# Patient Record
Sex: MALE | Race: WHITE | NOT HISPANIC OR LATINO | Employment: OTHER | URBAN - METROPOLITAN AREA
[De-identification: names, ages, dates, MRNs, and addresses within clinical notes are randomized per-mention and may not be internally consistent; named-entity substitution may affect disease eponyms.]

---

## 2017-01-10 ENCOUNTER — COMMUNICATION - HEALTHEAST (OUTPATIENT)
Dept: FAMILY MEDICINE | Facility: CLINIC | Age: 72
End: 2017-01-10

## 2017-01-10 ENCOUNTER — AMBULATORY - HEALTHEAST (OUTPATIENT)
Dept: FAMILY MEDICINE | Facility: CLINIC | Age: 72
End: 2017-01-10

## 2017-01-10 DIAGNOSIS — N40.0 BPH (BENIGN PROSTATIC HYPERPLASIA): ICD-10-CM

## 2017-01-19 ENCOUNTER — COMMUNICATION - HEALTHEAST (OUTPATIENT)
Dept: FAMILY MEDICINE | Facility: CLINIC | Age: 72
End: 2017-01-19

## 2017-01-19 DIAGNOSIS — I10 ESSENTIAL HYPERTENSION, MALIGNANT: ICD-10-CM

## 2017-02-17 ENCOUNTER — HOSPITAL ENCOUNTER (OUTPATIENT)
Dept: CT IMAGING | Facility: HOSPITAL | Age: 72
Discharge: HOME | End: 2017-02-17
Attending: UROLOGY

## 2017-02-17 ENCOUNTER — RECORDS - HEALTHEAST (OUTPATIENT)
Dept: ADMINISTRATIVE | Facility: OTHER | Age: 72
End: 2017-02-17

## 2017-02-17 DIAGNOSIS — R31.9 HEMATURIA: ICD-10-CM

## 2017-02-20 ENCOUNTER — COMMUNICATION - HEALTHEAST (OUTPATIENT)
Dept: FAMILY MEDICINE | Facility: CLINIC | Age: 72
End: 2017-02-20

## 2017-02-24 ENCOUNTER — OFFICE VISIT - HEALTHEAST (OUTPATIENT)
Dept: FAMILY MEDICINE | Facility: CLINIC | Age: 72
End: 2017-02-24

## 2017-02-24 DIAGNOSIS — Z01.818 PREOP EXAMINATION: ICD-10-CM

## 2017-02-24 LAB
ATRIAL RATE - MUSE: 52 BPM
DIASTOLIC BLOOD PRESSURE - MUSE: NORMAL MMHG
INTERPRETATION ECG - MUSE: NORMAL
P AXIS - MUSE: 43 DEGREES
PR INTERVAL - MUSE: 182 MS
QRS DURATION - MUSE: 80 MS
QT - MUSE: 460 MS
QTC - MUSE: 427 MS
R AXIS - MUSE: -2 DEGREES
SYSTOLIC BLOOD PRESSURE - MUSE: NORMAL MMHG
T AXIS - MUSE: 6 DEGREES
VENTRICULAR RATE- MUSE: 52 BPM

## 2017-02-24 RX ORDER — FOLIC ACID 1 MG/1
1 TABLET ORAL DAILY
Refills: 4 | Status: SHIPPED | COMMUNITY
Start: 2017-01-30

## 2017-02-24 ASSESSMENT — MIFFLIN-ST. JEOR: SCORE: 1872.18

## 2017-02-27 ENCOUNTER — COMMUNICATION - HEALTHEAST (OUTPATIENT)
Dept: FAMILY MEDICINE | Facility: CLINIC | Age: 72
End: 2017-02-27

## 2017-02-28 ENCOUNTER — ANESTHESIA - HEALTHEAST (OUTPATIENT)
Dept: SURGERY | Facility: HOSPITAL | Age: 72
End: 2017-02-28

## 2017-02-28 ASSESSMENT — MIFFLIN-ST. JEOR: SCORE: 1868.55

## 2017-03-01 ENCOUNTER — SURGERY - HEALTHEAST (OUTPATIENT)
Dept: SURGERY | Facility: HOSPITAL | Age: 72
End: 2017-03-01

## 2017-03-01 ASSESSMENT — MIFFLIN-ST. JEOR: SCORE: 1874.22

## 2017-03-09 ENCOUNTER — RECORDS - HEALTHEAST (OUTPATIENT)
Dept: ADMINISTRATIVE | Facility: OTHER | Age: 72
End: 2017-03-09

## 2017-04-06 ENCOUNTER — COMMUNICATION - HEALTHEAST (OUTPATIENT)
Dept: CARDIOLOGY | Facility: CLINIC | Age: 72
End: 2017-04-06

## 2017-04-06 DIAGNOSIS — E78.5 HLD (HYPERLIPIDEMIA): ICD-10-CM

## 2017-06-18 ENCOUNTER — COMMUNICATION - HEALTHEAST (OUTPATIENT)
Dept: FAMILY MEDICINE | Facility: CLINIC | Age: 72
End: 2017-06-18

## 2017-06-18 ENCOUNTER — COMMUNICATION - HEALTHEAST (OUTPATIENT)
Dept: CARDIOLOGY | Facility: CLINIC | Age: 72
End: 2017-06-18

## 2017-06-18 DIAGNOSIS — I10 UNSPECIFIED ESSENTIAL HYPERTENSION: ICD-10-CM

## 2017-06-18 DIAGNOSIS — M06.9 RHEUMATOID ARTHRITIS (H): ICD-10-CM

## 2017-06-18 DIAGNOSIS — I48.91 NEW ONSET ATRIAL FIBRILLATION (H): ICD-10-CM

## 2017-06-18 DIAGNOSIS — I10 ESSENTIAL HYPERTENSION: ICD-10-CM

## 2017-07-15 ENCOUNTER — COMMUNICATION - HEALTHEAST (OUTPATIENT)
Dept: CARDIOLOGY | Facility: CLINIC | Age: 72
End: 2017-07-15

## 2017-07-15 ENCOUNTER — COMMUNICATION - HEALTHEAST (OUTPATIENT)
Dept: FAMILY MEDICINE | Facility: CLINIC | Age: 72
End: 2017-07-15

## 2017-07-15 DIAGNOSIS — I48.91 NEW ONSET ATRIAL FIBRILLATION (H): ICD-10-CM

## 2017-07-15 DIAGNOSIS — I10 UNSPECIFIED ESSENTIAL HYPERTENSION: ICD-10-CM

## 2017-07-15 DIAGNOSIS — I10 ESSENTIAL HYPERTENSION: ICD-10-CM

## 2017-07-15 DIAGNOSIS — M06.9 RHEUMATOID ARTHRITIS (H): ICD-10-CM

## 2017-07-17 ENCOUNTER — COMMUNICATION - HEALTHEAST (OUTPATIENT)
Dept: CARDIOLOGY | Facility: CLINIC | Age: 72
End: 2017-07-17

## 2017-07-17 ENCOUNTER — AMBULATORY - HEALTHEAST (OUTPATIENT)
Dept: FAMILY MEDICINE | Facility: CLINIC | Age: 72
End: 2017-07-17

## 2017-07-17 DIAGNOSIS — E78.5 HLD (HYPERLIPIDEMIA): ICD-10-CM

## 2017-07-17 DIAGNOSIS — I10 UNSPECIFIED ESSENTIAL HYPERTENSION: ICD-10-CM

## 2017-07-17 DIAGNOSIS — E78.5 HYPERLIPIDEMIA: ICD-10-CM

## 2017-07-17 DIAGNOSIS — I48.91 NEW ONSET ATRIAL FIBRILLATION (H): ICD-10-CM

## 2017-07-17 DIAGNOSIS — I10 ESSENTIAL HYPERTENSION WITH GOAL BLOOD PRESSURE LESS THAN 140/90: ICD-10-CM

## 2017-07-28 ENCOUNTER — AMBULATORY - HEALTHEAST (OUTPATIENT)
Dept: FAMILY MEDICINE | Facility: CLINIC | Age: 72
End: 2017-07-28

## 2017-07-28 ENCOUNTER — COMMUNICATION - HEALTHEAST (OUTPATIENT)
Dept: FAMILY MEDICINE | Facility: CLINIC | Age: 72
End: 2017-07-28

## 2017-08-17 ENCOUNTER — OFFICE VISIT - HEALTHEAST (OUTPATIENT)
Dept: FAMILY MEDICINE | Facility: CLINIC | Age: 72
End: 2017-08-17

## 2017-08-17 DIAGNOSIS — L40.9 PSORIASIS: ICD-10-CM

## 2017-08-17 DIAGNOSIS — E78.5 HYPERLIPIDEMIA: ICD-10-CM

## 2017-08-17 DIAGNOSIS — E66.9 OBESITY, UNSPECIFIED: ICD-10-CM

## 2017-08-17 DIAGNOSIS — M06.9 RHEUMATOID ARTHRITIS (H): ICD-10-CM

## 2017-08-17 DIAGNOSIS — K21.9 ESOPHAGEAL REFLUX: ICD-10-CM

## 2017-08-17 DIAGNOSIS — I10 ESSENTIAL HYPERTENSION: ICD-10-CM

## 2017-08-17 DIAGNOSIS — D09.0 CIS (CARCINOMA IN SITU OF BLADDER): ICD-10-CM

## 2017-08-17 DIAGNOSIS — Z00.00 HEALTH CARE MAINTENANCE: ICD-10-CM

## 2017-08-17 LAB
CHOLEST SERPL-MCNC: 148 MG/DL
FASTING STATUS PATIENT QL REPORTED: YES
HDLC SERPL-MCNC: 23 MG/DL
LDLC SERPL CALC-MCNC: 93 MG/DL
TRIGL SERPL-MCNC: 159 MG/DL

## 2017-08-17 ASSESSMENT — MIFFLIN-ST. JEOR: SCORE: 1807.2

## 2017-08-18 ENCOUNTER — COMMUNICATION - HEALTHEAST (OUTPATIENT)
Dept: FAMILY MEDICINE | Facility: CLINIC | Age: 72
End: 2017-08-18

## 2017-08-21 ENCOUNTER — COMMUNICATION - HEALTHEAST (OUTPATIENT)
Dept: FAMILY MEDICINE | Facility: CLINIC | Age: 72
End: 2017-08-21

## 2017-08-21 DIAGNOSIS — I10 ESSENTIAL HYPERTENSION WITH GOAL BLOOD PRESSURE LESS THAN 140/90: ICD-10-CM

## 2017-08-21 DIAGNOSIS — I10 UNSPECIFIED ESSENTIAL HYPERTENSION: ICD-10-CM

## 2017-08-21 DIAGNOSIS — I48.91 NEW ONSET ATRIAL FIBRILLATION (H): ICD-10-CM

## 2017-09-01 ENCOUNTER — RECORDS - HEALTHEAST (OUTPATIENT)
Dept: ADMINISTRATIVE | Facility: OTHER | Age: 72
End: 2017-09-01

## 2017-09-18 ENCOUNTER — COMMUNICATION - HEALTHEAST (OUTPATIENT)
Dept: FAMILY MEDICINE | Facility: CLINIC | Age: 72
End: 2017-09-18

## 2017-09-18 DIAGNOSIS — I10 HTN (HYPERTENSION): ICD-10-CM

## 2017-10-19 ENCOUNTER — COMMUNICATION - HEALTHEAST (OUTPATIENT)
Dept: FAMILY MEDICINE | Facility: CLINIC | Age: 72
End: 2017-10-19

## 2017-10-19 DIAGNOSIS — E78.5 HYPERLIPIDEMIA: ICD-10-CM

## 2017-12-08 ENCOUNTER — COMMUNICATION - HEALTHEAST (OUTPATIENT)
Dept: FAMILY MEDICINE | Facility: CLINIC | Age: 72
End: 2017-12-08

## 2017-12-08 DIAGNOSIS — N40.0 BPH (BENIGN PROSTATIC HYPERPLASIA): ICD-10-CM

## 2017-12-22 ENCOUNTER — AMBULATORY - HEALTHEAST (OUTPATIENT)
Dept: NURSING | Facility: CLINIC | Age: 72
End: 2017-12-22

## 2017-12-22 DIAGNOSIS — Z23 NEED FOR VACCINATION: ICD-10-CM

## 2017-12-26 ENCOUNTER — COMMUNICATION - HEALTHEAST (OUTPATIENT)
Dept: FAMILY MEDICINE | Facility: CLINIC | Age: 72
End: 2017-12-26

## 2017-12-26 DIAGNOSIS — I10 ESSENTIAL HYPERTENSION: ICD-10-CM

## 2018-02-09 ENCOUNTER — RECORDS - HEALTHEAST (OUTPATIENT)
Dept: ADMINISTRATIVE | Facility: OTHER | Age: 73
End: 2018-02-09

## 2018-03-06 ENCOUNTER — OFFICE VISIT - HEALTHEAST (OUTPATIENT)
Dept: FAMILY MEDICINE | Facility: CLINIC | Age: 73
End: 2018-03-06

## 2018-03-06 ENCOUNTER — COMMUNICATION - HEALTHEAST (OUTPATIENT)
Dept: FAMILY MEDICINE | Facility: CLINIC | Age: 73
End: 2018-03-06

## 2018-03-06 DIAGNOSIS — R05.9 COUGH: ICD-10-CM

## 2018-03-06 DIAGNOSIS — Z12.11 COLON CANCER SCREENING: ICD-10-CM

## 2018-03-07 ENCOUNTER — COMMUNICATION - HEALTHEAST (OUTPATIENT)
Dept: FAMILY MEDICINE | Facility: CLINIC | Age: 73
End: 2018-03-07

## 2018-05-15 ENCOUNTER — COMMUNICATION - HEALTHEAST (OUTPATIENT)
Dept: FAMILY MEDICINE | Facility: CLINIC | Age: 73
End: 2018-05-15

## 2018-05-15 ENCOUNTER — AMBULATORY - HEALTHEAST (OUTPATIENT)
Dept: FAMILY MEDICINE | Facility: CLINIC | Age: 73
End: 2018-05-15

## 2018-05-15 ENCOUNTER — OFFICE VISIT - HEALTHEAST (OUTPATIENT)
Dept: FAMILY MEDICINE | Facility: CLINIC | Age: 73
End: 2018-05-15

## 2018-05-15 DIAGNOSIS — L04.9 LYMPHADENITIS, ACUTE: ICD-10-CM

## 2018-05-15 DIAGNOSIS — R05.9 COUGH: ICD-10-CM

## 2018-05-15 DIAGNOSIS — M06.9 RHEUMATOID ARTHRITIS (H): ICD-10-CM

## 2018-07-02 ENCOUNTER — RECORDS - HEALTHEAST (OUTPATIENT)
Dept: ADMINISTRATIVE | Facility: OTHER | Age: 73
End: 2018-07-02

## 2018-08-21 ENCOUNTER — COMMUNICATION - HEALTHEAST (OUTPATIENT)
Dept: FAMILY MEDICINE | Facility: CLINIC | Age: 73
End: 2018-08-21

## 2018-08-21 DIAGNOSIS — E78.5 HYPERLIPIDEMIA: ICD-10-CM

## 2018-09-18 ENCOUNTER — COMMUNICATION - HEALTHEAST (OUTPATIENT)
Dept: FAMILY MEDICINE | Facility: CLINIC | Age: 73
End: 2018-09-18

## 2018-09-18 DIAGNOSIS — I10 ESSENTIAL HYPERTENSION WITH GOAL BLOOD PRESSURE LESS THAN 140/90: ICD-10-CM

## 2018-09-18 DIAGNOSIS — I10 ESSENTIAL HYPERTENSION: ICD-10-CM

## 2018-09-18 DIAGNOSIS — I48.91 NEW ONSET ATRIAL FIBRILLATION (H): ICD-10-CM

## 2018-10-10 ENCOUNTER — COMMUNICATION - HEALTHEAST (OUTPATIENT)
Dept: FAMILY MEDICINE | Facility: CLINIC | Age: 73
End: 2018-10-10

## 2018-10-10 DIAGNOSIS — I10 HTN (HYPERTENSION): ICD-10-CM

## 2018-10-10 DIAGNOSIS — N40.0 BPH (BENIGN PROSTATIC HYPERPLASIA): ICD-10-CM

## 2018-10-22 ENCOUNTER — COMMUNICATION - HEALTHEAST (OUTPATIENT)
Dept: FAMILY MEDICINE | Facility: CLINIC | Age: 73
End: 2018-10-22

## 2018-10-22 DIAGNOSIS — I10 ESSENTIAL HYPERTENSION: ICD-10-CM

## 2018-11-26 ENCOUNTER — RECORDS - HEALTHEAST (OUTPATIENT)
Dept: ADMINISTRATIVE | Facility: OTHER | Age: 73
End: 2018-11-26

## 2018-11-28 ENCOUNTER — OFFICE VISIT - HEALTHEAST (OUTPATIENT)
Dept: FAMILY MEDICINE | Facility: CLINIC | Age: 73
End: 2018-11-28

## 2018-11-28 DIAGNOSIS — R73.01 ELEVATED FASTING BLOOD SUGAR: ICD-10-CM

## 2018-11-28 DIAGNOSIS — E78.5 HYPERLIPIDEMIA, UNSPECIFIED HYPERLIPIDEMIA TYPE: ICD-10-CM

## 2018-11-28 DIAGNOSIS — E66.812 CLASS 2 OBESITY WITHOUT SERIOUS COMORBIDITY WITH BODY MASS INDEX (BMI) OF 36.0 TO 36.9 IN ADULT, UNSPECIFIED OBESITY TYPE: ICD-10-CM

## 2018-11-28 DIAGNOSIS — Z00.00 HEALTH CARE MAINTENANCE: ICD-10-CM

## 2018-11-28 DIAGNOSIS — I10 ESSENTIAL HYPERTENSION: ICD-10-CM

## 2018-11-28 LAB
ALBUMIN SERPL-MCNC: 3.8 G/DL (ref 3.5–5)
ALP SERPL-CCNC: 86 U/L (ref 45–120)
ALT SERPL W P-5'-P-CCNC: 48 U/L (ref 0–45)
ANION GAP SERPL CALCULATED.3IONS-SCNC: 8 MMOL/L (ref 5–18)
AST SERPL W P-5'-P-CCNC: 32 U/L (ref 0–40)
BILIRUB SERPL-MCNC: 0.6 MG/DL (ref 0–1)
BUN SERPL-MCNC: 13 MG/DL (ref 8–28)
CALCIUM SERPL-MCNC: 9.2 MG/DL (ref 8.5–10.5)
CHLORIDE BLD-SCNC: 101 MMOL/L (ref 98–107)
CHOLEST SERPL-MCNC: 146 MG/DL
CO2 SERPL-SCNC: 26 MMOL/L (ref 22–31)
CREAT SERPL-MCNC: 0.89 MG/DL (ref 0.7–1.3)
FASTING STATUS PATIENT QL REPORTED: YES
GFR SERPL CREATININE-BSD FRML MDRD: >60 ML/MIN/1.73M2
GLUCOSE BLD-MCNC: 109 MG/DL (ref 70–125)
HBA1C MFR BLD: 6.4 % (ref 3.5–6)
HDLC SERPL-MCNC: 30 MG/DL
LDLC SERPL CALC-MCNC: 82 MG/DL
POTASSIUM BLD-SCNC: 4.3 MMOL/L (ref 3.5–5)
PROT SERPL-MCNC: 7.1 G/DL (ref 6–8)
SODIUM SERPL-SCNC: 135 MMOL/L (ref 136–145)
TRIGL SERPL-MCNC: 170 MG/DL

## 2018-11-28 ASSESSMENT — MIFFLIN-ST. JEOR: SCORE: 1852.56

## 2018-11-29 ENCOUNTER — COMMUNICATION - HEALTHEAST (OUTPATIENT)
Dept: FAMILY MEDICINE | Facility: CLINIC | Age: 73
End: 2018-11-29

## 2018-12-05 ENCOUNTER — RECORDS - HEALTHEAST (OUTPATIENT)
Dept: ADMINISTRATIVE | Facility: OTHER | Age: 73
End: 2018-12-05

## 2018-12-05 ENCOUNTER — AMBULATORY - HEALTHEAST (OUTPATIENT)
Dept: NURSING | Facility: CLINIC | Age: 73
End: 2018-12-05

## 2018-12-11 ENCOUNTER — COMMUNICATION - HEALTHEAST (OUTPATIENT)
Dept: FAMILY MEDICINE | Facility: CLINIC | Age: 73
End: 2018-12-11

## 2018-12-11 DIAGNOSIS — I10 ESSENTIAL HYPERTENSION: ICD-10-CM

## 2018-12-11 DIAGNOSIS — I10 ESSENTIAL HYPERTENSION WITH GOAL BLOOD PRESSURE LESS THAN 140/90: ICD-10-CM

## 2018-12-11 DIAGNOSIS — I48.91 NEW ONSET ATRIAL FIBRILLATION (H): ICD-10-CM

## 2018-12-19 ENCOUNTER — OFFICE VISIT - HEALTHEAST (OUTPATIENT)
Dept: FAMILY MEDICINE | Facility: CLINIC | Age: 73
End: 2018-12-19

## 2018-12-19 DIAGNOSIS — I10 ESSENTIAL HYPERTENSION: ICD-10-CM

## 2018-12-24 ENCOUNTER — COMMUNICATION - HEALTHEAST (OUTPATIENT)
Dept: FAMILY MEDICINE | Facility: CLINIC | Age: 73
End: 2018-12-24

## 2018-12-24 ENCOUNTER — AMBULATORY - HEALTHEAST (OUTPATIENT)
Dept: FAMILY MEDICINE | Facility: CLINIC | Age: 73
End: 2018-12-24

## 2018-12-24 DIAGNOSIS — R05.9 COUGH: ICD-10-CM

## 2019-01-11 ENCOUNTER — COMMUNICATION - HEALTHEAST (OUTPATIENT)
Dept: FAMILY MEDICINE | Facility: CLINIC | Age: 74
End: 2019-01-11

## 2019-01-11 DIAGNOSIS — M06.9 RHEUMATOID ARTHRITIS (H): ICD-10-CM

## 2019-01-23 ENCOUNTER — COMMUNICATION - HEALTHEAST (OUTPATIENT)
Dept: FAMILY MEDICINE | Facility: CLINIC | Age: 74
End: 2019-01-23

## 2019-01-23 DIAGNOSIS — I10 HTN (HYPERTENSION): ICD-10-CM

## 2019-02-01 ENCOUNTER — COMMUNICATION - HEALTHEAST (OUTPATIENT)
Dept: FAMILY MEDICINE | Facility: CLINIC | Age: 74
End: 2019-02-01

## 2019-02-01 DIAGNOSIS — N40.0 BPH (BENIGN PROSTATIC HYPERPLASIA): ICD-10-CM

## 2019-02-04 ENCOUNTER — RECORDS - HEALTHEAST (OUTPATIENT)
Dept: ADMINISTRATIVE | Facility: OTHER | Age: 74
End: 2019-02-04

## 2019-02-07 ASSESSMENT — MIFFLIN-ST. JEOR
SCORE: 1871.6
SCORE: 1871.6

## 2019-02-08 ENCOUNTER — SURGERY - HEALTHEAST (OUTPATIENT)
Dept: CARDIOLOGY | Facility: CLINIC | Age: 74
End: 2019-02-08

## 2019-02-08 ASSESSMENT — MIFFLIN-ST. JEOR: SCORE: 1852.1

## 2019-02-09 ASSESSMENT — MIFFLIN-ST. JEOR: SCORE: 1833.05

## 2019-02-11 ENCOUNTER — COMMUNICATION - HEALTHEAST (OUTPATIENT)
Dept: CARE COORDINATION | Facility: CLINIC | Age: 74
End: 2019-02-11

## 2019-02-12 ENCOUNTER — OFFICE VISIT - HEALTHEAST (OUTPATIENT)
Dept: FAMILY MEDICINE | Facility: CLINIC | Age: 74
End: 2019-02-12

## 2019-02-12 DIAGNOSIS — I71.40 ABDOMINAL AORTIC ANEURYSM (AAA) WITHOUT RUPTURE (H): ICD-10-CM

## 2019-02-12 DIAGNOSIS — E11.9 TYPE 2 DIABETES MELLITUS TREATED WITHOUT INSULIN (H): ICD-10-CM

## 2019-02-12 DIAGNOSIS — I25.10 CORONARY ARTERY DISEASE DUE TO LIPID RICH PLAQUE: ICD-10-CM

## 2019-02-12 DIAGNOSIS — I10 ESSENTIAL HYPERTENSION: ICD-10-CM

## 2019-02-12 DIAGNOSIS — I25.83 CORONARY ARTERY DISEASE DUE TO LIPID RICH PLAQUE: ICD-10-CM

## 2019-02-12 LAB
ANION GAP SERPL CALCULATED.3IONS-SCNC: 11 MMOL/L (ref 5–18)
BUN SERPL-MCNC: 22 MG/DL (ref 8–28)
CALCIUM SERPL-MCNC: 9.8 MG/DL (ref 8.5–10.5)
CHLORIDE BLD-SCNC: 104 MMOL/L (ref 98–107)
CO2 SERPL-SCNC: 22 MMOL/L (ref 22–31)
CREAT SERPL-MCNC: 1.02 MG/DL (ref 0.7–1.3)
GFR SERPL CREATININE-BSD FRML MDRD: >60 ML/MIN/1.73M2
GLUCOSE BLD-MCNC: 107 MG/DL (ref 70–125)
POTASSIUM BLD-SCNC: 4 MMOL/L (ref 3.5–5)
SODIUM SERPL-SCNC: 137 MMOL/L (ref 136–145)

## 2019-02-13 ENCOUNTER — COMMUNICATION - HEALTHEAST (OUTPATIENT)
Dept: PHARMACY | Facility: CLINIC | Age: 74
End: 2019-02-13

## 2019-02-13 ENCOUNTER — COMMUNICATION - HEALTHEAST (OUTPATIENT)
Dept: FAMILY MEDICINE | Facility: CLINIC | Age: 74
End: 2019-02-13

## 2019-02-13 DIAGNOSIS — I25.83 CORONARY ARTERY DISEASE DUE TO LIPID RICH PLAQUE: ICD-10-CM

## 2019-02-13 DIAGNOSIS — E11.9 TYPE 2 DIABETES MELLITUS TREATED WITHOUT INSULIN (H): ICD-10-CM

## 2019-02-13 DIAGNOSIS — I25.10 CORONARY ARTERY DISEASE DUE TO LIPID RICH PLAQUE: ICD-10-CM

## 2019-02-13 DIAGNOSIS — M06.9 RHEUMATOID ARTHRITIS, INVOLVING UNSPECIFIED SITE, UNSPECIFIED RHEUMATOID FACTOR PRESENCE: ICD-10-CM

## 2019-02-13 DIAGNOSIS — I48.0 PAROXYSMAL ATRIAL FIBRILLATION (H): ICD-10-CM

## 2019-02-14 ENCOUNTER — COMMUNICATION - HEALTHEAST (OUTPATIENT)
Dept: VASCULAR SURGERY | Facility: CLINIC | Age: 74
End: 2019-02-14

## 2019-02-14 ENCOUNTER — AMBULATORY - HEALTHEAST (OUTPATIENT)
Dept: CARDIAC REHAB | Facility: CLINIC | Age: 74
End: 2019-02-14

## 2019-02-14 ENCOUNTER — COMMUNICATION - HEALTHEAST (OUTPATIENT)
Dept: CARDIOLOGY | Facility: CLINIC | Age: 74
End: 2019-02-14

## 2019-02-14 DIAGNOSIS — Z95.5 S/P DRUG ELUTING CORONARY STENT PLACEMENT: ICD-10-CM

## 2019-02-14 DIAGNOSIS — R07.9 ACUTE CHEST PAIN: ICD-10-CM

## 2019-02-14 DIAGNOSIS — R07.1 CHEST PAIN MADE WORSE BY BREATHING: ICD-10-CM

## 2019-02-14 RX ORDER — NITROGLYCERIN 0.4 MG/1
0.4 TABLET SUBLINGUAL EVERY 5 MIN PRN
Qty: 20 TABLET | Refills: 1 | Status: ON HOLD | OUTPATIENT
Start: 2019-02-14 | End: 2022-01-04

## 2019-02-15 ENCOUNTER — AMBULATORY - HEALTHEAST (OUTPATIENT)
Dept: FAMILY MEDICINE | Facility: CLINIC | Age: 74
End: 2019-02-15

## 2019-02-15 ENCOUNTER — COMMUNICATION - HEALTHEAST (OUTPATIENT)
Dept: VASCULAR SURGERY | Facility: CLINIC | Age: 74
End: 2019-02-15

## 2019-02-15 DIAGNOSIS — I71.40 ABDOMINAL AORTIC ANEURYSM (AAA) WITHOUT RUPTURE (H): ICD-10-CM

## 2019-02-22 ENCOUNTER — COMMUNICATION - HEALTHEAST (OUTPATIENT)
Dept: CARDIOLOGY | Facility: CLINIC | Age: 74
End: 2019-02-22

## 2019-02-22 ENCOUNTER — AMBULATORY - HEALTHEAST (OUTPATIENT)
Dept: PHARMACY | Facility: CLINIC | Age: 74
End: 2019-02-22

## 2019-02-22 ENCOUNTER — OFFICE VISIT - HEALTHEAST (OUTPATIENT)
Dept: CARDIOLOGY | Facility: CLINIC | Age: 74
End: 2019-02-22

## 2019-02-22 DIAGNOSIS — E11.9 TYPE 2 DIABETES MELLITUS TREATED WITHOUT INSULIN (H): ICD-10-CM

## 2019-02-22 DIAGNOSIS — I25.10 CORONARY ARTERY DISEASE DUE TO LIPID RICH PLAQUE: ICD-10-CM

## 2019-02-22 DIAGNOSIS — I10 ESSENTIAL HYPERTENSION: ICD-10-CM

## 2019-02-22 DIAGNOSIS — I25.83 CORONARY ARTERY DISEASE DUE TO LIPID RICH PLAQUE: ICD-10-CM

## 2019-02-22 DIAGNOSIS — R07.9 ACUTE CHEST PAIN: ICD-10-CM

## 2019-02-22 DIAGNOSIS — R94.39 ABNORMAL CARDIOVASCULAR STRESS TEST: ICD-10-CM

## 2019-02-22 DIAGNOSIS — E78.00 PURE HYPERCHOLESTEROLEMIA: ICD-10-CM

## 2019-02-22 DIAGNOSIS — I71.40 ABDOMINAL AORTIC ANEURYSM (AAA) WITHOUT RUPTURE (H): ICD-10-CM

## 2019-02-22 DIAGNOSIS — G47.33 OSA (OBSTRUCTIVE SLEEP APNEA): ICD-10-CM

## 2019-02-22 DIAGNOSIS — R79.89 TROPONIN LEVEL ELEVATED: ICD-10-CM

## 2019-02-22 ASSESSMENT — MIFFLIN-ST. JEOR
SCORE: 1847.56
SCORE: 1847.56

## 2019-02-25 ENCOUNTER — COMMUNICATION - HEALTHEAST (OUTPATIENT)
Dept: CARDIOLOGY | Facility: CLINIC | Age: 74
End: 2019-02-25

## 2019-02-28 ENCOUNTER — COMMUNICATION - HEALTHEAST (OUTPATIENT)
Dept: FAMILY MEDICINE | Facility: CLINIC | Age: 74
End: 2019-02-28

## 2019-02-28 DIAGNOSIS — E11.9 TYPE 2 DIABETES MELLITUS TREATED WITHOUT INSULIN (H): ICD-10-CM

## 2019-02-28 RX ORDER — GLUCOSAMINE HCL/CHONDROITIN SU 500-400 MG
1 CAPSULE ORAL 2 TIMES DAILY
Qty: 180 STRIP | Refills: 1 | Status: SHIPPED | OUTPATIENT
Start: 2019-02-28

## 2019-03-06 ENCOUNTER — OFFICE VISIT - HEALTHEAST (OUTPATIENT)
Dept: EDUCATION SERVICES | Facility: CLINIC | Age: 74
End: 2019-03-06

## 2019-03-06 ENCOUNTER — COMMUNICATION - HEALTHEAST (OUTPATIENT)
Dept: FAMILY MEDICINE | Facility: CLINIC | Age: 74
End: 2019-03-06

## 2019-03-06 DIAGNOSIS — E11.9 TYPE 2 DIABETES MELLITUS TREATED WITHOUT INSULIN (H): ICD-10-CM

## 2019-03-06 DIAGNOSIS — E11.9 DIABETES MELLITUS, TYPE 2 (H): ICD-10-CM

## 2019-03-08 ENCOUNTER — AMBULATORY - HEALTHEAST (OUTPATIENT)
Dept: VASCULAR SURGERY | Facility: CLINIC | Age: 74
End: 2019-03-08

## 2019-03-08 ENCOUNTER — AMBULATORY - HEALTHEAST (OUTPATIENT)
Dept: LAB | Facility: CLINIC | Age: 74
End: 2019-03-08

## 2019-03-08 ENCOUNTER — COMMUNICATION - HEALTHEAST (OUTPATIENT)
Dept: CARDIOLOGY | Facility: CLINIC | Age: 74
End: 2019-03-08

## 2019-03-08 DIAGNOSIS — I25.83 CORONARY ARTERY DISEASE DUE TO LIPID RICH PLAQUE: ICD-10-CM

## 2019-03-08 DIAGNOSIS — I25.10 CORONARY ARTERY DISEASE DUE TO LIPID RICH PLAQUE: ICD-10-CM

## 2019-03-08 DIAGNOSIS — I10 ESSENTIAL HYPERTENSION: ICD-10-CM

## 2019-03-08 LAB
ANION GAP SERPL CALCULATED.3IONS-SCNC: 11 MMOL/L (ref 5–18)
BUN SERPL-MCNC: 18 MG/DL (ref 8–28)
CALCIUM SERPL-MCNC: 10 MG/DL (ref 8.5–10.5)
CHLORIDE BLD-SCNC: 102 MMOL/L (ref 98–107)
CO2 SERPL-SCNC: 25 MMOL/L (ref 22–31)
CREAT SERPL-MCNC: 0.91 MG/DL (ref 0.7–1.3)
GFR SERPL CREATININE-BSD FRML MDRD: >60 ML/MIN/1.73M2
GLUCOSE BLD-MCNC: 86 MG/DL (ref 70–125)
POTASSIUM BLD-SCNC: 3.6 MMOL/L (ref 3.5–5)
SODIUM SERPL-SCNC: 138 MMOL/L (ref 136–145)

## 2019-03-12 ENCOUNTER — COMMUNICATION - HEALTHEAST (OUTPATIENT)
Dept: FAMILY MEDICINE | Facility: CLINIC | Age: 74
End: 2019-03-12

## 2019-03-13 ENCOUNTER — OFFICE VISIT - HEALTHEAST (OUTPATIENT)
Dept: FAMILY MEDICINE | Facility: CLINIC | Age: 74
End: 2019-03-13

## 2019-03-13 ENCOUNTER — COMMUNICATION - HEALTHEAST (OUTPATIENT)
Dept: FAMILY MEDICINE | Facility: CLINIC | Age: 74
End: 2019-03-13

## 2019-03-13 DIAGNOSIS — E11.9 TYPE 2 DIABETES MELLITUS TREATED WITHOUT INSULIN (H): ICD-10-CM

## 2019-03-13 DIAGNOSIS — I10 ESSENTIAL HYPERTENSION: ICD-10-CM

## 2019-03-13 LAB
CREAT UR-MCNC: 97.2 MG/DL
MICROALBUMIN UR-MCNC: 1.27 MG/DL (ref 0–1.99)
MICROALBUMIN/CREAT UR: 13.1 MG/G

## 2019-03-13 ASSESSMENT — MIFFLIN-ST. JEOR: SCORE: 1815.81

## 2019-03-22 ENCOUNTER — RECORDS - HEALTHEAST (OUTPATIENT)
Dept: ADMINISTRATIVE | Facility: OTHER | Age: 74
End: 2019-03-22

## 2019-04-01 ENCOUNTER — OFFICE VISIT - HEALTHEAST (OUTPATIENT)
Dept: CARDIOLOGY | Facility: CLINIC | Age: 74
End: 2019-04-01

## 2019-04-01 DIAGNOSIS — E11.9 TYPE 2 DIABETES MELLITUS TREATED WITHOUT INSULIN (H): ICD-10-CM

## 2019-04-01 DIAGNOSIS — E78.00 PURE HYPERCHOLESTEROLEMIA: ICD-10-CM

## 2019-04-01 DIAGNOSIS — I25.10 CORONARY ARTERY DISEASE DUE TO LIPID RICH PLAQUE: ICD-10-CM

## 2019-04-01 DIAGNOSIS — I10 ESSENTIAL HYPERTENSION: ICD-10-CM

## 2019-04-01 DIAGNOSIS — I25.83 CORONARY ARTERY DISEASE DUE TO LIPID RICH PLAQUE: ICD-10-CM

## 2019-04-01 DIAGNOSIS — I71.40 ABDOMINAL AORTIC ANEURYSM (AAA) WITHOUT RUPTURE (H): ICD-10-CM

## 2019-04-01 ASSESSMENT — MIFFLIN-ST. JEOR: SCORE: 1830.78

## 2019-04-02 ENCOUNTER — COMMUNICATION - HEALTHEAST (OUTPATIENT)
Dept: CARDIOLOGY | Facility: CLINIC | Age: 74
End: 2019-04-02

## 2019-04-16 ENCOUNTER — COMMUNICATION - HEALTHEAST (OUTPATIENT)
Dept: FAMILY MEDICINE | Facility: CLINIC | Age: 74
End: 2019-04-16

## 2019-04-29 ENCOUNTER — AMBULATORY - HEALTHEAST (OUTPATIENT)
Dept: SLEEP MEDICINE | Facility: CLINIC | Age: 74
End: 2019-04-29

## 2019-04-29 ENCOUNTER — OFFICE VISIT - HEALTHEAST (OUTPATIENT)
Dept: SLEEP MEDICINE | Facility: CLINIC | Age: 74
End: 2019-04-29

## 2019-04-29 DIAGNOSIS — I48.91 ATRIAL FIBRILLATION, UNSPECIFIED TYPE (H): ICD-10-CM

## 2019-04-29 DIAGNOSIS — G47.33 OSA (OBSTRUCTIVE SLEEP APNEA): ICD-10-CM

## 2019-04-29 DIAGNOSIS — E66.811 CLASS 1 OBESITY WITH SERIOUS COMORBIDITY AND BODY MASS INDEX (BMI) OF 34.0 TO 34.9 IN ADULT, UNSPECIFIED OBESITY TYPE: ICD-10-CM

## 2019-04-29 DIAGNOSIS — I25.10 CORONARY ARTERY DISEASE DUE TO LIPID RICH PLAQUE: ICD-10-CM

## 2019-04-29 DIAGNOSIS — I25.83 CORONARY ARTERY DISEASE DUE TO LIPID RICH PLAQUE: ICD-10-CM

## 2019-04-29 ASSESSMENT — MIFFLIN-ST. JEOR: SCORE: 1838.49

## 2019-05-13 ENCOUNTER — COMMUNICATION - HEALTHEAST (OUTPATIENT)
Dept: CARDIOLOGY | Facility: CLINIC | Age: 74
End: 2019-05-13

## 2019-05-13 DIAGNOSIS — E78.00 PURE HYPERCHOLESTEROLEMIA: ICD-10-CM

## 2019-06-16 ENCOUNTER — COMMUNICATION - HEALTHEAST (OUTPATIENT)
Dept: FAMILY MEDICINE | Facility: CLINIC | Age: 74
End: 2019-06-16

## 2019-06-16 DIAGNOSIS — I10 ESSENTIAL HYPERTENSION: ICD-10-CM

## 2019-06-17 RX ORDER — METOPROLOL SUCCINATE 100 MG/1
TABLET, EXTENDED RELEASE ORAL
Qty: 90 TABLET | Refills: 2 | Status: SHIPPED | OUTPATIENT
Start: 2019-06-17 | End: 2024-02-05

## 2019-07-08 ENCOUNTER — COMMUNICATION - HEALTHEAST (OUTPATIENT)
Dept: FAMILY MEDICINE | Facility: CLINIC | Age: 74
End: 2019-07-08

## 2019-07-08 DIAGNOSIS — E11.9 TYPE 2 DIABETES MELLITUS TREATED WITHOUT INSULIN (H): ICD-10-CM

## 2019-07-22 ENCOUNTER — COMMUNICATION - HEALTHEAST (OUTPATIENT)
Dept: SLEEP MEDICINE | Facility: CLINIC | Age: 74
End: 2019-07-22

## 2019-09-09 ENCOUNTER — COMMUNICATION - HEALTHEAST (OUTPATIENT)
Dept: FAMILY MEDICINE | Facility: CLINIC | Age: 74
End: 2019-09-09

## 2019-09-09 DIAGNOSIS — E11.9 TYPE 2 DIABETES MELLITUS TREATED WITHOUT INSULIN (H): ICD-10-CM

## 2019-11-16 ENCOUNTER — COMMUNICATION - HEALTHEAST (OUTPATIENT)
Dept: CARDIOLOGY | Facility: CLINIC | Age: 74
End: 2019-11-16

## 2019-11-16 DIAGNOSIS — E78.00 PURE HYPERCHOLESTEROLEMIA: ICD-10-CM

## 2019-12-18 ENCOUNTER — RECORDS - HEALTHEAST (OUTPATIENT)
Dept: ADMINISTRATIVE | Facility: OTHER | Age: 74
End: 2019-12-18

## 2019-12-21 ENCOUNTER — RECORDS - HEALTHEAST (OUTPATIENT)
Dept: ADMINISTRATIVE | Facility: OTHER | Age: 74
End: 2019-12-21

## 2019-12-22 ENCOUNTER — COMMUNICATION - HEALTHEAST (OUTPATIENT)
Dept: FAMILY MEDICINE | Facility: CLINIC | Age: 74
End: 2019-12-22

## 2019-12-22 DIAGNOSIS — I10 ESSENTIAL HYPERTENSION: ICD-10-CM

## 2019-12-22 DIAGNOSIS — I48.91 NEW ONSET ATRIAL FIBRILLATION (H): ICD-10-CM

## 2019-12-22 DIAGNOSIS — I10 ESSENTIAL HYPERTENSION WITH GOAL BLOOD PRESSURE LESS THAN 140/90: ICD-10-CM

## 2019-12-27 ENCOUNTER — COMMUNICATION - HEALTHEAST (OUTPATIENT)
Dept: FAMILY MEDICINE | Facility: CLINIC | Age: 74
End: 2019-12-27

## 2019-12-27 DIAGNOSIS — N40.0 BPH (BENIGN PROSTATIC HYPERPLASIA): ICD-10-CM

## 2019-12-28 RX ORDER — TAMSULOSIN HYDROCHLORIDE 0.4 MG/1
0.4 CAPSULE ORAL 2 TIMES DAILY
Qty: 180 CAPSULE | Refills: 0 | Status: SHIPPED | OUTPATIENT
Start: 2019-12-28

## 2020-02-03 ENCOUNTER — RECORDS - HEALTHEAST (OUTPATIENT)
Dept: ADMINISTRATIVE | Facility: OTHER | Age: 75
End: 2020-02-03

## 2020-02-19 ENCOUNTER — COMMUNICATION - HEALTHEAST (OUTPATIENT)
Dept: CARDIOLOGY | Facility: CLINIC | Age: 75
End: 2020-02-19

## 2020-02-19 DIAGNOSIS — E78.00 PURE HYPERCHOLESTEROLEMIA: ICD-10-CM

## 2020-02-19 DIAGNOSIS — I71.40 ABDOMINAL AORTIC ANEURYSM (AAA) WITHOUT RUPTURE (H): ICD-10-CM

## 2020-02-19 DIAGNOSIS — I10 ESSENTIAL HYPERTENSION: ICD-10-CM

## 2020-02-21 ENCOUNTER — AMBULATORY - HEALTHEAST (OUTPATIENT)
Dept: CARDIOLOGY | Facility: CLINIC | Age: 75
End: 2020-02-21

## 2020-02-21 DIAGNOSIS — E78.00 PURE HYPERCHOLESTEROLEMIA: ICD-10-CM

## 2020-02-25 ENCOUNTER — COMMUNICATION - HEALTHEAST (OUTPATIENT)
Dept: CARDIOLOGY | Facility: CLINIC | Age: 75
End: 2020-02-25

## 2020-02-25 DIAGNOSIS — I10 ESSENTIAL HYPERTENSION: ICD-10-CM

## 2020-02-28 ENCOUNTER — AMBULATORY - HEALTHEAST (OUTPATIENT)
Dept: CARDIOLOGY | Facility: CLINIC | Age: 75
End: 2020-02-28

## 2020-02-28 ENCOUNTER — COMMUNICATION - HEALTHEAST (OUTPATIENT)
Dept: CARDIOLOGY | Facility: CLINIC | Age: 75
End: 2020-02-28

## 2020-02-28 DIAGNOSIS — E78.00 PURE HYPERCHOLESTEROLEMIA: ICD-10-CM

## 2020-02-28 LAB
CHOLEST SERPL-MCNC: 132 MG/DL
FASTING STATUS PATIENT QL REPORTED: YES
HDLC SERPL-MCNC: 33 MG/DL
LDLC SERPL CALC-MCNC: 65 MG/DL
TRIGL SERPL-MCNC: 172 MG/DL

## 2020-03-04 ENCOUNTER — OFFICE VISIT - HEALTHEAST (OUTPATIENT)
Dept: CARDIOLOGY | Facility: CLINIC | Age: 75
End: 2020-03-04

## 2020-03-04 DIAGNOSIS — E66.9 OBESITY, UNSPECIFIED: ICD-10-CM

## 2020-03-04 DIAGNOSIS — I48.91 ATRIAL FIBRILLATION, UNSPECIFIED TYPE (H): ICD-10-CM

## 2020-03-04 DIAGNOSIS — G47.33 OSA (OBSTRUCTIVE SLEEP APNEA): ICD-10-CM

## 2020-03-04 DIAGNOSIS — E78.00 PURE HYPERCHOLESTEROLEMIA: ICD-10-CM

## 2020-03-04 DIAGNOSIS — I71.40 ABDOMINAL AORTIC ANEURYSM (AAA) WITHOUT RUPTURE (H): ICD-10-CM

## 2020-03-04 DIAGNOSIS — E11.9 TYPE 2 DIABETES MELLITUS TREATED WITHOUT INSULIN (H): ICD-10-CM

## 2020-03-04 DIAGNOSIS — I10 ESSENTIAL HYPERTENSION: ICD-10-CM

## 2020-03-04 DIAGNOSIS — I25.83 CORONARY ARTERY DISEASE DUE TO LIPID RICH PLAQUE: ICD-10-CM

## 2020-03-04 DIAGNOSIS — I25.10 CORONARY ARTERY DISEASE DUE TO LIPID RICH PLAQUE: ICD-10-CM

## 2020-03-04 ASSESSMENT — MIFFLIN-ST. JEOR: SCORE: 1793.13

## 2020-03-05 ENCOUNTER — AMBULATORY - HEALTHEAST (OUTPATIENT)
Dept: CARDIOLOGY | Facility: CLINIC | Age: 75
End: 2020-03-05

## 2020-03-05 DIAGNOSIS — I25.83 CORONARY ARTERY DISEASE DUE TO LIPID RICH PLAQUE: ICD-10-CM

## 2020-03-05 DIAGNOSIS — I25.10 CORONARY ARTERY DISEASE DUE TO LIPID RICH PLAQUE: ICD-10-CM

## 2020-03-19 ENCOUNTER — COMMUNICATION - HEALTHEAST (OUTPATIENT)
Dept: CARDIOLOGY | Facility: CLINIC | Age: 75
End: 2020-03-19

## 2020-03-19 DIAGNOSIS — I25.10 CORONARY ARTERY DISEASE DUE TO LIPID RICH PLAQUE: ICD-10-CM

## 2020-03-19 DIAGNOSIS — I25.83 CORONARY ARTERY DISEASE DUE TO LIPID RICH PLAQUE: ICD-10-CM

## 2020-03-23 ENCOUNTER — COMMUNICATION - HEALTHEAST (OUTPATIENT)
Dept: FAMILY MEDICINE | Facility: CLINIC | Age: 75
End: 2020-03-23

## 2020-03-23 DIAGNOSIS — N40.0 BPH (BENIGN PROSTATIC HYPERPLASIA): ICD-10-CM

## 2020-04-13 ENCOUNTER — COMMUNICATION - HEALTHEAST (OUTPATIENT)
Dept: FAMILY MEDICINE | Facility: CLINIC | Age: 75
End: 2020-04-13

## 2020-04-13 DIAGNOSIS — M06.9 RHEUMATOID ARTHRITIS (H): ICD-10-CM

## 2020-05-26 ENCOUNTER — COMMUNICATION - HEALTHEAST (OUTPATIENT)
Dept: CARDIOLOGY | Facility: CLINIC | Age: 75
End: 2020-05-26

## 2020-05-26 DIAGNOSIS — I71.40 ABDOMINAL AORTIC ANEURYSM (AAA) WITHOUT RUPTURE (H): ICD-10-CM

## 2020-05-26 DIAGNOSIS — I10 ESSENTIAL HYPERTENSION: ICD-10-CM

## 2020-05-28 ENCOUNTER — COMMUNICATION - HEALTHEAST (OUTPATIENT)
Dept: CARDIOLOGY | Facility: CLINIC | Age: 75
End: 2020-05-28

## 2020-05-28 DIAGNOSIS — E78.00 PURE HYPERCHOLESTEROLEMIA: ICD-10-CM

## 2020-06-05 ENCOUNTER — COMMUNICATION - HEALTHEAST (OUTPATIENT)
Dept: CARDIOLOGY | Facility: CLINIC | Age: 75
End: 2020-06-05

## 2020-06-05 DIAGNOSIS — E78.00 PURE HYPERCHOLESTEROLEMIA: ICD-10-CM

## 2020-06-15 ENCOUNTER — COMMUNICATION - HEALTHEAST (OUTPATIENT)
Dept: CARDIOLOGY | Facility: CLINIC | Age: 75
End: 2020-06-15

## 2020-06-15 DIAGNOSIS — I25.10 CAD (CORONARY ARTERY DISEASE): ICD-10-CM

## 2020-07-28 ENCOUNTER — COMMUNICATION - HEALTHEAST (OUTPATIENT)
Dept: FAMILY MEDICINE | Facility: CLINIC | Age: 75
End: 2020-07-28

## 2020-07-28 DIAGNOSIS — I10 ESSENTIAL HYPERTENSION WITH GOAL BLOOD PRESSURE LESS THAN 140/90: ICD-10-CM

## 2020-07-28 DIAGNOSIS — I48.91 NEW ONSET ATRIAL FIBRILLATION (H): ICD-10-CM

## 2020-07-28 DIAGNOSIS — I10 ESSENTIAL HYPERTENSION: ICD-10-CM

## 2020-07-30 RX ORDER — DILTIAZEM HYDROCHLORIDE 120 MG/1
CAPSULE, COATED, EXTENDED RELEASE ORAL
Qty: 90 CAPSULE | Refills: 1 | Status: SHIPPED | OUTPATIENT
Start: 2020-07-30 | End: 2021-09-13

## 2020-09-13 ENCOUNTER — COMMUNICATION - HEALTHEAST (OUTPATIENT)
Dept: FAMILY MEDICINE | Facility: CLINIC | Age: 75
End: 2020-09-13

## 2020-09-13 DIAGNOSIS — E11.9 TYPE 2 DIABETES MELLITUS TREATED WITHOUT INSULIN (H): ICD-10-CM

## 2020-09-14 ENCOUNTER — AMBULATORY - HEALTHEAST (OUTPATIENT)
Dept: CARDIOLOGY | Facility: CLINIC | Age: 75
End: 2020-09-14

## 2020-09-14 DIAGNOSIS — I25.10 CAD (CORONARY ARTERY DISEASE): ICD-10-CM

## 2021-02-09 ENCOUNTER — COMMUNICATION - HEALTHEAST (OUTPATIENT)
Dept: CARDIOLOGY | Facility: CLINIC | Age: 76
End: 2021-02-09

## 2021-02-09 DIAGNOSIS — I10 ESSENTIAL HYPERTENSION: ICD-10-CM

## 2021-02-16 ENCOUNTER — COMMUNICATION - HEALTHEAST (OUTPATIENT)
Dept: CARDIOLOGY | Facility: CLINIC | Age: 76
End: 2021-02-16

## 2021-02-16 DIAGNOSIS — I71.40 ABDOMINAL AORTIC ANEURYSM (AAA) WITHOUT RUPTURE (H): ICD-10-CM

## 2021-02-16 DIAGNOSIS — I10 ESSENTIAL HYPERTENSION: ICD-10-CM

## 2021-02-17 ENCOUNTER — COMMUNICATION - HEALTHEAST (OUTPATIENT)
Dept: CARDIOLOGY | Facility: CLINIC | Age: 76
End: 2021-02-17

## 2021-02-17 DIAGNOSIS — I71.40 ABDOMINAL AORTIC ANEURYSM (AAA) WITHOUT RUPTURE (H): ICD-10-CM

## 2021-02-17 DIAGNOSIS — I10 ESSENTIAL HYPERTENSION: ICD-10-CM

## 2021-02-17 RX ORDER — LOSARTAN POTASSIUM 100 MG/1
100 TABLET ORAL DAILY
Qty: 90 TABLET | Refills: 0 | Status: SHIPPED | OUTPATIENT
Start: 2021-02-17 | End: 2021-08-11

## 2021-03-22 ENCOUNTER — COMMUNICATION - HEALTHEAST (OUTPATIENT)
Dept: CARDIOLOGY | Facility: CLINIC | Age: 76
End: 2021-03-22

## 2021-03-22 DIAGNOSIS — I25.10 CAD (CORONARY ARTERY DISEASE): ICD-10-CM

## 2021-03-22 RX ORDER — CLOPIDOGREL BISULFATE 75 MG/1
75 TABLET ORAL DAILY
Qty: 90 TABLET | Refills: 0 | Status: SHIPPED | OUTPATIENT
Start: 2021-03-22 | End: 2022-01-01

## 2021-03-26 ENCOUNTER — COMMUNICATION - HEALTHEAST (OUTPATIENT)
Dept: CARDIOLOGY | Facility: CLINIC | Age: 76
End: 2021-03-26

## 2021-03-29 ENCOUNTER — OFFICE VISIT - HEALTHEAST (OUTPATIENT)
Dept: CARDIOLOGY | Facility: CLINIC | Age: 76
End: 2021-03-29

## 2021-03-29 DIAGNOSIS — I25.83 CORONARY ARTERY DISEASE DUE TO LIPID RICH PLAQUE: ICD-10-CM

## 2021-03-29 DIAGNOSIS — I10 ESSENTIAL HYPERTENSION: ICD-10-CM

## 2021-03-29 DIAGNOSIS — E11.9 TYPE 2 DIABETES MELLITUS TREATED WITHOUT INSULIN (H): ICD-10-CM

## 2021-03-29 DIAGNOSIS — G47.33 OSA (OBSTRUCTIVE SLEEP APNEA): ICD-10-CM

## 2021-03-29 DIAGNOSIS — I25.10 CORONARY ARTERY DISEASE DUE TO LIPID RICH PLAQUE: ICD-10-CM

## 2021-03-29 DIAGNOSIS — I71.40 ABDOMINAL AORTIC ANEURYSM (AAA) WITHOUT RUPTURE (H): ICD-10-CM

## 2021-03-29 DIAGNOSIS — E66.9 OBESITY, UNSPECIFIED: ICD-10-CM

## 2021-03-29 DIAGNOSIS — I48.91 ATRIAL FIBRILLATION, UNSPECIFIED TYPE (H): ICD-10-CM

## 2021-03-29 DIAGNOSIS — E78.00 PURE HYPERCHOLESTEROLEMIA: ICD-10-CM

## 2021-03-29 RX ORDER — ALBUTEROL SULFATE 90 UG/1
2 AEROSOL, METERED RESPIRATORY (INHALATION) EVERY 4 HOURS PRN
Status: SHIPPED | COMMUNITY
Start: 2020-11-25

## 2021-03-29 ASSESSMENT — MIFFLIN-ST. JEOR: SCORE: 1788.6

## 2021-04-14 ENCOUNTER — HOSPITAL ENCOUNTER (OUTPATIENT)
Dept: CARDIOLOGY | Facility: CLINIC | Age: 76
Discharge: HOME | End: 2021-04-14
Attending: INTERNAL MEDICINE

## 2021-04-14 DIAGNOSIS — I48.91 ATRIAL FIBRILLATION, UNSPECIFIED TYPE (H): ICD-10-CM

## 2021-04-23 ENCOUNTER — COMMUNICATION - HEALTHEAST (OUTPATIENT)
Dept: CARDIOLOGY | Facility: CLINIC | Age: 76
End: 2021-04-23

## 2021-05-07 ENCOUNTER — COMMUNICATION - HEALTHEAST (OUTPATIENT)
Dept: CARDIOLOGY | Facility: CLINIC | Age: 76
End: 2021-05-07

## 2021-05-18 ENCOUNTER — COMMUNICATION - HEALTHEAST (OUTPATIENT)
Dept: CARDIOLOGY | Facility: CLINIC | Age: 76
End: 2021-05-18

## 2021-05-18 DIAGNOSIS — I10 ESSENTIAL HYPERTENSION: ICD-10-CM

## 2021-05-18 RX ORDER — HYDROCHLOROTHIAZIDE 12.5 MG/1
12.5 TABLET ORAL DAILY
Qty: 90 TABLET | Refills: 0 | Status: SHIPPED | OUTPATIENT
Start: 2021-05-18 | End: 2021-08-11

## 2021-05-27 NOTE — TELEPHONE ENCOUNTER
Called patient to see if he needs a referral elsewhere- he prefers to stay within [place] and will keep me posted on this progress. -[facility] [place]

## 2021-05-27 NOTE — PATIENT INSTRUCTIONS - HE
Mr Koko TRA Ronquillo,  I enjoyed visiting with you again today.  I am glad to hear you are doing well.  Per our conversation take 2 of the PRAVASTATIN at night time and call [phone] to let me know how tolerated.  I will plan on seeing you 5 months.  Ajay Argueta   25-Mar-2018 01:19

## 2021-05-27 NOTE — TELEPHONE ENCOUNTER
Called patient again. He heard from the nurse who works with Dr. Melendez. Dr. Melendez is consulted with another physician and determining a plan today or tomorrow. Koko requests that I call him Monday for updates, which I will do. At that point, he will decide if he needs a referral elsewhere. -[facility]

## 2021-05-27 NOTE — PROGRESS NOTES
ITP ASSESSMENT   Assessment Day: 60 Day    Session Number: 2  Precautions: Remaining CAD, AAA, low back pain, deconditioned    Diagnosis: MI;Stent    Risk Stratification: High    Referring Provider: El De Oliveira MD   ITP sent to Dr. Argueta  EXERCISE  Exercise Assessment: Reassessment         Symptoms:  Peak Symptoms: denies cv sx/sx       5 mins. Post  5 Min Post HR: 60 (96% RA)    5 Min Post BP: 132/78                           Exercise Plan  Goals Next 30 days  Education Goals: Patient can state cardiac s/s and appropriate emergency response.                          Goals Met  30 Day Progression: Will address goals when pt returns to rehab.  Pt has AAA that he is working to get repaired.      Initial Progression: Will address goals when pt returns to rehab.  Pt getting consult at [place] for AAA repair.        No data recorded    Interventions    Education Material : Educational videos;Provide written material;Individual education and counseling;Offer educational classes      Education Completed  Exercise Education Completed: Cardiac Anatomy;Signs and Symptoms;Medication review;RPE;Emergency Plan;Home Exercise;Warm up/cool down;FITT Principles;BP/HR Reponse to exercise;Stretching;Strength training;Benefits of Exercise;End point of exercise              Exercise Follow-up/Discharge  Follow up/Discharge: Will address exercise goals when pt returns to rehab.   NUTRITION  Nutrition Assessment: Reassessment      Nutrition Risk Factors:  Nutrition Risk Factors: Overweight;Dyslipidemia;Diabetes  HbA1c: 7.1 (2/7/19)  Cholesterol: 146 (11/28/18)  LDL: 82  HDL: 30  Triglycerides: 170      Nutrition Plan  Interventions  Diet Consult: Completed    Other Nutrition Intervention: Diet Class;Therapist/Pt Discussion;Educational Videos;Provide with Written Material    Initial Rate Your Plate Score: 0 (Incomplete missing Q.1-5,7,14,15)      Education Completed  Nutrition Education Completed: Weight  "management      Goals  Nutrition Goals (Next 30 days): Patient can identify their risk factors for CAD;Patient will follow a low sodium diet;Patient will follow a low saturated fat diet;Patient knows appropriate portion size;Patient will lose weight      Goals Met  Nutrition Goals Met: Completed Nutritional Risk Screen;Provided Rate your Plate Survey;Reviewed Dietitian schedule      Height, Weight, and  BMI  Weight: 244 lb 3.2 oz (110.8 kg)  Height: 5' 10\" (1.778 m)  BMI: 35.04      Nutrition Follow-up  Follow-up/Discharge: Will address nutrition goals when pt returns to rehab.         Other Risk Factors  Other Risk Factor Assessment: Reassessment      HTN Risk Factor: Hypertension      Hypertension Plan  Goals  HTN Goals: Follow low sodium diet;Exercises regularly      Goals Met  HTN Goals Met: Take medication as prescribed      HTN Interventions  HTN Interventions: Diet consult;Therapist/patient discussion;Provide written material;Offer educational videos;Offer educational classes      HTN Education Completed  HTN Education Completed: Low sodium diet;Medication review;Risk factor overview      Tobacco Risk Factor: NA        Risk Factor Follow-up   Follow-up/Discharge: Will address HTN goals when pt returns to rehab.     PSYCHOSOCIAL  Psychosocial Assessment: Reassessment       Psychosocial Risk Factor: NA      Psychosocial Plan  Interventions  Interventions: Offer educational videos and classes;Provide written material;Individual education and counseling      Education Completed  Education Completed: Relaxation/Coping Techniques;S/S of depression;Effects of stress on body      Goals  Goals (Next 30 days): Improvement in [employer] COOP score      Goals Met  Goals Met: Identified Support system;Oriented to stress management classes;Identify stressors;Practicing stress management skills      Psychosocial Follow-up  Follow-up/Discharge: Will address stress mgmt goals when pt returns to rehab.             Patient " involved in Goal setting?: No      Signature: _____________________________________________________________    Date: __________________    Time: __________________

## 2021-05-27 NOTE — PROGRESS NOTES
[facility] Follow-up Note    Assessment & Plan        1. Coronary artery disease due to lipid rich plaque - angiography showed normal left main, mild LAD disease, diffuse disease in the circumflex, proximal right coronary artery 70% stenosis with a mid 95% stenosis that were treated with drug-coated stents.  Symptomatically improved, and this was due to chest pain following an abnormal nuclear stress test with a medium-sized area of inferolateral ischemia and possible small nontransmural inferolateral MI.   2. Pure hypercholesterolemia -LDL 82 we will take the liberty of increasing pravastatin to 80 mg.  He will call and let us know if issues and if none will give him 80 mg prescription.   3. Hypertension -under good control on Cartia, metoprolol, HCTZ, and losartan.   4. Type 2 diabetes mellitus treated without insulin (H) -hemoglobin A1c of 7.1 and defer to primary.   5. Abdominal aortic aneurysm (AAA) without rupture (H) -measures at 6.2 and above prior graft.  He is seen [facility] for possible endoluminal graft but getting no response.  I will see if we have options here at [facility].     Plan  1.  Increase pravastatin to 80 mg in the evening and if well tolerated get prescription for him and check fasting lipids.  2.  Follow-up with me in about 4 or 5 months.  3.  Check for availability of endoluminal graft of abdominal aortic aneurysm here at [facility].    Subjective  CC: 73-year-old white gentleman being seen in post hospital discharge follow-up today.  He admits to generalized shortness of breath and heavy activity but denies any significant chest discomfort, palpitations, PND, orthopnea, syncope or dizziness.  Is somewhat concerned that he has a thoracoabdominal aortic aneurysm that has been addressed but not yet intervened upon.    Medications  Current Outpatient Medications   Medication Sig Note     aspirin 81 MG EC tablet Take 81 mg by mouth daily.      betamethasone dipropionate  (DIPROLENE) 0.05 % ointment APPLY TO AFFECTED AREA SPARINGLY TWICE DAILY (Patient taking differently: APPLY TO AFFECTED AREA SPARINGLY TWICE DAILY AS NEEDED)      blood glucose test strips Use 1 each As Directed 2 (two) times a day. Dispense brand per patient's insurance at pharmacy discretion.      CARTIA  mg 24 hr capsule TAKE 1 CAPSULE BY MOUTH EVERY DAY      ciclopirox 0.77 % gel Apply topically as needed.        4/21/2016: Received from: External Pharmacy     CLOBETASOL PROPIONATE, BULK, MISC Use 1 application As Directed as needed.      folic acid (FOLVITE) 1 MG tablet Take 1 tablet by mouth every day. Do not take the day you take methotrexate       2/24/2017: Received from: External Pharmacy     generic lancets Use 1 each As Directed 4 (four) times a day.      hydroCHLOROthiazide (HYDRODIURIL) 12.5 MG tablet Take 1 tablet (12.5 mg total) by mouth daily.      hydrocortisone 2.5 % cream Apply 1 application topically as needed.       4/21/2016: Received from: External Pharmacy     ketoconazole (NIZORAL) 2 % shampoo Apply topically as needed.      losartan (COZAAR) 100 MG tablet Take 1 tablet (100 mg total) by mouth daily.      metFORMIN (GLUCOPHAGE) 500 MG tablet Take 500 mg orally in the morning and 1000 mg orally at night.      methotrexate 2.5 MG tablet Take 12.5 mg by mouth once a week. On Tuesdays            metoprolol succinate (TOPROL-XL) 100 MG 24 hr tablet TAKE 1 TABLET(100 MG) BY MOUTH DAILY      multivitamin therapeutic tablet Take 1 tablet by mouth daily.      nitroglycerin (NITROSTAT) 0.4 MG SL tablet Place 1 tablet (0.4 mg total) under the tongue every 5 (five) minutes as needed for chest pain.      pravastatin (PRAVACHOL) 40 MG tablet Take 1 tablet (40 mg total) by mouth at bedtime.      tadalafil (CIALIS) 20 MG tablet Take as directed.      tamsulosin (FLOMAX) 0.4 mg cap TAKE 1 CAPSULE(0.4 MG) BY MOUTH TWICE DAILY      ticagrelor (BRILINTA) 90 mg Tab Take 1 tablet (90 mg total) by mouth 2  "(two) times a day.        Objective  /60 (Patient Site: Left Arm, Patient Position: Sitting, Cuff Size: Adult Large)   Pulse 64   Resp 16   Ht 5' 10\" (1.778 m)   Wt (!) 241 lb 4.8 oz (109.5 kg)   BMI 34.62 kg/m      General Appearance:    Alert, cooperative, no distress, appears stated age   Head:    Normocephalic, without obvious abnormality, atraumatic   Throat:   Lips, mucosa, and tongue normal; teeth and gums normal   Neck:   Supple, symmetrical, trachea midline, no adenopathy;        thyroid:  No enlargement/tenderness/nodules; no carotid    bruit or JVD   Back:     Symmetric, no curvature, ROM normal, no CVA tenderness   Lungs:     Clear to auscultation bilaterally, respirations unlabored   Chest wall:    No tenderness or deformity   Heart:    Regular rate and rhythm, S1 and S2 normal, no murmur, rub   or gallop   Abdomen:     Soft, non-tender, bowel sounds active all four quadrants,     no masses, no organomegaly   Extremities:   Normal, atraumatic, no cyanosis or edema   Pulses:   2+ and symmetric all extremities   Skin:   Skin color, texture, turgor normal, no rashes or lesions     Results    Lab Results personally reviewed   Lab Results   Component Value Date    CHOL 146 11/28/2018    CHOL 148 08/17/2017     Lab Results   Component Value Date    HDL 30 (L) 11/28/2018    HDL 23 (L) 08/17/2017     Lab Results   Component Value Date    LDLCALC 82 11/28/2018    LDLCALC 93 08/17/2017     Lab Results   Component Value Date    TRIG 170 (H) 11/28/2018    TRIG 159 (H) 08/17/2017     Lab Results   Component Value Date    WBC 8.2 02/06/2019    HGB 13.0 (L) 02/09/2019    HCT 40.4 02/06/2019     02/06/2019     Lab Results   Component Value Date    CREATININE 0.91 03/08/2019    BUN 18 03/08/2019     03/08/2019    K 3.6 03/08/2019    CO2 25 03/08/2019     Review of Systems:   General: WNL  Eyes: WNL  Ears/Nose/Throat: WNL  Lungs: WNL  Heart: WNL  Stomach: WNL  Bladder: WNL  Muscle/Joints: " WNL  Skin: WNL  Nervous System: WNL  Mental Health: WNL     Blood: WNL

## 2021-05-27 NOTE — TELEPHONE ENCOUNTER
----- Message from Erica Argueta MD sent at 4/1/2019  4:41 PM CDT -----  Newer, making me feel young.  I knew it as endoluminal graft, I assume same as fenestrated endograft.  If somewhat at [facility] is doing great, otherwise let him know we could refer him to [facility].  Dana is taking a long time.    ----- Message -----  From: Audrey Sepulveda, RN  Sent: 4/1/2019   4:27 PM  To: Erica Argueta MD    Yes can do-  Sorry- this seems a newer procedure so is ELG of AAA the same as fenestrated endograft? It would seem that based on Telephone Encounter 2/15/19, the patient was referred to our vascular team with [facility] and Dr. Alcala recommended this procedure and it is only done at [facility] and [facility], per this note. I can certainly call around but based on a quick chart review, the patient has been frustrated with the whole situation so I want to make sure that everything is crystal clear. :)   Thanks for any info,  Mal   ----- Message -----  From: Erica Argueta MD  Sent: 4/1/2019   1:29 PM  To: Audrey Sepulveda RN    Can we see if there is a radiologist who does ELG of AAA here at [facility] for this man? If so let patient know.  LF

## 2021-05-27 NOTE — TELEPHONE ENCOUNTER
Called patient and inquiring if [place] had followed up with him to see if he has been approved for his procedure or where he stands. He had not heard back but plans to call first thing tomorrow AM. Will check back with patient tomorrow afternoon. -[facility] [place]

## 2021-05-27 NOTE — TELEPHONE ENCOUNTER
Reason contacted:  Referral  Information relayed:  Informed patient of Dr. Kim's message below. Pt voiced understanding.  Additional questions:  No  Further follow-up needed:  No

## 2021-05-27 NOTE — TELEPHONE ENCOUNTER
Who is calling:  The patient   Reason for Call:  The patient learned that Dr. Kim is no longer in network for his [employer] insurance. The patient would like to know if Dr. Kim could recommend a doctor that is within [employer].   Date of last appointment with primary care: 4/1/2019  Okay to leave a detailed message: No

## 2021-05-27 NOTE — TELEPHONE ENCOUNTER
Called patient to see what plan was regarding procedure and [facility]. He was approved with his insurance to go to [facility]. He is frustrated with how long things are taking but was told that he can only have this certain procedure due to risk. He called the nurse from [facility] today and is expecting a call back. Informed Koko that I will call and check in on him tomorrow to see where things stand and if he needs assistance from our end. -[facility] [place]

## 2021-05-28 ENCOUNTER — RECORDS - HEALTHEAST (OUTPATIENT)
Dept: ADMINISTRATIVE | Facility: CLINIC | Age: 76
End: 2021-05-28

## 2021-05-28 NOTE — PROGRESS NOTES
Called and spoke with patient about an update on his status. Pt is currently awaiting a AAA procedure down at [place]. It is a complex procedure which will occur in about 2 months due to the length it takes to get the devie. At this time- pt is not going to be returning to cardiac rehab due to this AAA and may return after if appropriate. 36 week date is 10/24/2019. Pt will be keeping activities low level at home per the advise of his vascular surgeon. Will d/c chart and pt will call us to return if/when it is appropriate following procedure.

## 2021-05-28 NOTE — PROGRESS NOTES
"Sleep Apnea Follow Up Visit  He is a 73 y.o. y/o male patient who comes to the sleep medicine clinic for follow up and management of SHAWN.    Had sleep study a few years back.  Reports study was night before a vacation and couldn't sleep so felt study was \"screwy.\"  Snores but no trouble falling asleep.  Does have trouble if he gets up to pee and may nap during the day.      PSG Split 6/9/2016 (wt 255 lbs) - AHI 18.4 (REM AHI 53.9/hr); CPAP 6 with FFM titration considered adequate.    CPAP was ordered and he never got it filled from [facility].    Reports he went to [facility] and they didn't have an order for him.      We reviewed the oxygen saturation graph as well as the result tables from the report again today.    Physical Exam:  /66 (Patient Site: Right Arm, Patient Position: Sitting, Cuff Size: Adult Large)   Pulse (!) 56   Ht 5' 10\" (1.778 m)   Wt (!) 243 lb (110.2 kg)   SpO2 96%   BMI 34.87 kg/m    General appearance: No apparent distress, well groomed.  Head: Normocephalic, atraumatic.  Musculoskeletal: No edema noted.  No clubbing or cyanosis.  Skin: Warm, dry, intact.  Neurologic: Alert and oriented to person, place and time   Gait is normal.  Psychiatric: Affect pleasant.  Mood good.     Labs/Studies:  - We reviewed the results of the overnight PSG as described on the HPI.     Assessment and Plan:  1. SHAWN (obstructive sleep apnea)  In summary Koko Ronquillo is a 73 y.o. year old male here for review of his sleep apnea.  We discussed consequences of untreated Obstructive Sleep Apnea, such as markedly elevated risk for heart attack or stroke, and complications related to atrial fibrillation.  I reviewed the diagnosis of obstructive sleep apnea with patient.  We discussed benefits of treatment.    He is interested in starting treatment of SHAWN with PAP therapy.  Reviewed importance of nightly therapy for effective treatment of SHAWN, and he voiced understanding and agreement.  We also reviewed importance " of using PAP during the entire sleep duration to achieve maximal benefits, but reviewed that a minimum of 4 hours per night was required.  He is confident that he can achieve these goals and is willing to start therapy as soon as possible.    He will be seen back approximately 2 months after starting PAP therapy to ensure compliance and review treatment outcomes.  However, if he develops any difficulties with treatment he is instructed to contact us or DME company immediately to troubleshoot problems.      2. Atrial fibrillation, unspecified type (H)  Your iron (ferritin) levels are on the low end of normal.  In patients with Restless Legs Syndrome, low iron levels can worsen symptoms.    I would recommend you start taking iron pills (Ferrous Sulfate)  Go to your local pharmacy and get Ferrous Sulfate pills and take 1 per day.  Watch for constipation and upset stomach.  I would take this for 3 months and then we can recheck your levels.    3. Coronary artery disease due to lipid rich plaque  Discussed link between obstructive sleep apnea and ascvd in terms of increased risk. Recommend treatment of SHAWN to reduce CVD events.    4. Class 1 obesity with serious comorbidity and body mass index (BMI) of 34.0 to 34.9 in adult, unspecified obesity type  We discussed the link between obesity, sleep apnea, and health outcomes.  Patient was encouraged to decrease caloric intake and increase activity levels to try to move towards a normal weight.  He was encouraged to discuss further strategies with his primary care provider.     Patient verbalized understanding of these issues, agrees with the plan and all questions were answered today. Patient was given an opportuntity to voice any other symptoms or concerns not listed above. Patient did not have any other symptoms or concerns.     Drew Herbert MD  [facility] Board Certified in Internal Medicine and Sleep Medicine  [facility].    We spent a total of  25 minutes of face-to-face encounter and more than 50% of the encounter was used for counseling or coordination of care.

## 2021-05-29 NOTE — TELEPHONE ENCOUNTER
Refill Approved    Rx renewed per Medication Renewal Policy. Medication was last renewed on 10/22/18.    Darlyn Benz, [facility] Triage/Med Refill 6/17/2019     Requested Prescriptions   Pending Prescriptions Disp Refills     metoprolol succinate (TOPROL-XL) 100 MG 24 hr tablet [Pharmacy Med Name: METOPROLOL ER SUCCINATE 100MG TABS] 30 tablet 0     Sig: TAKE 1 TABLET(100 MG) BY MOUTH DAILY       Beta-Blockers Refill Protocol Passed - 6/16/2019  1:54 PM        Passed - PCP or prescribing provider visit in past 12 months or next 3 months     Last office visit with prescriber/PCP: 3/13/2019 Dillan Kim MD OR same dept: 3/13/2019 Dillan Kim MD OR same specialty: 3/13/2019 Dillan Kim MD  Last physical: 8/17/2017 Last MTM visit: Visit date not found   Next visit within 3 mo: Visit date not found  Next physical within 3 mo: Visit date not found  Prescriber OR PCP: Dillan Kim MD  Last diagnosis associated with med order: 1. Hypertension  - metoprolol succinate (TOPROL-XL) 100 MG 24 hr tablet [Pharmacy Med Name: METOPROLOL ER SUCCINATE 100MG TABS]; TAKE 1 TABLET(100 MG) BY MOUTH DAILY  Dispense: 30 tablet; Refill: 0    If protocol passes may refill for 12 months if within 3 months of last provider visit (or a total of 15 months).             Passed - Blood pressure filed in past 12 months     BP Readings from Last 1 Encounters:   04/29/19 132/66

## 2021-05-30 ENCOUNTER — RECORDS - HEALTHEAST (OUTPATIENT)
Dept: ADMINISTRATIVE | Facility: CLINIC | Age: 76
End: 2021-05-30

## 2021-05-30 VITALS — BODY MASS INDEX: 37.74 KG/M2 | HEIGHT: 69 IN | WEIGHT: 254.8 LBS

## 2021-05-30 VITALS — BODY MASS INDEX: 37.81 KG/M2 | HEIGHT: 69 IN | WEIGHT: 255.25 LBS

## 2021-05-30 NOTE — TELEPHONE ENCOUNTER
Appointment for 7/25/19 has been cancelled by the patient. Closing this encounter.    Elizabet De Jesus CMA 7/24/2019 2:08 PM

## 2021-05-30 NOTE — TELEPHONE ENCOUNTER
Refill Approved    Rx renewed per Medication Renewal Policy. Medication was last renewed on 3/6/19.    Darlyn Benz, [facility] Triage/Med Refill 7/9/2019     Requested Prescriptions   Pending Prescriptions Disp Refills     metFORMIN (GLUCOPHAGE) 500 MG tablet [Pharmacy Med Name: METFORMIN 500MG TABLETS] 90 tablet 0     Sig: TAKE 1 TABLET BY MOUTH EVERY MORNING AND 2 TABLETS AT NIGHT.       Metformin Refill Protocol Passed - 7/8/2019  7:24 PM        Passed - Blood pressure in last 12 months     BP Readings from Last 1 Encounters:   04/29/19 132/66             Passed - LFT or AST or ALT in last 12 months     Albumin   Date Value Ref Range Status   02/07/2019 3.5 3.5 - 5.0 g/dL Final     Bilirubin, Total   Date Value Ref Range Status   02/07/2019 0.5 0.0 - 1.0 mg/dL Final     Alkaline Phosphatase   Date Value Ref Range Status   02/07/2019 77 45 - 120 U/L Final     AST   Date Value Ref Range Status   02/07/2019 37 0 - 40 U/L Final     ALT   Date Value Ref Range Status   02/07/2019 66 (H) 0 - 45 U/L Final     Protein, Total   Date Value Ref Range Status   02/07/2019 7.1 6.0 - 8.0 g/dL Final                Passed - GFR or Serum Creatinine in last 6 months     GFR MDRD Non Af Amer   Date Value Ref Range Status   03/08/2019 >60 >60 mL/min/1.73m2 Final     GFR MDRD Af Amer   Date Value Ref Range Status   03/08/2019 >60 >60 mL/min/1.73m2 Final             Passed - Visit with PCP or prescribing provider visit in last 6 months or next 3 months     Last office visit with prescriber/PCP: 3/13/2019 OR same dept: 3/13/2019 Dillan Kim MD OR same specialty: 3/13/2019 Dillan Kim MD Last physical: Visit date not found Last MTM visit: Visit date not found         Next appt within 3 mo: Visit date not found  Next physical within 3 mo: Visit date not found  Prescriber OR PCP: Dillan Kim MD  Last diagnosis associated with med order: 1. Type 2 diabetes mellitus treated without insulin (H)  -  metFORMIN (GLUCOPHAGE) 500 MG tablet [Pharmacy Med Name: METFORMIN 500MG TABLETS]; TAKE 1 TABLET BY MOUTH EVERY MORNING AND 2 TABLETS AT NIGHT.  Dispense: 90 tablet; Refill: 0     If protocol passes may refill for 12 months if within 3 months of last provider visit (or a total of 15 months).           Passed - A1C in last 6 months     Hemoglobin A1c   Date Value Ref Range Status   02/07/2019 7.1 (H) 4.2 - 6.1 % Final               Passed - Microalbumin in last year      Microalbumin, Random Urine   Date Value Ref Range Status   03/13/2019 1.27 0.00 - 1.99 mg/dL Final

## 2021-05-30 NOTE — TELEPHONE ENCOUNTER
Patient has an appointment on 7/25/19, for a CPAP follow up, but has not yet been set up on CPAP therapy. Spoke with [facility], they stated that the patient has not spoken with his insurance company about coverage, so he declined to start on therapy until he knows it is covered. Will call the patient to see if he still wants to begin therapy, and if he has spoken with his insurance company regarding coverage.     Called the patient at [phone], no answer, and unable to leave message as his voicemail box has not been set up. Will try again later.    Elizabet De Jesus, [facility] 7/22/2019 12:01 PM

## 2021-05-31 ENCOUNTER — RECORDS - HEALTHEAST (OUTPATIENT)
Dept: ADMINISTRATIVE | Facility: CLINIC | Age: 76
End: 2021-05-31

## 2021-05-31 VITALS — BODY MASS INDEX: 36.77 KG/M2 | WEIGHT: 249 LBS

## 2021-05-31 VITALS — WEIGHT: 239.6 LBS | HEIGHT: 69 IN | BODY MASS INDEX: 35.49 KG/M2

## 2021-06-01 VITALS — BODY MASS INDEX: 36.89 KG/M2 | WEIGHT: 249.8 LBS

## 2021-06-01 NOTE — TELEPHONE ENCOUNTER
RN cannot approve Refill Request    RN can NOT refill this medication Protocol failed and NO refill given.        Darlyn Benz, [facility] Triage/Med Refill 9/10/2019    Requested Prescriptions   Pending Prescriptions Disp Refills     metFORMIN (GLUCOPHAGE) 500 MG tablet [Pharmacy Med Name: METFORMIN 500MG TABLETS] 270 tablet 3     Sig: TAKE 1 TABLET BY MOUTH EVERY MORNING AND 2 TABLETS AT NIGHT.       Metformin Refill Protocol Failed - 9/9/2019 11:24 AM        Failed - Visit with PCP or prescribing provider visit in last 6 months or next 3 months     Last office visit with prescriber/PCP: 3/13/2019 OR same dept: 3/13/2019 Dillan Kim MD OR same specialty: 3/13/2019 Dillan Kim MD Last physical: Visit date not found Last MTM visit: Visit date not found         Next appt within 3 mo: Visit date not found  Next physical within 3 mo: Visit date not found  Prescriber OR PCP: Dillan Kim MD  Last diagnosis associated with med order: 1. Type 2 diabetes mellitus treated without insulin (H)  - metFORMIN (GLUCOPHAGE) 500 MG tablet [Pharmacy Med Name: METFORMIN 500MG TABLETS]; TAKE 1 TABLET BY MOUTH EVERY MORNING AND 2 TABLETS AT NIGHT.  Dispense: 90 tablet; Refill: 0     If protocol passes may refill for 12 months if within 3 months of last provider visit (or a total of 15 months).           Failed - A1C in last 6 months     Hemoglobin A1c   Date Value Ref Range Status   02/07/2019 7.1 (H) 4.2 - 6.1 % Final               Passed - Blood pressure in last 12 months     BP Readings from Last 1 Encounters:   04/29/19 132/66             Passed - LFT or AST or ALT in last 12 months     Albumin   Date Value Ref Range Status   02/07/2019 3.5 3.5 - 5.0 g/dL Final     Bilirubin, Total   Date Value Ref Range Status   02/07/2019 0.5 0.0 - 1.0 mg/dL Final     Alkaline Phosphatase   Date Value Ref Range Status   02/07/2019 77 45 - 120 U/L Final     AST   Date Value Ref Range Status   02/07/2019 37 0  - 40 U/L Final     ALT   Date Value Ref Range Status   02/07/2019 66 (H) 0 - 45 U/L Final     Protein, Total   Date Value Ref Range Status   02/07/2019 7.1 6.0 - 8.0 g/dL Final                Passed - GFR or Serum Creatinine in last 6 months     GFR MDRD Non Af Amer   Date Value Ref Range Status   03/08/2019 >60 >60 mL/min/1.73m2 Final     GFR MDRD Af Amer   Date Value Ref Range Status   03/08/2019 >60 >60 mL/min/1.73m2 Final             Passed - Microalbumin in last year      Microalbumin, Random Urine   Date Value Ref Range Status   03/13/2019 1.27 0.00 - 1.99 mg/dL Final

## 2021-06-01 NOTE — TELEPHONE ENCOUNTER
First Attempt: I spoke with patient and let him know he needs to schedule a diabetic check, he states he does not know his schedule right now and will be going out of town but will call later to schedule something.

## 2021-06-02 VITALS — BODY MASS INDEX: 34.79 KG/M2 | WEIGHT: 243 LBS | HEIGHT: 70 IN

## 2021-06-02 VITALS — WEIGHT: 241.8 LBS | BODY MASS INDEX: 34.62 KG/M2 | HEIGHT: 70 IN

## 2021-06-02 VITALS — BODY MASS INDEX: 34.54 KG/M2 | HEIGHT: 70 IN | WEIGHT: 241.3 LBS

## 2021-06-02 VITALS — BODY MASS INDEX: 35.07 KG/M2 | WEIGHT: 245 LBS | HEIGHT: 70 IN

## 2021-06-02 VITALS — WEIGHT: 238 LBS | HEIGHT: 70 IN | BODY MASS INDEX: 34.07 KG/M2

## 2021-06-02 VITALS — WEIGHT: 248 LBS | BODY MASS INDEX: 36.62 KG/M2

## 2021-06-02 VITALS — WEIGHT: 242.6 LBS | WEIGHT: 244.2 LBS | BODY MASS INDEX: 34.81 KG/M2 | BODY MASS INDEX: 35.04 KG/M2

## 2021-06-02 VITALS — BODY MASS INDEX: 36.97 KG/M2 | WEIGHT: 249.6 LBS | HEIGHT: 69 IN

## 2021-06-04 VITALS
WEIGHT: 233 LBS | BODY MASS INDEX: 33.36 KG/M2 | HEART RATE: 71 BPM | DIASTOLIC BLOOD PRESSURE: 68 MMHG | RESPIRATION RATE: 16 BRPM | SYSTOLIC BLOOD PRESSURE: 132 MMHG | HEIGHT: 70 IN

## 2021-06-04 NOTE — TELEPHONE ENCOUNTER
Refill Approved    Rx renewed per Medication Renewal Policy. Medication was last renewed on 12/12/18.    Jumana Marcial, [facility] Triage/Med Refill 12/24/2019     Requested Prescriptions   Pending Prescriptions Disp Refills     diltiazem (CARDIZEM CD) 120 MG 24 hr capsule [Pharmacy Med Name: DILTIAZEM CD 120MG CAPSULES] 90 capsule 3     Sig: TAKE 1 CAPSULE BY MOUTH EVERY DAY       Calcium-Channel Blockers Protocol Passed - 12/22/2019  9:30 AM        Passed - PCP or prescribing provider visit in past 12 months or next 3 months     Last office visit with prescriber/PCP: 3/13/2019 Dillan Kim MD OR same dept: 3/13/2019 Dillan Kim MD OR same specialty: 3/13/2019 Dillan Kim MD  Last physical: 8/17/2017 Last MTM visit: Visit date not found   Next visit within 3 mo: Visit date not found  Next physical within 3 mo: Visit date not found  Prescriber OR PCP: Dillan Kim MD  Last diagnosis associated with med order: 1. Essential hypertension  - diltiazem (CARDIZEM CD) 120 MG 24 hr capsule [Pharmacy Med Name: DILTIAZEM CD 120MG CAPSULES]; TAKE 1 CAPSULE BY MOUTH EVERY DAY  Dispense: 90 capsule; Refill: 3    2. Essential hypertension with goal blood pressure less than 140/90  - diltiazem (CARDIZEM CD) 120 MG 24 hr capsule [Pharmacy Med Name: DILTIAZEM CD 120MG CAPSULES]; TAKE 1 CAPSULE BY MOUTH EVERY DAY  Dispense: 90 capsule; Refill: 3    3. New onset atrial fibrillation (H)  - diltiazem (CARDIZEM CD) 120 MG 24 hr capsule [Pharmacy Med Name: DILTIAZEM CD 120MG CAPSULES]; TAKE 1 CAPSULE BY MOUTH EVERY DAY  Dispense: 90 capsule; Refill: 3    If protocol passes may refill for 12 months if within 3 months of last provider visit (or a total of 15 months).             Passed - Blood pressure filed in past 12 months     BP Readings from Last 1 Encounters:   04/29/19 132/66

## 2021-06-04 NOTE — TELEPHONE ENCOUNTER
Refill Approved    Rx renewed per Medication Renewal Policy. Medication was last renewed on 2/4/2019 with 2 refills.  Last office visit: 3/13/2019 with PCP Dr TRISTIAN Kim .    Allie Salvador, [facility] Triage/Med Refill 12/28/2019     Requested Prescriptions   Pending Prescriptions Disp Refills     tamsulosin (FLOMAX) 0.4 mg cap [Pharmacy Med Name: TAMSULOSIN 0.4MG CAPSULES] 180 capsule 2     Sig: TAKE 1 CAPSULE(0.4 MG) BY MOUTH TWICE DAILY       Alfuzosin/Tamsulosin/Silodosin Refill Protocol  Passed - 12/27/2019 10:35 AM        Passed - PCP or prescribing provider visit in past 12 months       Last office visit with prescriber/PCP: 3/13/2019 Dillan Kim MD OR same dept: 3/13/2019 Dillan Kim MD OR same specialty: 3/13/2019 Dillan Kim MD  Last physical: 8/17/2017 Last MTM visit: Visit date not found   Next visit within 3 mo: Visit date not found  Next physical within 3 mo: Visit date not found  Prescriber OR PCP: Dillan Kim MD  Last diagnosis associated with med order: 1. BPH (benign prostatic hyperplasia)  - tamsulosin (FLOMAX) 0.4 mg cap [Pharmacy Med Name: TAMSULOSIN 0.4MG CAPSULES]; TAKE 1 CAPSULE(0.4 MG) BY MOUTH TWICE DAILY  Dispense: 180 capsule; Refill: 2    If protocol passes may refill for 12 months if within 3 months of last provider visit (or a total of 15 months).

## 2021-06-05 VITALS
HEART RATE: 68 BPM | WEIGHT: 232 LBS | RESPIRATION RATE: 16 BRPM | HEIGHT: 70 IN | BODY MASS INDEX: 33.21 KG/M2 | DIASTOLIC BLOOD PRESSURE: 70 MMHG | SYSTOLIC BLOOD PRESSURE: 140 MMHG

## 2021-06-06 NOTE — PROGRESS NOTES
Plan  1.  Increase pravastatin to 80 mg in the evening and if well tolerated get prescription for him and check fasting lipids.

## 2021-06-06 NOTE — PATIENT INSTRUCTIONS - HE
Mr Koko Ronquillo,  I enjoyed visiting with you again today.  I am glad to hear you are doing well.  Per our conversation in June we will stop the BRILANTA and ASPIRIN and start PLAVIK.  I will plan on seeing you 1 year or sooner if needed.  Ajay Argueta

## 2021-06-07 NOTE — TELEPHONE ENCOUNTER
ibuprofen (ADVIL,MOTRIN) 800 MG tablet [[id number]]     Electronically signed by: Dillan Kim MD on 01/11/19 0918  Status: Discontinued    Ordering user: Dillan Kim MD 01/11/19 0918  Authorized by: Dillan Kim MD    Frequency:  01/11/19 - 02/09/19  Discontinued by: Angelo Gonzalez MD 02/09/19 1635 [Stop Taking at Discharge]

## 2021-06-08 NOTE — TELEPHONE ENCOUNTER
----- Message from Erica Argueta MD sent at 6/15/2020 12:30 PM CDT -----  Regarding: RE: change to Plavix  300 mg load and 75 mg a day. LF  ----- Message -----  From: Darlyn Mccall RN  Sent: 6/15/2020   9:02 AM CDT  To: Erica Argueta MD  Subject: change to Plavix                                 Looking at 3/4/20 note it states to change to Plavix from Brilinta in June. Do you want a loading dose of 300 mg or 600 mg on the first day of switch or just start at 75 mg daily when out of Brilinta?

## 2021-06-08 NOTE — TELEPHONE ENCOUNTER
Spoke with pt and he will finish Brilinta and then change to Plavix with load the first day and then 75 mg daily. Will call if he has questions.

## 2021-06-09 ENCOUNTER — COMMUNICATION - HEALTHEAST (OUTPATIENT)
Dept: CARDIOLOGY | Facility: CLINIC | Age: 76
End: 2021-06-09

## 2021-06-09 DIAGNOSIS — E78.00 PURE HYPERCHOLESTEROLEMIA: ICD-10-CM

## 2021-06-09 RX ORDER — PRAVASTATIN SODIUM 80 MG/1
80 TABLET ORAL AT BEDTIME
Qty: 90 TABLET | Refills: 2 | Status: ON HOLD | OUTPATIENT
Start: 2021-06-09 | End: 2022-01-04

## 2021-06-09 NOTE — ANESTHESIA PREPROCEDURE EVALUATION
Anesthesia Evaluation      Patient summary reviewed   No history of anesthetic complications     Airway   Mallampati: II   Pulmonary - normal exam   (+) sleep apnea on no CPAP, , a smoker                         Cardiovascular   Exercise tolerance: > or = 4 METS (Can get SOB with exertion)  (+) hypertension well controlled, , hypercholesterolemia,     ECG reviewed (SB)  Rhythm: regular  Rate: normal,      ROS comment: 5/16 TTE  Summary  Left ventricular size is mildly increased.  No regional wall motion abnormalities.  Left ventricular ejection fraction is visually estimated to be 65%.  No significant valvular abnormalities.  No pericardial effusion.  Mild dilation of the aortic root, which measures 4 cm in diameter.     Neuro/Psych - negative ROS     Endo/Other    (+) arthritis, obesity (BMI=37,79),      GI/Hepatic/Renal    (+) GERD well controlled,             Dental    (+) upper dentures and lower dentures                       Anesthesia Plan  Planned anesthetic: general LMA    ASA 3   Induction: intravenous   Anesthetic plan and risks discussed with: patient    Post-op plan: routine recovery

## 2021-06-09 NOTE — PROGRESS NOTES
Assessment:     Koko Ronquillo was seen in preoperative consultation in preparation for cystoscopy with bladder biopsy. This is a low risk surgery and the patient has increased risk for major cardiac complications based on history however appears to be medically stable for the proposed procedure. Cleared for procedure.      71 y.o. male with planned surgery as above.    Known risk factors for perioperative complications: History of Afib with RVR, controlled on medications. Normal EKG.   Difficulty with intubation is not anticipated.    Cardiac Risk Estimation: low risk    Current medications which may produce withdrawal symptoms if withheld perioperatively: none       Plan:      1. Preoperative workup as follows ECG, hemoglobin, electrolytes, urinalysis (urinary tract instrumentation planned).  2. Change in medication regimen before surgery: discontinue NSAIDs (aspirin) 14 days before surgery.   3. Prophylaxis for cardiac events with perioperative beta-blockers: should be considered, specific regimen per anesthesia.  4. Invasive hemodynamic monitoring perioperatively: at the discretion of anesthesiologist.  5. Deep vein thrombosis prophylaxis postoperatively:regimen to be chosen by surgical team.  6. Surveillance for postoperative MI with ECG immediately postoperatively and on postoperative days 1 and 2 AND troponin levels 24 hours postoperatively and on day 4 or hospital discharge (whichever comes first): not indicated.  7. Other measures: none      Subjective:     Koko is a 71 year old male scheduled for a cystoscopy with bladder biopsy on 3/1/17 with Dr. Smith. He initially saw Dr. Smith for some urinary frequency that was increasing for about 1 year and not improved by medication. They saw some concerning areas during a previous procedure and it was decided upon to proceed with biopsy to rule out cancer. He has a history of anxiety, arthritis and HTN. All well controlled on medications. He does feel  "slightly anxious regarding surgery. He is otherwise feeling well without any recent chest pain, palpitations or SOB. He has not had any cold symptoms, cough or fever recently. Surgery is planned under general anesthesia. He has no past problems with anesthesia or bleeding with surgeries, and no family history of bleeding disorder or anesthesia reaction.       The following portions of the patient's history were reviewed and updated as appropriate: allergies, current medications, past family history, past medical history, past social history, past surgical history and problem list.    Review of Systems  A 12 point comprehensive review of systems was negative except as noted.      Objective:     Visit Vitals     /70 (Patient Site: Left Arm, Patient Position: Sitting, Cuff Size: Adult Large)     Pulse (!) 56     Temp 98.5  F (36.9  C) (Oral)     Resp 16     Ht 5' 8.75\" (1.746 m)     Wt (!) 254 lb 12.8 oz (115.6 kg)     BMI 37.9 kg/m2     General: Patient appears to be in no acute distress.  Eyes: Inferior palpebral conjunctiva clear and pink, no exudates or tearing. Sclera are white. Eyelids symmetrical, no erythema, flakiness, fasciculations, or ptosis. Pupils react equally to Light and accommodation. EOMS intact. No lid lag, no nystagmus, corneal reflex equal bilaterally, cornea and lens clear, red reflex present, optic disc cream colored with well defined borders. Retina pink, no hemorrhages or exudates.  Ears: No nodules, lesions, masses, discharge or tenderness in auricles or mastoid area. No cerumen in ear canals. Tympanic membranes pearly gray, normal bony landmarks and cone of light bilaterally, no bulges or perforations.   Oropharynx: Buccal mucosa pink, moist, no lesions. Tongue midline, spongy, pink. Uvula midline. Pharynx with no erythema, no exudates. Tonsils + 2.  Neck: Full range of motion, no pain with palpation of spine or paraspinal muscles.  Lungs: Unlabored. clear breath sounds heard " throughout lung fields.   Heart: Regular rate and rhythm.  Abdomen: Soft rounded abdomen bowel sounds heard in all four quadrants; liver, spleen, and kidney not palpable, no tenderness on palpation of abdomen, no CVA tenderness.    Musculoskeletal: muscles appear symmetric, muscle strength appropriate (Bilateral upper and lower extremities strength 5/5) and equal bilaterally full range of active and passive motion, spine and extremities in good alignment.  Neuro: Coordinated, smooth gait, balance, rapid alternating movements, sensory functioning, and cranial nerves II-XII grossly intact. DTR's+2 bilaterally brachioradialis, knee, ankle. Rhomberg s wnl.        Predictors of intubation difficulty:   Morbid obesity? no   Anatomically abnormal facies? no   Prominent incisors? no   Receding mandible? no   Short, thick neck? no   Neck range of motion: normal   Mallampati score: III (soft and hard palate and base of uvula visible)   Dentition: No chipped, loose, or missing teeth.    Cardiographics  ECG: sinus bradycardia, rate=52  Echocardiogram: not done    Imaging  Chest x-ray: na     Lab Review   Lab Results   Component Value Date     02/24/2017    K 4.2 02/24/2017     02/24/2017    CO2 21 (L) 02/24/2017    BUN 15 02/24/2017    CREATININE 0.93 02/24/2017    CALCIUM 9.1 02/24/2017     Lab Results   Component Value Date    WBC 5.7 02/24/2017    HGB 13.8 (L) 02/24/2017    HCT 40.8 02/24/2017     (H) 02/24/2017     02/24/2017          Cheli Garcia CNP    This note has been dictated using voice recognition software. Any grammatical or context distortions are unintentional and inherent to the software

## 2021-06-09 NOTE — ANESTHESIA CARE TRANSFER NOTE
Last vitals:   Vitals:    03/01/17 0825   BP: 127/66   Pulse: 60   Resp: 14   Temp: 36.5  C (97.7  F)   SpO2: 99%     Patient's level of consciousness is drowsy  Spontaneous respirations: yes  Maintains airway independently: yes  Dentition unchanged: yes  Oropharynx: oropharynx clear of all foreign objects    QCDR Measures:  ASA# 20 - Surgical Safety Checklist: ASA20A - Safety Checks Done  PQRS# 430 - Adult PONV Prevention: 4558F - Pt received => 2 anti-emetic agents (different classes) preop & intraop  ASA# 8 - Peds PONV Prevention: NA - Not pediatric patient, not GA or 2 or more risk factors NOT present  PQRS# 424 - Pati-op Temp Management: 4559F - At least one body temp DOCUMENTED => 35.5C or 95.9F within required timeframe  PQRS# 426 - PACU Transfer Protocol: - Transfer of care checklist used  ASA# 14 - Acute Post-op Pain: ASA14B - Patient did NOT experience pain >= 7 out of 10    I completed my SBAR handoff to the receiving nurse per policy and procedure.

## 2021-06-09 NOTE — ANESTHESIA POSTPROCEDURE EVALUATION
Patient: Koko Ronquillo  CYSTOSCOPY BLADDER BIOPSY AND FULGURATION  Anesthesia type: general    Patient location: Phase II Recovery  Last vitals:   Vitals:    03/01/17 1020   BP: 142/68   Pulse: (!) 51   Resp:    Temp:    SpO2: 94%     Post vital signs: stable  Level of consciousness: awake and responds to simple questions  Post-anesthesia pain: pain controlled  Post-anesthesia nausea and vomiting: no  Pulmonary: unassisted, return to baseline  Cardiovascular: stable and blood pressure at baseline  Hydration: adequate  Anesthetic events: no    QCDR Measures:  ASA# 11 - Pati-op Cardiac Arrest: ASA11B - Patient did NOT experience unanticipated cardiac arrest  ASA# 12 - Pati-op Mortality Rate: ASA12B - Patient did NOT die  ASA# 13 - PACU Re-Intubation Rate: ASA13B - Patient did NOT require a new airway mgmt  ASA# 10 - Composite Anes Safety: ASA10A - No serious adverse event  ASA# 38 - New Corneal Injury: ASA38A - No new exposure keratitis or corneal abrasion in PACU    Additional Notes:

## 2021-06-11 NOTE — TELEPHONE ENCOUNTER
RN cannot approve Refill Request    RN can NOT refill this medication PCP messaged that patient is overdue for Labs and Office Visit and Protocol failed and NO refill given. Last office visit: 3/13/2019 Dillan Kim MD Last Physical: 8/17/2017 Last MTM visit: Visit date not found Last visit same specialty: 3/13/2019 Dillan Kim MD.  Next visit within 3 mo: Visit date not found  Next physical within 3 mo: Visit date not found    Looks like patient is now seen through [facility] and [facility]. No upcoming appointments with us.    Rosalee Pop, [facility] Triage/Med Refill 9/13/2020    Requested Prescriptions   Pending Prescriptions Disp Refills     metFORMIN (GLUCOPHAGE) 500 MG tablet 270 tablet 3       Metformin Refill Protocol Failed - 9/13/2020  4:31 PM        Failed - LFT or AST or ALT in last 12 months     Albumin   Date Value Ref Range Status   02/07/2019 3.5 3.5 - 5.0 g/dL Final     Bilirubin, Total   Date Value Ref Range Status   02/07/2019 0.5 0.0 - 1.0 mg/dL Final     Alkaline Phosphatase   Date Value Ref Range Status   02/07/2019 77 45 - 120 U/L Final     AST   Date Value Ref Range Status   02/07/2019 37 0 - 40 U/L Final     ALT   Date Value Ref Range Status   02/07/2019 66 (H) 0 - 45 U/L Final     Protein, Total   Date Value Ref Range Status   02/07/2019 7.1 6.0 - 8.0 g/dL Final                Failed - GFR or Serum Creatinine in last 6 months     GFR MDRD Non Af Amer   Date Value Ref Range Status   03/08/2019 >60 >60 mL/min/1.73m2 Final     GFR MDRD Af Amer   Date Value Ref Range Status   03/08/2019 >60 >60 mL/min/1.73m2 Final             Failed - Visit with PCP or prescribing provider visit in last 6 months or next 3 months     Last office visit with prescriber/PCP: Visit date not found OR same dept: Visit date not found OR same specialty: 3/13/2019 Dillan Kim MD Last physical: Visit date not found Last MTM visit:  Visit date not found         Next appt within 3 mo: Visit date not found  Next physical within 3 mo: Visit date not found  Prescriber OR PCP: Dillan Kim MD  Last diagnosis associated with med order: 1. Type 2 diabetes mellitus treated without insulin (H)  - metFORMIN (GLUCOPHAGE) 500 MG tablet  Dispense: 270 tablet; Refill: 3     If protocol passes may refill for 12 months if within 3 months of last provider visit (or a total of 15 months).           Failed - A1C in last 6 months     Hemoglobin A1c   Date Value Ref Range Status   02/07/2019 7.1 (H) 4.2 - 6.1 % Final               Failed - Microalbumin in last year      Microalbumin, Random Urine   Date Value Ref Range Status   03/13/2019 1.27 0.00 - 1.99 mg/dL Final                  Passed - Blood pressure in last 12 months     BP Readings from Last 1 Encounters:   03/04/20 132/68

## 2021-06-12 NOTE — PROGRESS NOTES
Assessment:      Healthy male exam.    Encounter Diagnoses   Name Primary?     Esophageal reflux Yes     Hyperlipidemia      Essential hypertension      Obesity      Psoriasis      Rheumatoid arthritis      Health care maintenance      CIS (carcinoma in situ of bladder)          Plan:       All questions answered.    Patient Instructions   Fasting labs    Schedule colonoscopy    The following high BMI interventions were performed this visit: encouragement to exercise and dietary management education, guidance, and counseling    Prostate check Feb 2017 at which time PSA was checked and normal    Quit Cigars       Subjective:      Koko Ronquillo is a 71 y.o. male who presents for an annual exam. The patient reports that there is not domestic violence in his life.     Healthy Habits:   Regular Exercise: No  Sunscreen Use: No  Healthy Diet: No  Dental Visits Regularly: N/A  Seat Belt: No  Sexually active: Yes  Monthly Self Testicular Exams:  Yes  Hemoccults: N/A  Flex Sig: N/A  Colonoscopy: No; will schedule  Lipid Profile: Yes  Glucose Screen: Yes  Prevention of Osteoporosis: Yes  Last Dexa: N/A  Guns at Home:  Yes  Guns Safety Locks:  Yes      Immunization History   Administered Date(s) Administered     Influenza high dose, seasonal 01/05/2016, 10/11/2016     Influenza, inj, historic 10/30/2007, 10/15/2008     Influenza, seasonal,quad inj 6-35 mos 12/07/2009, 09/14/2011, 12/14/2012, 11/11/2014     Pneumo Conj 13-V (2010&after) 10/11/2016     Pneumo Polysac 23-V 09/14/2011     Td, historic 1945     Tdap 02/18/2011     Immunization status: up to date and documented.    No exam data present    Current Outpatient Prescriptions   Medication Sig Dispense Refill     betamethasone dipropionate (DIPROLENE) 0.05 % ointment APPLY TO AFFECTED AREA SPARINGLY TWICE DAILY 45 g 0     betamethasone dipropionate 0.05 % lotion        ciclopirox 0.77 % gel        diltiazem (CARTIA XT) 120 MG 24 hr capsule TAKE 1 CAPSULE(120 MG)  "BY MOUTH DAILY 30 capsule 0     folic acid (FOLVITE) 1 MG tablet Take 5 mg by mouth daily.   4     HYDROcodone-acetaminophen 5-325 mg per tablet Take 1 tablet by mouth bedtime. 30 tablet 0     hydrocortisone 2.5 % cream        ibuprofen (ADVIL,MOTRIN) 800 MG tablet TAKE 1 TABLET BY MOUTH AS NEEDED 30 tablet 0     ketoconazole (NIZORAL) 2 % cream        losartan-hydrochlorothiazide (HYZAAR) 100-12.5 mg per tablet Take 1 tablet by mouth every evening.       methotrexate 2.5 MG tablet Take 10 mg by mouth 2 (two) times a week. On Tuesdays and Wednesdays       metoprolol succinate (TOPROL XL) 100 MG 24 hr tablet Take 1 tablet (100 mg total) by mouth daily. 30 tablet 3     oxyCODONE-acetaminophen (ROXICET) 5-325 mg per tablet Take 1-2 tablets by mouth every 4 (four) hours as needed for pain. 30 tablet 0     pravastatin (PRAVACHOL) 20 MG tablet TAKE 1 TABLET(20 MG) BY MOUTH BEDTIME 30 tablet 2     tadalafil (CIALIS) 20 MG tablet Take as directed.       tamsulosin (FLOMAX) 0.4 mg Cp24 Take 1 capsule (0.4 mg total) by mouth 2 (two) times a day. 180 capsule 3     docoshexanoic acid-eicosapent 500 mg (FISH OIL) 500-100 mg cap capsule Take 2,000 mg by mouth daily.       ILEVRO 0.3 % DrpS   1     syringe with needle, disp, 1 mL 27 x 1/2\" Syrg Use 1 Syringe As Directed once a week. 100 Syringe 1     No current facility-administered medications for this visit.      Past Medical History:   Diagnosis Date     Anxiety      Atrial fibrillation      HLD (hyperlipidemia)      HTN (hypertension)      Psoriasis      RA (rheumatoid arthritis)      Sleep apnea     does not use CPAP     Past Surgical History:   Procedure Laterality Date     ABDOMINAL AORTIC ANEURYSM REPAIR  1995     aortic bypass       HERNIA REPAIR      with nes     nasal cauterization       TRANSURETHRAL RESECTION OF BLADDER TUMOR N/A 3/1/2017    Procedure: CYSTOSCOPY BLADDER BIOPSY AND FULGURATION;  Surgeon: Kostas Smith MD;  Location: [facility] OR;  " "Service:      Review of patient's allergies indicates no known allergies.  Family History   Problem Relation Age of Onset     Acute Myocardial Infarction Father 58     Social History     Social History     Marital status:      Spouse name: N/A     Number of children: N/A     Years of education: N/A     Occupational History     Not on file.     Social History Main Topics     Smoking status: Former Smoker     Quit date: 2/28/2007     Smokeless tobacco: Never Used     Alcohol use Yes      Comment: occasional     Drug use: No     Sexual activity: Not on file     Other Topics Concern     Not on file     Social History Narrative       Review of Systems  General:  Denies problem  Eyes: Denies problem  Ears/Nose/Throat: some hearing loss  Cardiovascular: Denies problem  Respiratory:  gets winded going up two sets of stairs  Gastrointestinal:  Denies problem  Genitourinary: Denies problem  Musculoskeletal:  Denies problem  Skin: Denies problem  Neurologic: memory loss  Psychiatric: Denies problem  Endocrine: Denies problem  Heme/Lymphatic: Denies problem   Allergic/Immunologic: Denies problem        Objective:     Vitals:    08/17/17 0747   BP: 130/62   Pulse: (!) 50   Resp: 20   Temp: 98.3  F (36.8  C)   TempSrc: Oral   Weight: (!) 239 lb 9.6 oz (108.7 kg)   Height: 5' 9\" (1.753 m)     Body mass index is 35.38 kg/(m^2).    Physical  General Appearance: Alert, cooperative, no distress, appears stated age  Head: Normocephalic, without obvious abnormality, atraumatic  Eyes: PERRL, conjunctiva/corneas clear, EOM's intact  Ears: Normal TM's and external ear canals, both ears  Nose: Nares normal, septum midline,mucosa normal, no drainage  Throat: Lips, mucosa, and tongue normal; teeth and gums normal  Neck: Supple, symmetrical, trachea midline, no adenopathy;  thyroid: not enlarged, symmetric, no tenderness/mass/nodules; no carotid bruit or JVD  Back: Symmetric, no curvature, ROM normal, no CVA tenderness  Lungs: Clear to " auscultation bilaterally, respirations unlabored  Heart: Regular rate and rhythm, S1 and S2 normal, no murmur, rub, or gallop,  Abdomen: Soft, non-tender, bowel sounds active all four quadrants,  no masses, no organomegaly  Genitourinary: not examined  Musculoskeletal: Normal range of motion. No joint swelling or deformity.   Extremities: Extremities normal, atraumatic, no cyanosis or edema  Skin: Skin color, texture, turgor normal, no rashes or lesions  Lymph nodes: Cervical, supraclavicular, and axillary nodes normal  Neurologic: He is alert. He has normal reflexes.   Psychiatric: He has a normal mood and affect.

## 2021-06-15 NOTE — TELEPHONE ENCOUNTER
----- Message from WILIAM Andujar sent at 2/16/2021  1:43 PM CST -----  Regarding: [facility] PATIENT  Patient has already contacted their pharmacy. The medication or refill issue is below:      Primary Cardiologist: ELIANE    Medication: losartan (COZAAR) 100 MG tablet     Issue / Concern: needs refill    Preferred Pharmacy/City: losartan (COZAAR) 100 MG tablet     Best Phone Number for Patient: [phone]     Additional Info:        Refill granted. Follow-up scheduled in March. -kcl

## 2021-06-16 PROBLEM — I71.40 ABDOMINAL AORTIC ANEURYSM (AAA) WITHOUT RUPTURE (H): Status: ACTIVE | Noted: 2019-02-12

## 2021-06-16 PROBLEM — I25.10 CORONARY ARTERY DISEASE DUE TO LIPID RICH PLAQUE: Status: ACTIVE | Noted: 2019-02-09

## 2021-06-16 PROBLEM — G47.33 OSA (OBSTRUCTIVE SLEEP APNEA): Status: ACTIVE | Noted: 2019-04-29

## 2021-06-16 PROBLEM — R07.9 ACUTE CHEST PAIN: Status: ACTIVE | Noted: 2019-02-07

## 2021-06-16 PROBLEM — E11.9 TYPE 2 DIABETES MELLITUS TREATED WITHOUT INSULIN (H): Status: ACTIVE | Noted: 2019-02-07

## 2021-06-16 PROBLEM — I48.91 ATRIAL FIBRILLATION (H): Status: ACTIVE | Noted: 2019-04-29

## 2021-06-16 PROBLEM — R79.89 TROPONIN LEVEL ELEVATED: Status: ACTIVE | Noted: 2019-02-07

## 2021-06-16 PROBLEM — I25.83 CORONARY ARTERY DISEASE DUE TO LIPID RICH PLAQUE: Status: ACTIVE | Noted: 2019-02-09

## 2021-06-16 NOTE — TELEPHONE ENCOUNTER

## 2021-06-16 NOTE — PATIENT INSTRUCTIONS - HE
Mr Koko Ronquillo,  I enjoyed visiting with you again today.  I am glad to hear you are doing well.  Per our conversation given the abnormal heart beat I will check the heart monitor.  In addition if blood pressures go up will increase meds.  I will plan on seeing you 1 year or sooner if needed.  Ajay Argueta

## 2021-06-16 NOTE — TELEPHONE ENCOUNTER
Telephone Encounter by Audrey Sepulveda RN at 2/25/2019  2:15 PM     Author: Audrey Sepulveda RN Service: -- Author Type: Registered Nurse    Filed: 2/25/2019  2:27 PM Encounter Date: 2/25/2019 Status: Signed    : Audrey Sepulveda RN (Registered Nurse)       Edu Schmitz NP Forgosh, Leslie B, MD   Cc: Audrey Sepulveda, RN             Thank you Dr Argueta!     Audrey,   Could you please follow up on patient with rec from Dr. Argueta. I increased his HCTZ today from 12.5 mg to 25 mg. Please ask him to monitor his BP and HR at home and call us with readings in a week.  I am happy to see him back in couple weeks to up titrate his betablocker.   Thank you   Edu    Previous Messages      ----- Message -----   From: Erica Argueta MD   Sent: 2/22/2019  12:47 PM   To: Edu Schmitz NP     Thank you for your note.   Agree, given the significant size of his aorta would not pursue cardiac rehab.   I would favor increases in his antihypertensives, specifically beta-blocker.   Ajay Argueta   ----- Message -----   From: Edu Schmitz NP   Sent: 2/22/2019  11:52 AM   To: MD Dr. Ellie Basurto,   Saw Mr. Ronquillo for post PCI f/u.His BP is mildly elevated, mild ankle edema, wt gain and some shortness of breath on exertion-Declined BNP check today. I  increased his HCTZ and BMP check in 10-14 days. He is seeing you in about 6 weeks.    With increase Abdominal Aneurysm size, asking if he should hold off cardiac rehab? Dr. Kim referred him see vascular surgery at [facility].   Thanks   CY             Pt was informed that he should hold off on any cardiac rehab per recommendations from [facility]. He will expect a call on Friday regarding his week of blood pressure readings. Update CY at that time for further titration. Pt was informed by his insurance company that [place] may not be covered for him to see Vascular Surgery there. He will let us know if [place] is not covered in the event of requiring  a consult within [facility].     KARYN Sornesen pt will be called Friday regarding updated BP's. He is waiting to hear back from insurance to see if [place] is in network for him to see Vascular surgery down there for the abdominal aortic aneurysm.   Thanks,.  Mal

## 2021-06-16 NOTE — TELEPHONE ENCOUNTER
[facility] from [facility] requesting medical clearance from Dr. Argueta for patient to participate in their chronic spine pain program. The program is intense with twice weekly therapy that last an hour and is on-going for 9-12 weeks. The exercising is progressive to the point of fatigue. If appropriate- request [facility] sign off. Will forward. -[facility]      Dr. Argueta,  For Monday-See above- form is in your folder if you are OK with patient completing this intensive strength building program? Description will be on the form.  Thanks!  Mal

## 2021-06-16 NOTE — TELEPHONE ENCOUNTER
Telephone Encounter by Debbie Payne at 3/13/2019  8:49 AM     Author: Debbie Payne Service: -- Author Type: --    Filed: 3/13/2019  8:52 AM Encounter Date: 3/12/2019 Status: Signed    : Debbie Payne       Per insurance, medication does not require a PA because it is on formulary. Called pharmacy and left detailed message stating that if they were having further processing issues, they would need to call the pharmacy help desk for assistance.

## 2021-06-16 NOTE — PROGRESS NOTES
DIAGNOSIS:  1. Cough  XR Chest 2 Views   2. Colon cancer screening  Cologuard       PLAN:  Patient Instructions   Robitussin DM OTC for cough as needed    Will call with xray report    Follow up if: fever, increasing cough or cough > 2 weeks, or if increasing shortness of breath.            HPI: COUGH FOR 4 DAYS, SOME PHLEGM THIS AM.  COUGH IS WORSE AT NIGHT.  ? MILD FEVER.  NO CHILLS.   NO HEADACHE OR BODY ACHES.  DID HAVE THE FLU SHOT.  SOME CHRONIC SOB IS NO DIFFERENT THAN NORMAL AND HAPPENS WITH A COUPLE FLIGHTS OF STEPS.         Current Outpatient Prescriptions on File Prior to Visit   Medication Sig Dispense Refill     betamethasone dipropionate (DIPROLENE) 0.05 % ointment APPLY TO AFFECTED AREA SPARINGLY TWICE DAILY 45 g 0     betamethasone dipropionate 0.05 % lotion        CARTIA  mg 24 hr capsule TAKE 1 CAPSULE BY MOUTH DAILY. 30 capsule 11     folic acid (FOLVITE) 1 MG tablet Take 5 mg by mouth daily.   4     HYDROcodone-acetaminophen 5-325 mg per tablet Take 1 tablet by mouth bedtime. 30 tablet 0     hydrocortisone 2.5 % cream        ibuprofen (ADVIL,MOTRIN) 800 MG tablet TAKE 1 TABLET BY MOUTH AS NEEDED 30 tablet 0     ketoconazole (NIZORAL) 2 % cream        losartan-hydrochlorothiazide (HYZAAR) 100-12.5 mg per tablet TAKE 1 TABLET BY MOUTH EVERY DAY 90 tablet 3     methotrexate 2.5 MG tablet Take 10 mg by mouth 2 (two) times a week. On Tuesdays and Wednesdays       metoprolol succinate (TOPROL-XL) 100 MG 24 hr tablet TAKE 1 TABLET(100 MG) BY MOUTH DAILY 30 tablet 8     oxyCODONE-acetaminophen (ROXICET) 5-325 mg per tablet Take 1-2 tablets by mouth every 4 (four) hours as needed for pain. 30 tablet 0     pravastatin (PRAVACHOL) 20 MG tablet TAKE 1 TABLET(20 MG) BY MOUTH AT BEDTIME 90 tablet 2     tadalafil (CIALIS) 20 MG tablet Take as directed.       tamsulosin (FLOMAX) 0.4 mg Cp24 TAKE 1 CAPSULE(0.4 MG) BY MOUTH TWICE DAILY 180 capsule 2     ciclopirox 0.77 % gel        docoshexanoic  "acid-eicosapent 500 mg (FISH OIL) 500-100 mg cap capsule Take 2,000 mg by mouth daily.       ILEVRO 0.3 % DrpS   1     losartan-hydrochlorothiazide (HYZAAR) 100-12.5 mg per tablet Take 1 tablet by mouth every evening.       syringe with needle, disp, 1 mL 27 x 1/2\" Syrg Use 1 Syringe As Directed once a week. 100 Syringe 1     No current facility-administered medications on file prior to visit.        Pmh: reviewed  Psh: reviewed  Allergy:  reviewed      EXAM:    /72  Pulse 70  Temp 98.6  F (37  C) (Oral)   Resp 16  Wt (!) 249 lb (112.9 kg)  BMI 36.77 kg/m2  GEN:   ALERT, NAD, ORIENTED TIMES THREE  HEENT: TMS NL, PERRL, THR CLEAR  NECK: SUPPLE WITHOUT ADENOPATHY OR THYROMEGALY  LUNGS: CTA  COR: RRR WITHOUT MURMUR  EXT: WITHOUT EDEMA OR SWELLING    No results found for this or any previous visit (from the past 168 hour(s)).     CXR:  NO INFILTRATE SEEN (on my clinic read)  "

## 2021-06-17 ENCOUNTER — COMMUNICATION - HEALTHEAST (OUTPATIENT)
Dept: CARDIOLOGY | Facility: CLINIC | Age: 76
End: 2021-06-17

## 2021-06-17 DIAGNOSIS — I25.10 CAD (CORONARY ARTERY DISEASE): ICD-10-CM

## 2021-06-17 RX ORDER — CLOPIDOGREL BISULFATE 75 MG/1
75 TABLET ORAL DAILY
Qty: 90 TABLET | Refills: 1 | Status: SHIPPED | OUTPATIENT
Start: 2021-06-17 | End: 2022-01-01

## 2021-06-17 NOTE — TELEPHONE ENCOUNTER
Received signed form from Dr. Argueta. Faxed back to [facility]. Form sent to [facility] to be scanned into pt's chart. -kcl

## 2021-06-17 NOTE — PATIENT INSTRUCTIONS - HE
Patient Instructions by Edu Schmitz NP at 2/22/2019  9:50 AM     Author: Edu Schmitz NP Service: -- Author Type: Nurse Practitioner    Filed: 2/22/2019 11:04 AM Encounter Date: 2/22/2019 Status: Addendum    : Edu Schmitz NP (Nurse Practitioner)    Related Notes: Original Note by Edu Schmitz NP (Nurse Practitioner) filed at 2/22/2019 11:00 AM       Koko Ronquillo,    It was a pleasure to see you today at the [facility].     My recommendations after this visit include:    - I stopped Hyzaar (Losartan-Hydrochlorothiazide 100-12.5 mg) and started your on Losartan 100 mg daily and increased Hydrochlorothiazide from 12.5 mg to 25 mg daily for your blood pressure and ankle swelling.     - Please get your kidney function test (blood work) in 10-14 days    - Do not take Nitro with Cialis to prevent severe drug interaction    - Please seek medical attention if recurrent epigastric pain/heart burn or new onset of chest pain/tightness/pressure/ or worsening shortness of breath    - Follow up with Dr. Argueta in 4-6 weeks    - Please call Yolanda Randolph RN on [phone], if you have any questions or concerns    Edu Schmitz CNP      Medication     o Take all your medications as prescribed  o Do not stop any medications without talking with a healthcare provider    Exercise      o Physical activity is important for overall health  o Set a goal of 150 minutes of exercise each week  o For example, 30 minutes of exercise 5 days each week.    o These 30 minutes can be broken into shorter periods of 15 minutes twice daily or 10 minutes three times daily  o Start any exercise program slowly and work towards the goal of 150 minutes each week  o For example, you may start with 10 minutes and plan to add a few minutes each week as you get stronger   o Examples of exercise include walking, swimming, or biking  o Remember to stretch and stay hydrated with exercise    Diet     o A heart healthy diet  includes:  o A variety of fruits and vegetables  o Whole grains  o Low-fat dairy (fat-free, 1% fat, and low-fat)  o Lean meats and poultry without skin   o Fish (eat fish 2 times each week)  o Nuts  o Limit saturated fat to about 13 grams each day (based on a 2000 calorie diet)  o Limit red meat  o Limit sugars (sweets and sugary beverages)  o Limit your portion sizes  o Do not add salt to your food when cooking or at the table  o Limit alcohol intake (no more than 1 drink each day for women or 2 drinks each day for men)    Weight Loss     o Work on losing weight with diet and exercise  o You BMI (body mass index) should be between 18.5-24.9  o This is a calculation of your weight and height  o Please ask your healthcare provider for your BMI    Manage Other Chronic Health Conditions     o Control cholesterol  o Eat a diet low in saturated fat  o Exercise   o Take a statin medication as prescribed  o Manage blood pressure  o Eat a diet low in sodium  o Exercise  o Reduce stress  o Lose weight   o Take blood pressure medications as prescribed  o Control blood sugars if diabetic  o Monitor sugars and carbohydrates in your diet  o Lose weight   o Take diabetes medications as prescribed  o Follow-up with your primary care provider to make sure your blood sugars are well controlled    Stress Reduction     o Find time each day to relax  o Reading, listening to music, yoga, meditation, exercise, spending time with friends and family, volunteering   o Get 6-8 hours of sleep each night    Smoking Cessation     o Smoking causes numerous health problems including coronary artery disease  o It is never too late to quit  o Set realistic goals for quitting  o Decrease the number of cigarettes used each week  o Use nicotine gum or patches to help you quit    Information from the American Heart Association.  Please visit their website at www.heart.org    Patient Education     Eating Heart-Healthy Foods  Eating has a big impact on  your heart health. In fact, eating healthier can improve several of your heart risks at once. For instance, it helps you manage weight, cholesterol, and blood pressure. Here are ideas to help you make heart-healthy changes without giving up all the foods and flavors you love.  Getting started    Talk with your healthcare provider about eating plans, such as the DASH or Mediterranean diet. You may also be referred to a dietitian.    Change a few things at a time. Give yourself time to get used to a few eating changes before adding more.    Work to create a tasty, healthy eating plan that you can stick to for the rest of your life.    Goals for healthy eating  Below are some tips to improve your eating habits:    Limit saturated fats and trans fats. Saturated fats raise your levels of cholesterol, so keep these fats to a minimum. They are found in foods such as fatty meats, whole milk, cheese, and palm and coconut oils. Avoid trans fats because they lower good cholesterol as well as raise bad cholesterol. Trans fats are most often found in processed foods.    Reduce sodium (salt) intake. Eating too much salt may increase your blood pressure. Limit your sodium intake to 2,300 milligrams (mg) per day (the amount in 1 teaspoon of salt), or less if your healthcare provider recommends it. Dining out less often and eating fewer processed foods are two great ways to decrease the amount of salt you consume.    Managing calories. A calorie is a unit of energy. Your body burns calories for fuel, but if you eat more calories than your body burns, the extras are stored as fat. Your healthcare provider can help you create a diet plan to manage your calories. This will likely include eating healthier foods as well as exercising regularly. To help you track your progress, keep a diary to record what you eat and how often you exercise.  Choose the right foods  Aim to make these foods staples of your diet. If you have diabetes, you  may have different recommendations than what is listed here:    Fruits and vegetables provide plenty of nutrients without a lot of calories. At meals, fill half your plate with these foods. Split the other half of your plate between whole grains and lean protein.    Whole grains are high in fiber and rich in vitamins and nutrients. Good choices include whole-wheat bread, pasta, and brown rice.    Lean proteins give you nutrition with less fat. Good choices include fish, skinless chicken, and beans.    Low-fat or nonfat dairy provides nutrients without a lot of fat. Try low-fat or nonfat milk, cheese, or yogurt.    Healthy fats can be good for you in small amounts. These are unsaturated fats, such as olive oil, nuts, and fish. Try to have at least 2 servings per week of fatty fish, such as salmon, sardines, mackerel, rainbow trout, and albacore tuna. These contain omega-3 fatty acids, which are good for your heart. Flaxseed is another source of a heart-healthy fat.  More on heart-healthy eating  Read food labels  Healthy eating starts at the grocery store. Be sure to pay attention to food labels on packaged foods. Look for products that are high in fiber and protein, and low in saturated fat, cholesterol, and sodium. Avoid products that contain trans fat. And pay close attention to serving size. For instance, if you plan to eat two servings, double all the numbers on the label.  Prepare food right  A key part of healthy cooking is cutting down on added fat and salt. Look on the internet for lower-fat, lower-sodium recipes. Also, try these tips:    Remove fat from meat and skin from poultry before cooking.    Skim fat from the surface of soups and sauces.    Broil, boil, bake, steam, grill, and microwave food without added fats.    Choose ingredients that spice up your food without adding calories, fat, or sodium. Try these items: horseradish, hot sauce, lemon, mustard, nonfat salad dressings, and vinegar. For  salt-free herbs and spices, try basil, cilantro, cinnamon, pepper, and rosemary.  Date Last Reviewed: 10/1/2017    6696-0142 [employer]. [address] [place] [address], [place] [address]. All rights reserved. This information is not intended as a substitute for professional medical care. Always follow your healthcare professional's instructions.

## 2021-06-18 NOTE — LETTER
Letter by Marc Alcala MD at      Author: Marc Alcala MD Service: -- Author Type: --    Filed:  Encounter Date: 3/8/2019 Status: (Other)       Dillan Kim MD  [address]  [facility] [address]                                  March 8, 2019    Patient: Koko Ronquillo   MR Number: [id number]   YOB: 1945   Date of Visit: 3/8/2019     Dear Dr. Judy MD:    Thank you for referring Koko Ronquillo to me for evaluation. Below are the relevant portions of my assessment and plan of care.    If you have questions, please do not hesitate to call me. I look forward to following Kook along with you.    Sincerely,        Marc Alcala MD          CC  Koko Ronquillo

## 2021-06-18 NOTE — PROGRESS NOTES
DIAGNOSIS:  1. Cough  XR Chest 2 Views   2. Lymphadenitis, acute  amoxicillin-clavulanate (AUGMENTIN) 875-125 mg per tablet       PLAN:  Patient Instructions   Augmentin 875 mg twice daily for a week    Then eat an Activia yogurt at least once daily while on the antibiotic    Chest xray sent    Follow up if not continuing to improve            HPI:  Cough for 1 1/2 weeks, some yellow phlegm and now clear, assoc SOB,  Had a fever of 99 the other day, no chills.  No assoc myalgias.   Assoc little energy.     Lower back discomfort    Now with 3 days of some pain and swelling below his left ear.  Then the swelling resolved but a puffiness developed under his chin.    Non-smoker. Quit about 12 years ago.        Current Outpatient Prescriptions on File Prior to Visit   Medication Sig Dispense Refill     betamethasone dipropionate (DIPROLENE) 0.05 % ointment APPLY TO AFFECTED AREA SPARINGLY TWICE DAILY 45 g 0     betamethasone dipropionate 0.05 % lotion        CARTIA  mg 24 hr capsule TAKE 1 CAPSULE BY MOUTH DAILY. 30 capsule 11     ciclopirox 0.77 % gel        docoshexanoic acid-eicosapent 500 mg (FISH OIL) 500-100 mg cap capsule Take 2,000 mg by mouth daily.       folic acid (FOLVITE) 1 MG tablet Take 5 mg by mouth daily.   4     HYDROcodone-acetaminophen 5-325 mg per tablet Take 1 tablet by mouth bedtime. 30 tablet 0     hydrocortisone 2.5 % cream        ibuprofen (ADVIL,MOTRIN) 800 MG tablet TAKE 1 TABLET BY MOUTH AS NEEDED 30 tablet 0     ILEVRO 0.3 % DrpS   1     ketoconazole (NIZORAL) 2 % cream        losartan-hydrochlorothiazide (HYZAAR) 100-12.5 mg per tablet Take 1 tablet by mouth every evening.       losartan-hydrochlorothiazide (HYZAAR) 100-12.5 mg per tablet TAKE 1 TABLET BY MOUTH EVERY DAY 90 tablet 3     methotrexate 2.5 MG tablet Take 10 mg by mouth 2 (two) times a week. On Tuesdays and Wednesdays       metoprolol succinate (TOPROL-XL) 100 MG 24 hr tablet TAKE 1 TABLET(100 MG) BY MOUTH DAILY 30 tablet  "8     oxyCODONE-acetaminophen (ROXICET) 5-325 mg per tablet Take 1-2 tablets by mouth every 4 (four) hours as needed for pain. 30 tablet 0     pravastatin (PRAVACHOL) 20 MG tablet TAKE 1 TABLET(20 MG) BY MOUTH AT BEDTIME 90 tablet 2     syringe with needle, disp, 1 mL 27 x 1/2\" Syrg Use 1 Syringe As Directed once a week. 100 Syringe 1     tadalafil (CIALIS) 20 MG tablet Take as directed.       tamsulosin (FLOMAX) 0.4 mg Cp24 TAKE 1 CAPSULE(0.4 MG) BY MOUTH TWICE DAILY 180 capsule 2     No current facility-administered medications on file prior to visit.        Pmh: reviewed  Psh: reviewed  Allergy:  reviewed      EXAM:    /60 (Patient Site: Left Arm, Patient Position: Sitting, Cuff Size: Adult Large)  Pulse 60  Temp 98.1  F (36.7  C) (Oral)   Resp 26  Wt (!) 249 lb 12.8 oz (113.3 kg)  SpO2 95%  BMI 36.89 kg/m2  GEN:   ALERT, NAD, ORIENTED TIMES THREE  HEENT: TMS NL, PERRL, THR CLEAR  (slightly tx, puffy and red anterior to the left ear)  NECK: SUPPLE WITHOUT ADENOPATHY OR THYROMEGALY  LUNGS: CTA  COR: RRR WITHOUT MURMUR  SKIN: SLIGHTLY RED ANTERIOR TO THE LEFT EAR  EXT: WITHOUT EDEMA OR SWELLING    No results found for this or any previous visit (from the past 168 hour(s)).     CXR:  NO ACUTE INFILTRATE  "

## 2021-06-18 NOTE — LETTER
Letter by Marc Alcala MD at      Author: Marc Alcala MD Service: -- Author Type: --    Filed:  Encounter Date: 3/8/2019 Status: (Other)       [facility] - Prior Authorization / Exception Form    Prescriber: Please complete Patient, Provider and Requested Therapy sections.  For questions please call [phone] or [phone]. Incomplete submissions will be returned and may delay review.  FAX to [phone] or [phone]    P  a  t  i  e  n  t Name  Koko Ronquillo    Date of Birth 1945 [employer] Insurance ID # [id number]    Patient Address   [address] [place] [id number]   P  r  o  v  i  d  e  r Today's Date: 3/8/2019   [facility] VASCULAR [address], [address]  [facility] [address] [phone]   Fax: [phone]    Provider Name (FIRST and LAST)  Marc Alcala MD     Specialty: Vascular Surgery.      :  Vanna Nava     Reason for Prior Authorization:  Pt was referred to our Vascular Surgeon for evaluation and treatment of a distal descending thoracic aorta. 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 Upon review of the CT scan and patients chart treatment for the patient recommended would be a Fenestrated Branched Aortic Stent Graft. Pt could potentially also have an Open Repair but due to his comorbidities Mr Ronquillo is at to high a risk to survive this kind of surgery therefore recommendation of the stent graft. Unfortunately only two facilities in [place] perform this procedure which is [facility] and [facility]. Due to this I am recommending and requesting pt be allowed to be evaluated and treated at one of these two facilities.             Federal Tax ID (only needed for medications given in-clinic) Recommended by Consultant?   yes       [employer] Preferred Drug List (Formulary), Prior Approval  and Medical Coverage Criteria are available at [email or web address]  [employer] Review Determination            d  APPROVED  Please notify patient and pharmacy.        d   DENIED per Medical Director review  Denial letter has been mailed to the member.  See attached copy for criteria and appeal rights.     Note from [employer]                                                Group / Pkg / FI or SI / PCR Plan                 For Internal Use                         PCSA / Date                                   RPh / Date      Open        Closed         NP         EAN         Pt Alert         TE     Confidentiality Notice: The information in this facsimile is confidential and intended for the use of the fax number shown above.  If you are neither the intended   recipient nor the employer or agent responsible for delivering this message to the intended recipient, you are hereby notified that any disclosure, copying,   distribution or taking of any action in reliance of the contents of this communication is strictly prohibited.  If you have receive this facsimile in error, please   immediately notify us by telephone at [phone] or [phone] to arrange for its return.  Thank you for your assistance.           Last updated 01/07/08

## 2021-06-19 NOTE — LETTER
Letter by Dillan Kim MD at      Author: Dillan Kim MD Service: -- Author Type: --    Filed:  Encounter Date: 3/13/2019 Status: (Other)       Koko Ronquillo  [address] [facility] [address]             March 13, 2019         Dear Mr. Ronquillo,    Below are the results from your recent visit:    Resulted Orders   Microalbumin, Random Urine   Result Value Ref Range    Microalbumin, Random Urine 1.27 0.00 - 1.99 mg/dL    Creatinine, Urine 97.2 mg/dL    Microalbumin/Creatinine Ratio Random Urine 13.1 <=19.9 mg/g    Narrative    Microalbumin, Random Urine  <2.0 mg/dL . . . . . . . . Normal  3.0-30.0 mg/dL . . . . . . Microalbuminuria  >30.0 mg/dL . . . . . .  . Clinical Proteinuria    Microalbumin/Creatinine Ratio, Random Urine  <20 mg/g . . . . .. . . . Normal   mg/g . . . . . . . Microalbuminuria  >300 mg/g . . . . . . . . Clinical Proteinuria         No significant urine protein seen    Please call with questions or contact us using [employer]    Sincerely,        Electronically signed by Dillan Kim MD

## 2021-06-20 ENCOUNTER — HEALTH MAINTENANCE LETTER (OUTPATIENT)
Age: 76
End: 2021-06-20

## 2021-06-20 NOTE — LETTER
Letter by Erica Argueta MD at      Author: Erica Argueta MD Service: -- Author Type: --    Filed:  Encounter Date: 2/28/2020 Status: (Other)         Koko Ronquillo  [address] [place] [address]     February 28, 2020     Dear Mr. Ronquillo,    Below are the results from your recent visit:    Resulted Orders   Lipid panel   Result Value Ref Range    Triglycerides 172 (H) <=149 mg/dL    Cholesterol 132 <=199 mg/dL    LDL Calculated 65 <=129 mg/dL    HDL Cholesterol 33 (L) >=40 mg/dL    Patient Fasting > 8hrs? Yes      The test results show that your current treatment is working and numbers are great.   Please call with questions or contact us using [employer].    Sincerely,        Electronically signed by Erica Argueta MD

## 2021-06-21 NOTE — LETTER
Letter by Erica Argueta MD at      Author: Erica Argueta MD Service: -- Author Type: --    Filed:  Encounter Date: 5/7/2021 Status: (Other)         Koko Ronquillo  [address] [facility] [place] [address]     May 7, 2021     Dear Mr. Ronquillo,    Below are the results from your recent visit:    Resulted Orders   CARMELITA Monitor Hook-Up    Narrative    Cardiac event monitor report    Ordering physician: Dr. Argueta.   Indication: Atrial fibrillation.    Very limited monitoring time for a prescribed event monitor of 21 days.   Only one rhythm strip available for review demonstrating sinus bradycardia   at a rate of 55 bpm.    No evidence of atrial fibrillation based on review of available data.     The test results show that there is no atrial fibrillation or abnormal heartbeat that would require me to put you on blood thinner.  I assume you wore the monitor for more than 1 day?  If so, no changes in medications, follow-up as we discussed.   Please call with questions or contact us using [employer].    Sincerely,        Electronically signed by Erica Argueta MD

## 2021-06-22 NOTE — PROGRESS NOTES
DIAGNOSIS:  1. Hypertension     2. Health care maintenance  Influenza High Dose, Seasonal 65+ yrs   3. Class 2 obesity without serious comorbidity with body mass index (BMI) of 36.0 to 36.9 in adult, unspecified obesity type     4. Hyperlipidemia, unspecified hyperlipidemia type  Lipid [place] FASTING    Comprehensive Metabolic Panel   5. Elevated fasting blood sugar  Glycosylated Hemoglobin A1c       PLAN:  Patient Instructions   High dose flu shot    Fasting labs    The following high BMI interventions were performed this visit: encouragement to exercise and dietary management education, guidance, and counseling     Change losartan/ hydrochlorothiazide to the MORNING    Nurse BP CHECK in one week    appt in 3 weeks    Daily 30 min walk  Avoid salt  Work on weight loss of 10 lbs          HPI:  Here for a medcheck and flu shot  Not getting a lot of walking in.  Doesn't salt food.      Current Outpatient Medications on File Prior to Visit   Medication Sig Dispense Refill     betamethasone dipropionate (DIPROLENE) 0.05 % ointment APPLY TO AFFECTED AREA SPARINGLY TWICE DAILY 45 g 0     ciclopirox 0.77 % gel        diltiazem (CARTIA XT) 120 MG 24 hr capsule Take 1 capsule (120 mg total) by mouth daily. 90 capsule 0     docoshexanoic acid-eicosapent 500 mg (FISH OIL) 500-100 mg cap capsule Take 2,000 mg by mouth daily.       folic acid (FOLVITE) 1 MG tablet Take 5 mg by mouth daily.   4     hydrocortisone 2.5 % cream        ibuprofen (ADVIL,MOTRIN) 800 MG tablet Take 1 tablet (800 mg total) by mouth as needed. 30 tablet 0     ketoconazole (NIZORAL) 2 % cream        losartan-hydrochlorothiazide (HYZAAR) 100-12.5 mg per tablet TAKE 1 TABLET BY MOUTH EVERY DAY 90 tablet 0     methotrexate 2.5 MG tablet Take 10 mg by mouth 2 (two) times a week. On Tuesdays and Wednesdays       metoprolol succinate (TOPROL-XL) 100 MG 24 hr tablet TAKE 1 TABLET(100 MG) BY MOUTH DAILY 30 tablet 6     pravastatin (PRAVACHOL) 20 MG tablet TAKE 1  "TABLET(20 MG) BY MOUTH AT BEDTIME 90 tablet 2     tadalafil (CIALIS) 20 MG tablet Take as directed.       tamsulosin (FLOMAX) 0.4 mg cap TAKE 1 CAPSULE(0.4 MG) BY MOUTH TWICE DAILY 180 capsule 0     [DISCONTINUED] HYDROcodone-acetaminophen 5-325 mg per tablet Take 1 tablet by mouth bedtime. 30 tablet 0     [DISCONTINUED] betamethasone dipropionate 0.05 % lotion        [DISCONTINUED] ILEVRO 0.3 % DrpS   1     [DISCONTINUED] losartan-hydrochlorothiazide (HYZAAR) 100-12.5 mg per tablet Take 1 tablet by mouth every evening.       [DISCONTINUED] oxyCODONE-acetaminophen (ROXICET) 5-325 mg per tablet Take 1-2 tablets by mouth every 4 (four) hours as needed for pain. 30 tablet 0     [DISCONTINUED] syringe with needle, disp, 1 mL 27 x 1/2\" Syrg Use 1 Syringe As Directed once a week. 100 Syringe 1     No current facility-administered medications on file prior to visit.        Pmh: reviewed  Psh: reviewed  Allergy:  reviewed      EXAM:    /87   Pulse (!) 55   Resp 18   Ht 5' 9\" (1.753 m)   Wt (!) 249 lb 9.6 oz (113.2 kg)   SpO2 95%   BMI 36.86 kg/m     Wt Readings from Last 3 Encounters:   11/28/18 (!) 249 lb 9.6 oz (113.2 kg)   05/15/18 (!) 249 lb 12.8 oz (113.3 kg)   03/06/18 (!) 249 lb (112.9 kg)      GEN:   ALERT, NAD, ORIENTED TIMES THREE  LUNGS: CTA  COR: RRR WITHOUT MURMUR  SKIN: NO RASH , ULCERS OR LESIONS NOTED  EXT: WITHOUT EDEMA OR SWELLING    No results found for this or any previous visit (from the past 168 hour(s)).       Results for orders placed or performed in visit on 08/17/17   Comprehensive Metabolic Panel   Result Value Ref Range    Sodium 139 136 - 145 mmol/L    Potassium 3.9 3.5 - 5.0 mmol/L    Chloride 105 98 - 107 mmol/L    CO2 23 22 - 31 mmol/L    Anion Gap, Calculation 11 5 - 18 mmol/L    Glucose 117 70 - 125 mg/dL    BUN 13 8 - 28 mg/dL    Creatinine 1.11 0.70 - 1.30 mg/dL    GFR MDRD Af Amer >60 >60 mL/min/1.73m2    GFR MDRD Non Af Amer >60 >60 mL/min/1.73m2    Bilirubin, Total 0.6 0.0 " - 1.0 mg/dL    Calcium 9.1 8.5 - 10.5 mg/dL    Protein, Total 6.9 6.0 - 8.0 g/dL    Albumin 3.6 3.5 - 5.0 g/dL    Alkaline Phosphatase 88 45 - 120 U/L    AST 60 (H) 0 - 40 U/L    ALT 82 (H) 0 - 45 U/L     No results found for: HGBA1C

## 2021-06-22 NOTE — PROGRESS NOTES
I met with Koko Ronquillo at the request of Dillan Kim MD to recheck his blood pressure.  Blood pressure medications on the MAR were reviewed with patient.    Patient has taken all medications as per usual regimen: Yes  Patient reports tolerating them without any issues or concerns: Yes    Vitals:    12/05/18 1033 12/05/18 1043   BP: 161/81 155/79   Patient Site: Left Arm Left Arm   Patient Position: Sitting Sitting   Cuff Size: Adult Large Adult Large   Pulse: (!) 58 (!) 57       After 5 minutes, the patient's blood pressure remained greater than or equal to 140/90.    Is the patient currently having any chest pain?  No  Does the patient currently have a headache?   No  Does the patient currently have any vision changes? No  Does the patient currently have any nausea? No  Does the patient currently have any abdominal pain? No    The previous encounter was reviewed.  The patient was discharged and the note will be sent to the provider for final review.

## 2021-06-22 NOTE — PROGRESS NOTES
DIAGNOSIS:  1. Hypertension         PLAN:  Patient Instructions   Continue taking your BP med in the morning.            HPI:  In for a BP recheck after changing his BP med to morning administration.          Current Outpatient Medications on File Prior to Visit   Medication Sig Dispense Refill     betamethasone dipropionate (DIPROLENE) 0.05 % ointment APPLY TO AFFECTED AREA SPARINGLY TWICE DAILY 45 g 0     CARTIA  mg 24 hr capsule TAKE 1 CAPSULE BY MOUTH EVERY DAY 90 capsule 3     ciclopirox 0.77 % gel        docoshexanoic acid-eicosapent 500 mg (FISH OIL) 500-100 mg cap capsule Take 2,000 mg by mouth daily.       folic acid (FOLVITE) 1 MG tablet Take 5 mg by mouth daily.   4     hydrocortisone 2.5 % cream        ibuprofen (ADVIL,MOTRIN) 800 MG tablet Take 1 tablet (800 mg total) by mouth as needed. 30 tablet 0     ketoconazole (NIZORAL) 2 % cream        losartan-hydrochlorothiazide (HYZAAR) 100-12.5 mg per tablet TAKE 1 TABLET BY MOUTH EVERY DAY 90 tablet 0     methotrexate 2.5 MG tablet Take 10 mg by mouth 2 (two) times a week. On Tuesdays and Wednesdays       metoprolol succinate (TOPROL-XL) 100 MG 24 hr tablet TAKE 1 TABLET(100 MG) BY MOUTH DAILY 30 tablet 6     pravastatin (PRAVACHOL) 20 MG tablet TAKE 1 TABLET(20 MG) BY MOUTH AT BEDTIME 90 tablet 2     tadalafil (CIALIS) 20 MG tablet Take as directed.       tamsulosin (FLOMAX) 0.4 mg cap TAKE 1 CAPSULE(0.4 MG) BY MOUTH TWICE DAILY 180 capsule 0     No current facility-administered medications on file prior to visit.        Pmh: reviewed  Psh: reviewed  Allergy:  reviewed      EXAM:    /73   Pulse 69   Resp 20   Wt (!) 248 lb (112.5 kg)   BMI 36.62 kg/m     Wt Readings from Last 3 Encounters:   12/19/18 (!) 248 lb (112.5 kg)   11/28/18 (!) 249 lb 9.6 oz (113.2 kg)   05/15/18 (!) 249 lb 12.8 oz (113.3 kg)      BP Readings from Last 3 Encounters:   12/19/18 129/73   12/05/18 155/79   11/28/18 162/87    GEN:   ALERT, NAD, ORIENTED TIMES  THREE  NECK: SUPPLE WITHOUT ADENOPATHY OR THYROMEGALY  LUNGS: CTA  COR: RRR WITHOUT MURMUR  SKIN: NO RASH , ULCERS OR LESIONS NOTED  EXT: WITHOUT EDEMA OR SWELLING    No results found for this or any previous visit (from the past 168 hour(s)).       Results for orders placed or performed during the hospital encounter of 04/20/16   Basic Metabolic Panel   Result Value Ref Range    Sodium 136 136 - 145 mmol/L    Potassium 4.1 3.5 - 5.0 mmol/L    Chloride 98 98 - 107 mmol/L    CO2 29 22 - 31 mmol/L    Anion Gap, Calculation 9 5 - 18 mmol/L    Glucose 82 70 - 125 mg/dL    Calcium 10.0 8.5 - 10.5 mg/dL    BUN 12 8 - 28 mg/dL    Creatinine 0.92 0.70 - 1.30 mg/dL    GFR MDRD Af Amer >60 >60 mL/min/1.73m2    GFR MDRD Non Af Amer >60 >60 mL/min/1.73m2

## 2021-06-23 NOTE — TELEPHONE ENCOUNTER
Refill Approved    Rx renewed per Medication Renewal Policy. Medication was last renewed on 10/10/18.    Darlyn Benz, [facility] Triage/Med Refill 2/4/2019     Requested Prescriptions   Pending Prescriptions Disp Refills     tamsulosin (FLOMAX) 0.4 mg cap [Pharmacy Med Name: TAMSULOSIN 0.4MG CAPSULES] 180 capsule 0     Sig: TAKE 1 CAPSULE(0.4 MG) BY MOUTH TWICE DAILY    Alfuzosin/Tamsulosin/Silodosin Refill Protocol  Passed - 2/1/2019  9:02 AM       Passed - PCP or prescribing provider visit in past 12 months      Last office visit with prescriber/PCP: 12/19/2018 Dillan Kim MD OR same dept: 12/19/2018 Dillan Kim MD OR same specialty: 12/19/2018 Dillan Kim MD  Last physical: 8/17/2017 Last MTM visit: Visit date not found   Next visit within 3 mo: Visit date not found  Next physical within 3 mo: Visit date not found  Prescriber OR PCP: Dillan Kim MD  Last diagnosis associated with med order: 1. BPH (benign prostatic hyperplasia)  - tamsulosin (FLOMAX) 0.4 mg cap [Pharmacy Med Name: TAMSULOSIN 0.4MG CAPSULES]; TAKE 1 CAPSULE(0.4 MG) BY MOUTH TWICE DAILY  Dispense: 180 capsule; Refill: 0    If protocol passes may refill for 12 months if within 3 months of last provider visit (or a total of 15 months).

## 2021-06-23 NOTE — TELEPHONE ENCOUNTER
Refill Approved    Rx renewed per Medication Renewal Policy. Medication was last renewed on 10/10/18.    Ov: 12/19/18    Estela Newton, [facility] Triage/Med Refill 1/25/2019     Requested Prescriptions   Pending Prescriptions Disp Refills     losartan-hydrochlorothiazide (HYZAAR) 100-12.5 mg per tablet [Pharmacy Med Name: LOSARTAN/HCTZ 100/12.5MG TABLETS] 90 tablet 0     Sig: TAKE 1 TABLET BY MOUTH EVERY DAY    Diuretics/Combination Diuretics Refill Protocol  Passed - 1/23/2019  2:33 PM       Passed - Visit with PCP or prescribing provider visit in past 12 months    Last office visit with prescriber/PCP: 12/19/2018 Dillan Kim MD OR same dept: 12/19/2018 Dillan Kim MD OR same specialty: 12/19/2018 Dillan Kim MD  Last physical: 8/17/2017 Last MTM visit: Visit date not found   Next visit within 3 mo: Visit date not found  Next physical within 3 mo: Visit date not found  Prescriber OR PCP: Dillan Kim MD  Last diagnosis associated with med order: 1. HTN (hypertension)  - losartan-hydrochlorothiazide (HYZAAR) 100-12.5 mg per tablet [Pharmacy Med Name: LOSARTAN/HCTZ 100/12.5MG TABLETS]; TAKE 1 TABLET BY MOUTH EVERY DAY  Dispense: 90 tablet; Refill: 0    If protocol passes may refill for 12 months if within 3 months of last provider visit (or a total of 15 months).            Passed - Serum Potassium in past 12 months     Lab Results   Component Value Date    Potassium 4.3 11/28/2018            Passed - Serum Sodium in past 12 months     Lab Results   Component Value Date    Sodium 135 (L) 11/28/2018            Passed - Blood pressure on file in past 12 months    BP Readings from Last 1 Encounters:   12/19/18 129/73            Passed - Serum Creatinine in past 12 months     Creatinine   Date Value Ref Range Status   11/28/2018 0.89 0.70 - 1.30 mg/dL Final

## 2021-06-23 NOTE — PROGRESS NOTES
TCM DISCHARGE FOLLOW UP CALL    Discharge Date:  2/9/2019  Reason for hospital stay (discharge diagnosis)::  CP PCI, RANCHO x2, rotablator arthrectomy  Are you feeling better, the same or worse since your discharge?:  Patient is feeling better (Denies CP. He had mild YEN walking room to room. He it tired. Groing site with small amount of bruising. )  Do you feel like you have a plan in the event of a health emergency?: Yes (would call clinic)    As part of your discharge plan, were  home care services ordered for you?: No    Did you receive any new medications, or was there a change to your medications?: Yes    Are you taking those medications, or do you have any established regiment?:  Reviewed new meds. He is taking them correctly  Do you have any follow up visits scheduled with your PCP or Specialist?:  Yes, with PCP and Yes, with Specialist (2/12 Dr Kim)  (RN) Is PCP appt scheduled soon enough (within 14 days of discharge date)?: Yes    Who are you seeing and when is it scheduled?:  [facility]    2/22/2019  RN NOTES::  Pt very overwhelmed with diabetes dx. Reviewed how to use Plate Method for meals until he can see a CDE.

## 2021-06-23 NOTE — TELEPHONE ENCOUNTER
RN cannot approve Refill Request    RN can NOT refill this medication med is not covered by policy/route to provider. Last office visit: 12/19/2018 Dillan Kim MD Last Physical: 8/17/2017 Last MTM visit: Visit date not found Last visit same specialty: 12/19/2018 Dillan Kim MD.  Next visit within 3 mo: Visit date not found  Next physical within 3 mo: Visit date not found      Bina Salazar, [facility] Triage/Med Refill 1/11/2019    Requested Prescriptions   Pending Prescriptions Disp Refills     ibuprofen (ADVIL,MOTRIN) 800 MG tablet [Pharmacy Med Name: IBUPROFEN 800MG TABLETS] 30 tablet 0     Sig: TAKE 1 TABLET BY MOUTH AS NEEDED    There is no refill protocol information for this order

## 2021-06-24 NOTE — TELEPHONE ENCOUNTER
I called patient and verified that he uses [employer].    Hospital notes state to check BS twice daily.   Supplies t'd up

## 2021-06-24 NOTE — TELEPHONE ENCOUNTER
Faxed records and letter to [facility] ([facility]) for a fast appeal at fax# [phone]. Will wait and follow up next week.

## 2021-06-24 NOTE — TELEPHONE ENCOUNTER
Medication Request  Medication name: Metformin 500 mg tab   Pharmacy Name and Location: [facility]   Reason for request: Prescribed by ED   When did you use medication last?:  Last ordered 2/9/19  Okay to leave a detailed message: yes

## 2021-06-24 NOTE — TELEPHONE ENCOUNTER
Just an FYI -     I received a call from Luke with  Appeal Dept. She communicated that they have approved the service and for 10 visits. They will be sending a letter out to the patient and to the provider. I will send the referral and records over to [facility] so we can get rolling on this. I will also reach out to patient to let him know as well.

## 2021-06-24 NOTE — TELEPHONE ENCOUNTER
Spoke with patient. He has not had his BMP check. He will get it done today or on Monday. Lab ordered. Reports BP running in 117/130/59-77 HR in 60's/-70's. Reports improvement in shortness of breath and leg edema. BMP pending. Will f/u once BMP result available.

## 2021-06-24 NOTE — PROGRESS NOTES
ITP ASSESSMENT   Assessment Day: 30 Day    Session Number: 2  Precautions: Remaining CAD, AAA, LBP, deconditioned    Diagnosis: MI;Stent    Risk Stratification: High    Referring Provider: El De Oliveira MD  EXERCISE  Exercise Assessment: Reassessment                                Exercise Plan  Goals Next 30 days  ADL'S: Pt to resume laundry with less fatigue(2.3 MET)    Leisure: Pt to resume shopping with cart with less SOB/fatigue (3.5+ MET)    Work: Retired      Education Goals: Patient can state cardiac s/s and appropriate emergency response.    Education Goals Met: Medication review.;Has system for taking medication.                          Goals Met  Initial Progression: Will address goals when pt returns to rehab.  Pt getting consult at [facility] for AAA repair.      Exercise Prescription  Exercise Mode: Treadmill;Bike;Nustep;Arm Erg.;Hallway Walking;Stairs    Frequency: 2x/week    Duration: 25-35 minutes    Intensity / THR: 20-30 beats above resting heart rate    RPE 11-14  Progression / Met level: 2.2-3-3.5    Resistive Training?: Yes      Current Exercise (mins/week): 1      Interventions  Home Exercise:  Mode: Walking, Biking    Frequency: 2-3x/week    Duration: 10 minutes 2-3x/day      Education Material : Educational videos;Provide written material;Individual education and counseling;Offer educational classes (PH II Folder given to patient)      Education Completed  Exercise Education Completed: Cardiac Anatomy;Signs and Symptoms;Medication review;RPE;Emergency Plan;Home Exercise;Warm up/cool down;FITT Principles;BP/HR Reponse to exercise;Stretching;Strength training;Benefits of Exercise;End point of exercise              Exercise Follow-up/Discharge  Follow up/Discharge: Will address exercise goals when pt returns to rehab.           NUTRITION  Nutrition Assessment: Reassessment      Nutrition Risk Factors:  Nutrition Risk Factors: Overweight;Dyslipidemia;Diabetes  HbA1c: 7.1 (2/7/2019)  Monitors  "blood sugar at home: Yes  Frequency: 1x/day  Cholesterol: 146 (11/28/2018)  LDL: 82  HDL: 30  Triglycerides: 170      Nutrition Plan  Interventions  Diet Consult: Completed    Other Nutrition Intervention: Diet Class;Therapist/Pt Discussion;Provide with Written Material    Initial Rate Your Plate Score: 0 (Incomplete missing Q.1-5,7,14,15)      Education Completed  Nutrition Education Completed: Weight management (Brief suggestions for weight loss / See RD at future visit)      Goals  Nutrition Goals (Next 30 days): Patient can identify their risk factors for CAD;Patient will follow a low sodium diet;Patient will follow a low saturated fat diet;Patient knows appropriate portion size;Patient will lose weight      Goals Met  Nutrition Goals Met: Completed Nutritional Risk Screen;Provided Rate your Plate Survey;Reviewed Dietitian schedule      Height, Weight, and  BMI  Weight: 244 lb 3.2 oz (110.8 kg)  Height: 5' 10\" (1.778 m)  BMI: 35.04      Nutrition Follow-up  Follow-up/Discharge: Will address diet goals when pt returns to rehab.       Other Risk Factors  Other Risk Factor Assessment: Reassessment      HTN Risk Factor: Hypertension      Pre Exercise BP: 134/78  Post Exercise BP: 132/78      Hypertension Plan  Goals  HTN Goals: Follow low sodium diet;Exercises regularly      Goals Met  HTN Goals Met: Take medication as prescribed      HTN Interventions  HTN Interventions: Diet consult;Therapist/patient discussion;Provide written material;Offer educational videos;Offer educational classes (Scheduled for 2/19 at 0830)      HTN Education Completed  HTN Education Completed: Low sodium diet;Medication review;Risk factor overview      Tobacco Risk Factor: NA        Risk Factor Follow-up   Follow-up/Discharge: Will address HTN goals when pt returns to rehab.         PSYCHOSOCIAL  Psychosocial Assessment: Reassessment       [facility] STEFFANY Q of L Summary Score: 23      DORI-D Score: 14      Psychosocial Risk Factor: " NA      Psychosocial Plan  Interventions  Interventions: Offer educational videos and classes;Provide written material;Individual education and counseling      Education Completed  Education Completed: Relaxation/Coping Techniques;S/S of depression;Effects of stress on body      Goals  Goals (Next 30 days): Improvement in [employer] COOP score      Goals Met  Goals Met: Identified Support system;Oriented to stress management classes;Identify stressors;Practicing stress management skills      Psychosocial Follow-up  Follow-up/Discharge: Will address stress mgmt goals when pt returns to rehab.           Patient involved in Goal setting?: No      Signature: _____________________________________________________________    Date: __________________    Time: __________________

## 2021-06-24 NOTE — PROGRESS NOTES
MTM Follow Up Encounter  Assessment & Plan                                                     1. Coronary artery disease due to lipid rich plaque  Currently on appropriate therapy with DAPT and moderate intensity statin. He hasn't had any concerns for bleeding or muscle pain with medications. LDL will be assessed in about 3 months. If needed, can increase to high intensity statin for LDL >70.   - educate on potential signs of bleeding with DAPT  - consider high intensity statin if LDL >70 at follow up    2. Type 2 diabetes mellitus treated without insulin (H)  Recent diagnosis of diabetes with A1C of 7.1% on 2/7. Currently stable of metformin 500 mg twice daily without complaint and willing to titrate up to a total of 1500 mg daily; if stable on 1500 daily for 1 week, instructed to titrate up to 2000 mg daily. Stable on aspirin, pravastatin, and losartan-HCTZ.  -increase metformin to 500mg QAM, and 1000mg QPM, titrating to goal dose 2000mg daily     Follow Up  Return for cardiology .    Subjective & Objective                                                       Koko Ronquillo is a 73 y.o. male coming in for a follow up visit for Medication Therapy Management. He was referred to me from Dillan Kim MD    Chief Complaint: 1 week follow up     Medication Adherence/Access: stable, no issues identified, takes pills out of the bottles and feels comfortable with this.     CAD: following up with cardiology NP this morning. Continues on Brilinta, aspirin 81mg daily, metoprolol, losartan/hctz. Denies chest pain since discharge and has not needed to use nitroglycerin. His pravastatin dose was increased at discharge to 40mg daily and will follow up in about 3 months to reassess labs.    Diabetes: this is a new diagnosis for him, he has confusion around how to manage his sugars. Denies upset stomach or diarrhea. He is taking metformin 500 mg 1 tab two times a day. He has checked a few blood sugars. He will see  diabetes education on 3.6.19.   Fasting sugars: 128, 131   Lab Results   Component Value Date    HGBA1C 7.1 (H) 2019    HGBA1C 6.4 (H) 2018     Lab Results   Component Value Date    LDLCALC 82 2018    CREATININE 1.02 2019     PMH: reviewed in EPIC   Allergies/ADRs: reviewed in EPIC   Alcohol: reviewed in EPIC   Tobacco:   Social History     Tobacco Use   Smoking Status Former Smoker     Last attempt to quit: 2007     Years since quittin.9   Smokeless Tobacco Never Used     ----------------    Much or all of the text in this note was generated through the use of Dragon Dictate voice-to-text software. Errors in spelling or words which seem out of context are unintentional. Sound alike errors, in particular, may have escaped editing.    The patient declined an after visit summary    I spent 30 minutes with this patient today;   All changes were made via collaborative practice agreement with Dillan Kim MD. A copy of the visit note was provided to the patient's provider.     Fabricio Hampton PharmD IV Candidate 2019     I spent 100% of the time in direct supervision of the PharmD IV student. I have reviewed the note and agree with the assessment/plan as it is now written.     Con Vargas, PharmD, BCACP  Medication Management (MTM) Pharmacist  Formerly [facility], [facility] & [facility]    Current Outpatient Medications   Medication Sig Dispense Refill     aspirin 81 MG EC tablet Take 81 mg by mouth daily.       CARTIA  mg 24 hr capsule TAKE 1 CAPSULE BY MOUTH EVERY DAY 90 capsule 3     metFORMIN (GLUCOPHAGE) 500 MG tablet Take 1 tablet (500 mg total) by mouth 2 (two) times a day with meals. (Patient taking differently: Take 500 mg by mouth 2 (two) times a day with meals.       ) 60 tablet 0     metoprolol succinate (TOPROL-XL) 100 MG 24 hr tablet TAKE 1 TABLET(100 MG) BY MOUTH DAILY 30 tablet 6     betamethasone dipropionate (DIPROLENE) 0.05 % ointment APPLY TO  AFFECTED AREA SPARINGLY TWICE DAILY (Patient taking differently: APPLY TO AFFECTED AREA SPARINGLY TWICE DAILY AS NEEDED) 45 g 0     ciclopirox 0.77 % gel Apply topically as needed.              CLOBETASOL PROPIONATE, BULK, MISC Use 1 application As Directed as needed.       folic acid (FOLVITE) 1 MG tablet Take 1 tablet by mouth every day. Do not take the day you take methotrexate        4     hydroCHLOROthiazide (HYDRODIURIL) 12.5 MG tablet Take 1 tablet (12.5 mg total) by mouth daily. 30 tablet 2     hydroCHLOROthiazide (HYDRODIURIL) 25 MG tablet Take 1 tablet (25 mg total) by mouth daily. 30 tablet 11     hydrocortisone 2.5 % cream Apply 1 application topically as needed.             ketoconazole (NIZORAL) 2 % shampoo Apply topically as needed.       losartan (COZAAR) 100 MG tablet Take 1 tablet (100 mg total) by mouth daily. 30 tablet 11     methotrexate 2.5 MG tablet Take 12.5 mg by mouth once a week. On Tuesdays             multivitamin therapeutic tablet Take 1 tablet by mouth daily.       nitroglycerin (NITROSTAT) 0.4 MG SL tablet Place 1 tablet (0.4 mg total) under the tongue every 5 (five) minutes as needed for chest pain. 20 tablet 1     pravastatin (PRAVACHOL) 40 MG tablet Take 1 tablet (40 mg total) by mouth at bedtime. 90 tablet 1     tadalafil (CIALIS) 20 MG tablet Take as directed.       tamsulosin (FLOMAX) 0.4 mg cap TAKE 1 CAPSULE(0.4 MG) BY MOUTH TWICE DAILY 180 capsule 2     ticagrelor (BRILINTA) 90 mg Tab Take 1 tablet (90 mg total) by mouth 2 (two) times a day. 180 tablet 3     No current facility-administered medications for this visit.

## 2021-06-24 NOTE — TELEPHONE ENCOUNTER
I will be sending the OON request and records to the OON review team to review if they will approve for pt to go to [facility] or not. [facility] is out of network, not in the [facility] care system. Will submit request to review team for review. Process takes about two weeks. Communicated to pt I will keep him posted. If pt should have any questions or concerns, OK to give me a call. Pt stated understanding.    02/15 Pt called back and stated that [facility] is not in his INS network. He would like to proceeds with the OON request to our review team. Communicated to pt that it does take up to 2 weeks for them to review, but i'll keep him posted. Pt stated   understanding.    02/15 Called and spoke to pt, pt is going to check with his INS to see if [facility] is in his insurance network. He will call me back at my direct #.    [facility]  P [phone]  F [phone]

## 2021-06-24 NOTE — TELEPHONE ENCOUNTER
Pt has large supply of Hyzaar (Losartan-HCTZ 100-12.5 mg) left.  Pt would like rx sent for HCTZ 12.5 mg tablets so he can use up his supply of Hyzaar.    Message sent to Edu for recommendations.  -moy

## 2021-06-24 NOTE — PATIENT INSTRUCTIONS - HE
Refer to diabetic education    Refer to Vascular Surgery    Begin checking blood sugars twice daily and bring into your diabetic education appt.    Check BMP (due to recent angiogram as well as start on metformin)     One month follow up

## 2021-06-24 NOTE — TELEPHONE ENCOUNTER
--- Message -----  From: Edu Schmitz NP  Sent: 3/5/2019   8:46 AM  To: Erica Argueta MD, Mayi Hart RN    You are welcome!  He is already on Metoprolol Succinate 100 mg daily and his HR is stable-will keep him on current BP regimen. He is seeing you early April.  CY

## 2021-06-24 NOTE — TELEPHONE ENCOUNTER
Called and informed patient of Dr. Argueta's recommendations. Pt agreeable to plan confirmed follow-up plans for 4/1 with Dr. Argueta. No further questions/concerns.

## 2021-06-24 NOTE — TELEPHONE ENCOUNTER
This denial and phone message should be sent to Dr Alcala (Vascular Surgery) for his review.  He may need to talk the the pt's insurance company if he wants pt seen at [facility].

## 2021-06-24 NOTE — TELEPHONE ENCOUNTER
From: Erica Argueta MD  Sent: 3/1/2019   3:23 PM  To: Edu Schmitz NP, Mayi Hart, RN    Thank you both for your help with this gentleman.  If his blood pressure remains the weight is as does his pulse and renal profile looks stable I would not change his medications, specifically do not restart metoprolol and continue HCTZ at currently increased higher dose.  Hopefully has a follow-up with me in the future.  LF  ----- Message -----  From: Edu Schmitz NP  Sent: 3/1/2019  11:03 AM  To: Erica Argueta MD, Mayi Hart, BENSON See!  Thank you for the date!  His BP has impoved on increased dose of HCTZ. Hope his LE edema, shortness of breath and weight has improved too. Please make sure he gets his BMP check done as recommended.    Dr. Argueta,  Do you still want him to increase his Metoprolol dose. He is seeing his PCP on  3/13/19 and you in early April.   CY

## 2021-06-24 NOTE — TELEPHONE ENCOUNTER
Patient returned call. Educated pt on the use of SL nitroglycerine. Informed him that the Rx has been send to his preferred pharmacy. Pt verbalized understanding and also confirmed upcomming appointments. No further questions/concerns.

## 2021-06-24 NOTE — TELEPHONE ENCOUNTER
Received call from Luke with  Appeal dept - [phone]. She wanted to know if the [facility] the [facility] and if this request is needing to be perform soon. Informed Luke, per Dr. Alcala's note, only [facility] performs this procedure. She asked about how many visits we are requesting. Communicated and asked for possibly 10 visits for now - consult, surgery and any follow up appts. If approved and needed more visits, they will add per Luke. She will get back to me by the end of today to let me know the status of this appeal.

## 2021-06-24 NOTE — TELEPHONE ENCOUNTER
The letter should come from the Vascular Surgeon who is recommending the pt go to [facility].  Please request [facility] (Dr Alcala) to write this letter.  Thanks

## 2021-06-24 NOTE — PROGRESS NOTES
Assessment: pt seen today for DM education. Spouse is present as well. Pt is newly diagnosed. He has a meter at home and has been checking BG. He reports BG this morning was 122 and that this is typically what they are around. Reviewed DM diagnosis, patho, risks and complications. Reviewed BG and A1c goals.   Reviewed DM meal plan, cho vs non cho foods, portions and healthy eating. Pt notes he has a sweet tooth. He states he can eat an entire box of thin mints with milk in 1 sitting. Wife states she has been looking at labels and trying to help him with portions. He usually eats 3 meals/day.   Discussed the importance of exercise. Right now he is limited given his recent hospitalization and AAA. He is interested in loosing weight. Weighed today and he is down 5# since his last check. Discussed watching fat as well as carbs for weight loss.   Pt is taking his metformin 1,500 mg/day, tolerating well.     Plan: check BG 1x/day rotating before and 2 hours after meals, log in log book and bring to all f/u appts. Start to familiarize yourself with cho foods and portions. Goal 4-5/meal and 1-2/snacks.     Subjective and Objective:      Koko TRA Ronquillo is referred by Dr. Kim for Diabetes Education.     Lab Results   Component Value Date    HGBA1C 7.1 (H) 02/07/2019       Current diabetes medications:  Metformin 500 two times a day       Follow up:   CDE (certified diabetic educator)  Pt will f/u with Dr. Kim next week as scheduled. Would like to f/u here PRN. Pt agrees to call if he has more than 3 BG out of range per week or feels he is struggling with diet.     Education:     Monitoring   Meter (per above goals): Discussed  Monitoring: Assessed, Discussed and Literature provided  BG goals: Discussed and Literature provided    Nutrition Management  Nutrition Management: Assessed, Discussed and Literature provided  Weight: Assessed and Discussed  Portions/Balance: Assessed, Discussed and Literature provided  Carb  ID/Count: Assessed, Discussed and Literature provided  Label Reading: Assessed, Discussed and Literature provided  Heart Healthy Fats: Assessed, Discussed and Literature provided  Menu Planning: Assessed, Discussed and Literature provided  Dining Out: Assessed, Discussed and Literature provided  Physical Activity: Assessed and Discussed  Medications: Discussed    Diabetes Disease Process: Discussed and Literature provided    Acute Complications: Prevent, Detect, Treat:  Hypoglycemia: Discussed and Literature provided  Hyperglycemia: Assessed, Discussed and Literature provided  Sick Days: Discussed  Driving: Not addressed    Chronic Complications  Foot Care:Discussed  Skin Care: Discussed  Eye: Discussed  ABC: Assessed and Discussed  Teeth:Needs instruction/review at follow-up  Goal Setting and Problem Solving: Assessed and Discussed  Barriers: Assessed and Discussed  Psychosocial Adjustments: Assessed and Discussed      Time spent with the patient: 60 minutes for diabetes education and counseling.   Previous Education: no  Visit Type:DSMT  Hours Remaining: DSMT 9 and MNT 3      Cady Dash  3/6/2019

## 2021-06-24 NOTE — TELEPHONE ENCOUNTER
From: Erica Argueta MD  Sent: 2/14/2019   2:48 PM  To: Audrey Sepulveda RN  Subject: FW: Nitro prescription                           Can we please prescribe  nitroglycerin, 0.4 mg tablets, 1 every 5 minutes sublingually as needed chest discomfort x3.  20 tablets with 1 refill thank you.  LF      Medication placed per protocol. Attempted to call patient however, VM box full.

## 2021-06-24 NOTE — TELEPHONE ENCOUNTER
Fax received from [facility] pharmacy requesting Prior Authorization    Medication Name: Accu-Chek Guide Test Strips    Insurance Plan:    [facility] Phone Number: [phone]    Patient ID Number: [id number]    Please start PA process

## 2021-06-24 NOTE — TELEPHONE ENCOUNTER
Pt has  Medicare Advantage as a supplement. Medicare stated that if pt was to have just Medicare A&B they would cover for the services with [facility], but since he has the  Medicare Advantage, [employer] take over.     Received a call from pt, pt is very frustrated and confused with all this. He stated he spoke with his Medicare and was told [employer] has to approve. Pt asked why it is so complicated. Reiterate why our vascular surgeon is recommending pt to go to [facility]. Explained to him again that he needs a fenestrated endograft that is why Dr. Alcala is recommending [facility] or [facility] for pt to have they surgery, since he is high risk. Informed pt, that his provider Dr Albright is aware of what's going on and we are working on it. Pt stated understanding. Communicated to him that I will keep him posted.

## 2021-06-24 NOTE — TELEPHONE ENCOUNTER
Medication Request  Medication name: Accu-Chek Alexa test strips and lancets  Pharmacy Name and Location: [facility]  Reason for request: Patient stated he received a meter from the hospital and now he needs the supplies. Patient stated he is testing twice a day. Patient stated he is out.  When did you use medication last?:  yesterday  Okay to leave a detailed message: No

## 2021-06-24 NOTE — PROGRESS NOTES
ITP ASSESSMENT   Assessment Day: Initial    Session Number: 1/2  Precautions: Remaining CAD, AAA, LBP, Deconditioned    Diagnosis: MI;Stent    Risk Stratification: High    Referring Provider: El De Oliveira MD   ITP: Dr. Argueta  EXERCISE  Exercise Assessment: Initial       6 Minute Walk Test   Pre   Pre Exercise HR: 67                    Pre Exercise BP: 134/78      Peak  Peak HR: 97                   Peak BP: 178/90    Peak feet: 845    Peak O2 SAT: 95    Peak RPE: 13    Peak MPH: 1.6      Symptoms:  Peak Symptoms: denies cv sx/sx       5 mins. Post  5 Min Post HR: 60 (96% RA)    5 Min Post BP: 132/78                           Exercise Plan  Goals Next 30 days  ADL'S: Pt to resume laundry with less fatigue(2.3 MET)    Leisure: Pt to resume shopping with cart with less SOB/fatigue (3.5+ MET)    Work: Retired        Education Goals: Patient can state cardiac s/s and appropriate emergency response.    Education Goals Met: Medication review.;Has system for taking medication.      Exercise Prescription  Exercise Mode: Treadmill;Bike;Nustep;Arm Erg.;Hallway Walking;Stairs    Frequency: 2x/week    Duration: 25-35 minutes    Intensity / THR: 20-30 beats above resting heart rate    RPE 11-14  Progression / Met level: 2.2-3-3.5    Resistive Training?: Yes      Current Exercise (mins/week): 1      Interventions  Home Exercise:  Mode: Walking, Biking    Frequency: 2-3x/week    Duration: 10 minutes 2-3x/day      Education Material : Educational videos;Provide written material;Individual education and counseling;Offer educational classes (PH II Folder given to patient)      Education Completed  Exercise Education Completed: Cardiac Anatomy;Signs and Symptoms;Medication review;RPE;Emergency Plan;Home Exercise;Warm up/cool down;FITT Principles;BP/HR Reponse to exercise;Stretching;Strength training;Benefits of Exercise;End point of exercise              Exercise Follow-up/Discharge  Follow up/Discharge: Pt is not an exerciser  "but is open to starting an exercise program. Pt will be walking or biking for HEP. Pt is motivated to make lifestyle changes. Pt is deconditioned.    NUTRITION  Nutrition Assessment: Initial      Nutrition Risk Factors:  Nutrition Risk Factors: Overweight;Dyslipidemia;Diabetes (New Diabetic)  HbA1c: 7.1 (2/7/2019)  Monitors blood sugar at home: Yes  Frequency: 1x/day  Cholesterol: 146 (11/28/2018)  LDL: 82  HDL: 30  Triglycerides: 170      Nutrition Plan  Interventions  Other Nutrition Intervention: Therapist/Pt Discussion;Provide with Written Material    Education Completed  Nutrition Education Completed: Low Saturated fat diet;Risk factor overview;Low sodium diet;Weight management      Goals  Nutrition Goals (Next 30 days): Patient can identify their risk factors for CAD;Patient will follow a low sodium diet;Patient will follow a low saturated fat diet;Patient knows appropriate portion size;Patient will lose weight      Goals Met  Nutrition Goals Met: Completed Nutritional Risk Screen;Provided Rate your Plate Survey;Reviewed Dietitian schedule      Height, Weight, and  BMI  Weight: 244 lb 3.2 oz (110.8 kg)  Height: 5' 10\" (1.778 m)  BMI: 35.04      Nutrition Follow-up  Follow-up/Discharge: Scheduled dietician appt 2/19 at 0830. SPent much time on a heart healthy diet with pt. Reviewed low fat and low salt and also reviewed carb intake. Pt and s/o do not cook. They eat out all their meals. Pt does not drink water and definitely encouraged more of this. Talked about reading labels and watching portion sizes. Pt is open to changing his diet and just needs more education on what he needs to do. Pt will be meeting with a diabetic educator. Pt is a new diabetic and new to medications. Pt wants to lose weight also. Pt does not eat a heart healthy diet. Pt drinks whole milk and eats burgers 3-4x/week.          Other Risk Factors  Other Risk Factor Assessment: Initial      HTN Risk Factor: Hypertension      Pre Exercise " "BP: 134/78  Post Exercise BP: 132/78      Hypertension Plan  Goals  HTN Goals: Follow low sodium diet;Exercises regularly      Goals Met  HTN Goals Met: Take medication as prescribed      HTN Interventions  HTN Interventions: Diet consult;Therapist/patient discussion;Provide written material;Offer educational videos;Offer educational classes (Scheduled for 2/19 at 0830)      HTN Education Completed  HTN Education Completed: Low sodium diet;Medication review;Risk factor overview      Tobacco Risk Factor: NA        Risk Factor Follow-up   Follow-up/Discharge: Pt quit smoking >10 years ago     PSYCHOSOCIAL  Psychosocial Assessment: Initial       [facility]      DORI-D Score: 14      Psychosocial Risk Factor: NA      Psychosocial Plan  Interventions  If DORI-D > 15 send letter to MD  Interventions: Offer educational videos and classes;Provide written material;Individual education and counseling      Education Completed  Education Completed: Relaxation/Coping Techniques;S/S of depression;Effects of stress on body      Goals  Goals (Next 30 days): Improvement in [facility] COOP score      Goals Met  Goals Met: Identified Support system;Oriented to stress management classes;Identify stressors;Practicing stress management skills      Psychosocial Follow-up  Follow-up/Discharge: Pt does not feel he has stress but does feel he has a lot going on now with his health i.e stents, AAA, new diabetic. He does feel more nervousregarding his AAA. Pt has a good support system in his significant other. Pt enjoys shooting guns and riding/tinkering with motorcycles. Pt does report \"I don't do much\". Spent much time on education today. Pt denies feeling depressed but did score moderate on DORI-D             Patient involved in Goal setting?: Yes        "

## 2021-06-24 NOTE — TELEPHONE ENCOUNTER
From: Edu Schmitz NP  Sent: 2/22/2019   1:45 PM  To: Erica Argueta MD, Audrey Sepulveda RN    Thank you Dr Argueta!    Audrey,  Could you please follow up on patient with rec from Dr. Argueta. I increased his HCTZ today from 12.5 mg to 25 mg. Please ask him to monitor his BP and HR at home and call us with readings in a week.  I am happy to see him back in couple weeks to up titrate his betablocker.   Thank you  Edu        Called patient and advised him to take his BP and HR for a week and record them. Informed him that I will call him back to obtain recordings. -kcl

## 2021-06-24 NOTE — PROGRESS NOTES
Cardiac Rehab  Phase II Assessment    Assessment Date: 2/14/2019    Diagnosis: Possible NSTEMI?  Date of Onset: 2/6/2019  Procedure: PCI w/RANCHO x 2 to proximal and mid RCA w/rotational atherectomy   Date of Onset: 2/8/2019  ICD/Pacemaker: No   Post-op Complications: None  ECG History: SB/SR , h/o a-fib in 2016 EF%: 62% (2/7/2019)  Past Medical History:   *Medically manage OM (90%) and D2 (90%)- good flow but not optional for PCI due to small size  Patient Active Problem List   Diagnosis     Obesity     Hyperlipidemia     Hypertension     Seborrheic Dermatitis     Anxiety     Esophageal Reflux     Rheumatoid arthritis anti ccp 1200     Psoriasis     High risk medication use     Chest pain made worse by breathing     Fatigue     Hematuria     CIS (carcinoma in situ of bladder)     Acute chest pain     Type 2 diabetes mellitus treated without insulin (H)     Positive cardiac stress test     Troponin level elevated     Coronary artery disease due to lipid rich plaque     Abdominal aortic aneurysm (AAA) without rupture (H)   CAD  Aneurysmal dilatation of distal ascending thoracic aorta and intrarenal abdominal aorta- has increased in size f/u with vascular 3/1/2019    Past Medical History:   Diagnosis Date     Anxiety      Atrial fibrillation (H)      HLD (hyperlipidemia)      HTN (hypertension)      Psoriasis      RA (rheumatoid arthritis) (H)      Sleep apnea     does not use CPAP     Past Surgical History:   Procedure Laterality Date     ABDOMINAL AORTIC ANEURYSM REPAIR  1995     aortic bypass       CV CORONARY ANGIOGRAM N/A 2/8/2019    Procedure: Coronary Angiogram;  Surgeon: El De Oliveira MD;  Location: [facility];  Service: Cardiology     HERNIA REPAIR      with nesh     nasal cauterization       TRANSURETHRAL RESECTION OF BLADDER TUMOR N/A 3/1/2017    Procedure: CYSTOSCOPY BLADDER BIOPSY AND FULGURATION;  Surgeon: Kostas Smith MD;  Location: [facility] OR;  Service:        Physical  Assessment  Precautions/ Physical Limitations: LBP, Remaining CAD, AAA, Deconditioned  Oxygen: No  O2 Sats: 96% RA at rest Lung Sounds: Clear in all lobes posterior Edema: +1 B ankle edema  Incisions: R radial artery healed well  Sleeping Pattern: good   Appetite: good   Nutrition Risk Screen: Completed    Pain    Intensity: (0-10 scale) 0        Psychosocial/ Emotional Health  1. In the past 12 months, have you been in a relationship where you have been abused physically, emotionally, sexually or financially? No  notified: NA  2. Who do you turn to for emotional support?: Eleln, significant other  3. Do you have cultural or spiritual needs? No  4. Have there been any major life changes in the past 12 months? Yes , Health    Referral Information  Primary Physician: Dillan Kim MD  Cardiologist: Dr. Argueta  Surgeon/Interventionalist: Dr. De Oliveira    Home exercise/Equipment: Treadmill, Recumbent Bike, Full body weight machine    Patient's long-term goal(s): 1. Lose Weight 2. Strength and Condition the heart    1. Living Accommodations: Home Steps: Yes      Support people at home: Ellen, Significant Other   2. Marital Status: Significant Other  3. Family is able to assist with cares      [place]/Community involvement: None  4. Recreation/Hobbies: Ride motorcycle, Shoot guns-gun range, Wrench on Motorcycle, Travel

## 2021-06-24 NOTE — TELEPHONE ENCOUNTER
Dr. Alcala reviewed over patient's CTA. He would like vascular referral sent over to [facility]. Writer notified patient and he will call to set up appointment. Also faxed referral over to [facility] @[phone].

## 2021-06-24 NOTE — TELEPHONE ENCOUNTER
I discussed this with Dr. Alcala. He says that the patient is too high risk to have open surgery. The patient will need a fenestrated endograft which [place] and [place] only has. Can you check to see if his insurance will accept [facility]?

## 2021-06-24 NOTE — TELEPHONE ENCOUNTER
I spoke to [facility] and they cannot authorize the request. It is a Medicare plan so it is out of their hands. Per [facility], the next step is to appeal and see if it will be approve. They will need supporting documents and statement/letter from providers why patient is needing to go to [place].    Dr. Kim & Dr. Alcala, would you be willing to write a letter and I will submit the letters and patient's records to [facility] for the appeal?

## 2021-06-24 NOTE — TELEPHONE ENCOUNTER
Pt wondering if question about cardiac rehab has been answered yet.    Per CY 2/22/19 OV: Cardiac rehab has been ordered-we will discuss with Dr. Argueta if should hold given increase in aneurysm size on recent CTA.

## 2021-06-24 NOTE — TELEPHONE ENCOUNTER
MTM Transition of Care Encounter  Assessment & Plan                                                     1. Coronary artery disease due to lipid rich plaque  S/p stenting, provided education on each of his medications including indication and risks versus benefits of each medication.  Educated not to take any additional medications containing aspirin or during the day due to Brilinta.  Discussed long-term use of these medications going forward.  Dose of pravastatin was increased, recommend reassess lipids in 3 months, if LDL is not under 70 at that time, recommend to use higher intensity statin such as rosuvastatin.  -Consider reassessing lipids in 3 months, may use higher intensity statin at follow-up    2. Paroxysmal atrial fibrillation (H)  Stable on rate control with cardiac and metoprolol; based on previous notes from cardiology, he was not placed on anticoagulation due to comorbidity of rheumatoid arthritis and the medications used to treat his condition.  Recommend continue current regimen.    3. Type 2 diabetes mellitus treated without insulin (H)  New diagnosis of diabetes, reviewed blood sugar goals when checking with his meter so that he can monitor at home.  Discussed benefits versus potential adverse effects of metformin, however he is tolerating this well and labs are stable.  We will have him monitor sugars at home now that he will be starting cardiac rehab to see if his exercise brings his fasting sugars down, if not we will talk in 1 week to discuss his blood sugars and may consider titrating metformin to max dose 2000 mg daily.  Stable on aspirin, ARB, statin.  -Educated on monitoring blood sugars fasting  -At follow-up consider titrating metformin    4. Rheumatoid arthritis, involving unspecified site, unspecified rheumatoid factor presence (H)  Stable, follows with rheumatology, also has psoriasis.. Recommended to continue current regimen.     Follow Up  Return in about 9 days (around 2/22/2019)  "for Medication Management Pharmacist.      Subjective & Objective                                                       Koko Ronquillo is a 73 y.o. male called for a transitions of care visit. he was discharged from [facility] on February 9, 2019 for acute chest pain.  The heart disease and new diagnosis of diabetes are overwhelming to him.  He does have a lot of questions about medications.    Chief Complaint: Hospital follow-up    Medication Adherence/Access: Stable, no issues identified however has several questions about medications.    CAD: Normal ejection fraction of 62%.  He was actually at [facility] and there is no bed availability so he drove himself to [facility].  His symptoms presented as epigastric pain, which resolved by the time he got to [facility].  He underwent coronary angiogram \"found to have severe obstructive coronary disease and RCA status post rotational atherectomy and 2 drug-eluting stents.\"  He was placed on low-dose aspirin, Brilinta 90 mg twice daily for at least 12 months, and potential to switch to Plavix indefinitely thereafter.  He has appropriately increased his pravastatin to 40 mg daily.  He was not given nitroglycerin at discharge.  He will be starting cardiac rehab at the end of this week.  Follow-up with cardiology next week.  Lab Results   Component Value Date    LDLCALC 82 11/28/2018     Paroxysmal atrial fibrillation: Continues on Cartia and metoprolol for rate control.     Type 2 diabetes: New diagnosis.  He was given a glucose meter but he does not know what numbers he should be looking for.  He has somewhat worried about this and does not know how to monitor or improve his blood sugars.  He was started on metformin, labs yesterday with PCP.  Lab Results   Component Value Date    HGBA1C 7.1 (H) 02/07/2019    HGBA1C 6.4 (H) 11/28/2018     Lab Results   Component Value Date    LDLCALC 82 11/28/2018    CREATININE 1.02 02/12/2019 "     Rheumatoid arthritis: Reviewed methotrexate dosing, folic acid, and topicals for psoriasis.    PMH: reviewed in EPIC   Allergies/ADRs: reviewed in EPIC   Alcohol: reviewed in EPIC   Tobacco:   Social History     Tobacco Use   Smoking Status Former Smoker     Last attempt to quit: 2007     Years since quittin.9   Smokeless Tobacco Never Used     Recent Vitals:   BP Readings from Last 3 Encounters:   19 138/79   19 122/66   19 145/87      Wt Readings from Last 3 Encounters:   19 (!) 242 lb 9.6 oz (110 kg)   19 (!) 241 lb 12.8 oz (109.7 kg)   19 (!) 250 lb (113.4 kg)     ----------------    Much or all of the text in this note was generated through the use of Dragon Dictate voice-to-text software. Errors in spelling or words which seem out of context are unintentional. Sound alike errors, in particular, may have escaped editing.    The patient declined an after visit summary    I spent 30 minutes with this patient today;  . All changes were made via collaborative practice agreement with Dillan Kim MD. A copy of the visit note was provided to the patient's provider.     Con Vargas, PharmD, BCACP  Medication Management (MTM) Pharmacist  [facility]    Current Outpatient Medications   Medication Sig Dispense Refill     aspirin 81 MG EC tablet Take 81 mg by mouth daily.       betamethasone dipropionate (DIPROLENE) 0.05 % ointment APPLY TO AFFECTED AREA SPARINGLY TWICE DAILY (Patient taking differently: APPLY TO AFFECTED AREA SPARINGLY TWICE DAILY AS NEEDED) 45 g 0     CARTIA  mg 24 hr capsule TAKE 1 CAPSULE BY MOUTH EVERY DAY 90 capsule 3     ciclopirox 0.77 % gel Apply topically as needed.              CLOBETASOL PROPIONATE, BULK, MISC Use 1 application As Directed as needed.       folic acid (FOLVITE) 1 MG tablet Take 1 tablet by mouth every day. Do not take the day you take methotrexate        4     hydrocortisone 2.5  % cream Apply 1 application topically as needed.             ketoconazole (NIZORAL) 2 % shampoo Apply topically as needed.       losartan-hydrochlorothiazide (HYZAAR) 100-12.5 mg per tablet TAKE 1 TABLET BY MOUTH EVERY DAY 90 tablet 2     metFORMIN (GLUCOPHAGE) 500 MG tablet Take 1 tablet (500 mg total) by mouth 2 (two) times a day with meals. 60 tablet 0     methotrexate 2.5 MG tablet Take 12.5 mg by mouth once a week. On Tuesdays             metoprolol succinate (TOPROL-XL) 100 MG 24 hr tablet TAKE 1 TABLET(100 MG) BY MOUTH DAILY 30 tablet 6     multivitamin therapeutic tablet Take 1 tablet by mouth daily.       pravastatin (PRAVACHOL) 40 MG tablet Take 1 tablet (40 mg total) by mouth at bedtime. 30 tablet 0     tadalafil (CIALIS) 20 MG tablet Take as directed.       tamsulosin (FLOMAX) 0.4 mg cap TAKE 1 CAPSULE(0.4 MG) BY MOUTH TWICE DAILY 180 capsule 2     ticagrelor (BRILINTA) 90 mg Tab Take 1 tablet (90 mg total) by mouth 2 (two) times a day. 60 tablet 1     No current facility-administered medications for this visit.

## 2021-06-24 NOTE — TELEPHONE ENCOUNTER
[facility] to [facility]arlyn with [facility] and [facility] is also not in pt's network. Marlyn recommend talking to the prior authorization department. She transferred me to them. I left a detailed msg and for someone to reach back to me. I will wait on call from  - Prior Auth dept and see what they say.

## 2021-06-24 NOTE — PROGRESS NOTES
DIAGNOSIS:  1. Type 2 diabetes mellitus treated without insulin (H)  Microalbumin, Random Urine   2. Hypertension         PLAN:  Patient Instructions   Next A1C due 5-7-19 or after.   Make appt.     Check urine microalbumin      HPI: blood sugars running around 100.  Has seen diabetic ed.  Walking a teeny bit.  Drinking coffee without cream or sugar.  Limiting the carbs        Current Outpatient Medications on File Prior to Visit   Medication Sig Dispense Refill     aspirin 81 MG EC tablet Take 81 mg by mouth daily.       betamethasone dipropionate (DIPROLENE) 0.05 % ointment APPLY TO AFFECTED AREA SPARINGLY TWICE DAILY (Patient taking differently: APPLY TO AFFECTED AREA SPARINGLY TWICE DAILY AS NEEDED) 45 g 0     blood glucose test strips Use 1 each As Directed 2 (two) times a day. Dispense brand per patient's insurance at pharmacy discretion. 180 strip 1     CARTIA  mg 24 hr capsule TAKE 1 CAPSULE BY MOUTH EVERY DAY 90 capsule 3     ciclopirox 0.77 % gel Apply topically as needed.              CLOBETASOL PROPIONATE, BULK, MISC Use 1 application As Directed as needed.       folic acid (FOLVITE) 1 MG tablet Take 1 tablet by mouth every day. Do not take the day you take methotrexate        4     generic lancets Use 1 each As Directed 4 (four) times a day. 100 each 6     hydroCHLOROthiazide (HYDRODIURIL) 12.5 MG tablet Take 1 tablet (12.5 mg total) by mouth daily. 30 tablet 2     hydrocortisone 2.5 % cream Apply 1 application topically as needed.             ketoconazole (NIZORAL) 2 % shampoo Apply topically as needed.       losartan (COZAAR) 100 MG tablet Take 1 tablet (100 mg total) by mouth daily. 30 tablet 11     metFORMIN (GLUCOPHAGE) 500 MG tablet Take 500 mg orally in the morning and 1000 mg orally at night. 90 tablet 3     methotrexate 2.5 MG tablet Take 12.5 mg by mouth once a week. On Tuesdays             metoprolol succinate (TOPROL-XL) 100 MG 24 hr tablet TAKE 1 TABLET(100 MG) BY MOUTH DAILY 30  "tablet 6     multivitamin therapeutic tablet Take 1 tablet by mouth daily.       nitroglycerin (NITROSTAT) 0.4 MG SL tablet Place 1 tablet (0.4 mg total) under the tongue every 5 (five) minutes as needed for chest pain. 20 tablet 1     pravastatin (PRAVACHOL) 40 MG tablet Take 1 tablet (40 mg total) by mouth at bedtime. 90 tablet 1     tadalafil (CIALIS) 20 MG tablet Take as directed.       tamsulosin (FLOMAX) 0.4 mg cap TAKE 1 CAPSULE(0.4 MG) BY MOUTH TWICE DAILY 180 capsule 2     ticagrelor (BRILINTA) 90 mg Tab Take 1 tablet (90 mg total) by mouth 2 (two) times a day. 180 tablet 3     [DISCONTINUED] hydroCHLOROthiazide (HYDRODIURIL) 25 MG tablet Take 1 tablet (25 mg total) by mouth daily. 30 tablet 11     No current facility-administered medications on file prior to visit.        Pmh: reviewed  Psh: reviewed  Allergy:  reviewed      EXAM:    /78   Pulse 72   Resp 20   Ht 5' 10\" (1.778 m)   Wt (!) 238 lb (108 kg)   SpO2 94%   BMI 34.15 kg/m     Wt Readings from Last 3 Encounters:   03/13/19 (!) 238 lb (108 kg)   02/22/19 (!) 245 lb (111.1 kg)   02/14/19 (!) 244 lb 3.2 oz (110.8 kg)      GEN:   ALERT, NAD, ORIENTED TIMES THREE  LUNGS: CTA  COR: RRR WITHOUT MURMUR  EXT: WITHOUT EDEMA OR SWELLING    Recent Results (from the past 168 hour(s))   Basic metabolic panel    Collection Time: 03/08/19  3:39 PM   Result Value Ref Range    Sodium 138 136 - 145 mmol/L    Potassium 3.6 3.5 - 5.0 mmol/L    Chloride 102 98 - 107 mmol/L    CO2 25 22 - 31 mmol/L    Anion Gap, Calculation 11 5 - 18 mmol/L    Glucose 86 70 - 125 mg/dL    Calcium 10.0 8.5 - 10.5 mg/dL    BUN 18 8 - 28 mg/dL    Creatinine 0.91 0.70 - 1.30 mg/dL    GFR MDRD Af Amer >60 >60 mL/min/1.73m2    GFR MDRD Non Af Amer >60 >60 mL/min/1.73m2     Lab Results   Component Value Date    HGBA1C 7.1 (H) 02/07/2019     Lab Results   Component Value Date    CHOL 146 11/28/2018    CHOL 148 08/17/2017    CHOL 155 02/10/2016     Lab Results   Component Value Date "    HDL 30 (L) 11/28/2018    HDL 23 (L) 08/17/2017    HDL 26 (L) 02/10/2016     Lab Results   Component Value Date    LDLCALC 82 11/28/2018    LDLCALC 93 08/17/2017    LDLCALC 65 02/10/2016     Lab Results   Component Value Date    TRIG 170 (H) 11/28/2018    TRIG 159 (H) 08/17/2017    TRIG 320 (H) 02/10/2016     No components found for: CHOLHDL    No results found for: BEENA, [id number]

## 2021-06-24 NOTE — TELEPHONE ENCOUNTER
I notified pt already. Pt would like to know what are your recommendations. He would like to know if this was the case why Dr Alcala cancelled the appt with [facility] in the first place. Communicated to pt that I dont know the reason why but pt can be seen here in [facility] or at the [facility]. Pt stated understanding and would like provider's input. Communicated to pt that I will send a msg to the provider and I will give him a call back. Pt stated understanding and will wait for my call.    Please advise. Thank you!

## 2021-06-24 NOTE — TELEPHONE ENCOUNTER
OUTPATIENT PROGRESS NOTE  TRANSITIONAL [address] COMPLAINT  Transitional Care Management and Transitional Care Management [facility] Discharge Follow-Up)      Ms. Aubrie Galindo is unaccompanied today. SUBJECTIVE   The patient was discharged from the hospital on 2/3/18. The Discharge Summary was reviewed. It documents that the patient was hospitalized for multiple syncopal episodes precipitated by severe viral gastroenteritis causing weakness and dehydration. She did improve greatly with IV fluids. Her symptoms have all now completely resolved with no further diarrhea, nausea, vomiting, weakness, abdominal pain. She was found to have acute worsening of her chronic hyponatremia while hospitalized. She is going to follow up with her cardiologist as she did have some chest pain while hospitalized and stress test was equivocal.    Pertinent un-finalized hospital performed diagnostic tests - were reviewed. .    Pertinent un-finalized hospital lab tests - were reviewed. Advanced Directives:  Â· Patient doesn't have an Advance Directives document - document was given today    Durable Medical Equipment/Assistive devices prescribed: None     MEDICATIONS  The discharge medication list was reviewed. Outpatient medications were updated today. She is fully compliant with the medication regimen prescribed at the time of discharge. HISTORIES  I have personally reviewed and updated the following electronic medical record sections: Allergies, Problem List, Past Medical History and Past Surgical History. REVIEW OF SYSTEMS  Constitutional: Patient denies fever, chills, tiredness or malaise. Eyes: Denies change in visual acuity, pain, burning or itching. Immunologic: Denies hives, seasonal allergies. HENT: Denies sinus problems, ear infections, nasal congestion or sore throat. Respiratory: Denies cough, shortness of breath. Cardiovascular: Denies chest pain, edema. Recommendations relayed to pt.  Pt verbalized understanding and is appreciative.  Rx sent.  -rah    ----- Message -----  From: Edu Schmitz NP  Sent: 2/22/2019   2:27 PM  To: Yolanda Olson RN    No problem. We can keep him on Hyzaar (Losartan-HCTZ 100-12.5 mg )  and add HCTZ 12.5 mg daily  Thanks  CY   "  GI: Denies abdominal pain, nausea, vomiting, bloody stools or diarrhea. : Denies urine retention, painful urination, urinary frequency, blood in urine or nocturia. Musculoskeletal: Denies back pain, neck pain, joint pain or leg swelling. Integument: Denies rash, itching. Neurologic: Denies headache, focal weakness or sensory changes. Endocrine: Denies polyuria, polydipsia or temperature intolerance. Lymphatic: Denies swollen glands, weight loss. All other systems reviewed and negative. PHYSICAL EXAM  Visit Vitals  /74 (BP Location: [facility] [place], Patient Position: Sitting, Cuff Size: Regular)   Pulse 88   Temp 97.2 Â°F (36.2 Â°C) (Temporal Artery)   Ht 5' 7.5"" (1.715 m)   Wt 85.5 kg   SpO2 98%   BMI 29.10 kg/mÂ²     GENERAL:  The patient is alert and oriented x3, in no acute distress. HEENT:  Pupils are equally round and reactive to light. Extraocular muscles are intact. Oropharynx mucous membranes are moist, no erythema or edema, no exudate. NECK:  Supple with no lymphadenopathy, no masses. Normal range of motion. RESPIRATORY:  Respiratory effort is normal.  Lungs are clear to auscultation bilaterally with equal breath sounds. CARDIO:  Normal rate, regular rhythm. No murmurs, rubs or gallops. ABDOMEN:  Soft, nondistended, nontender. Normal bowel sounds in all 4 quadrants. No hepatosplenomegaly, no masses, no rebound or guarding. EXTREMITIES:  No edema. Warm and well perfused. 2+ pedal pulses. SKIN:  Warm and dry. No rashes. ASSESSMENT  1. Viral gastroenteritis    2. Syncope, unspecified syncope type    3. Hyponatremia    4. Chest pain, unspecified type        PLAN  Patient has now completely recovered from her viral gastroenteritis, severe dehydration, syncope and hyponatremia. She does have mild chronic hyponatremia likely from her SSRI that has been stable. We'll recheck a BMP today.   She is following up with cardiology regarding the equivocal stress " test.    Patient adherence to her treatment plan was assessed. She is   fully compliant with the entire discharge treatment plan. Patient was seen for a detailed history, detailed examination, medical decision making of moderate complexity .

## 2021-06-27 NOTE — PROGRESS NOTES
Progress Notes by Edu Schmitz NP at 2/22/2019  9:50 AM     Author: Edu Schmitz NP Service: -- Author Type: Nurse Practitioner    Filed: 2/22/2019 11:59 AM Encounter Date: 2/22/2019 Status: Signed    : Edu Schmitz NP (Nurse Practitioner)                 Click to link to [facility]       [facility] HEART CARE NOTE      Assessment/Recommendations   1. Coronary artery disease:recently admitted from February 7 - February 9, 2019 with epigastric pain and heartburn.  Abnormal stress test showing medium-sized area of ischemia in inferolateral of the left ventricle and an elevated troponin. Coronary angiogram on 2/8/2019 showed 70% stenosis in proximal RCA and 95% stenosis in mid RCA-lesions were successfully treated with arthrectomy and drug-eluting stents. Also noted to have disease in OM and diagonal but too small for intervention and therefore recommended medical comanagement. Dual antiplatelet therapy is being used with aspirin indefinitely and ticagrelor for 1 year. Dr. De Oliveira also recommended to switch to Plavix indefinitely after completion of Brilinta for 1 year if tolerated.  We discussed the importance of antiplatelet therapy and talking with his cardiologist prior to stopping these medications for any reason.    Patient denies any epigastric pain or heartburn but reports mild shortness of breath and exertion, lower extremity edema, and weight gain of 3 pounds since discharge from the hospital-denies PND or orthopnea.    Risk factor modification and lifestyle management topics were discussed including managing comorbidities, weight loss, heart healthy diet, exercise, stress reduction and alcohol use. Cardiac rehab has been ordered-we will discuss with Dr. Argueta if should hold given increase in aneurysm size on recent CTA. Instructed to avoid taking nitro with Cialis d/t potential severe drug interaction-verbalized understanding. Declined BNP check today    2.  Dyslipidemia:  Koko Ronquillo is on statin therapy with pravastatin 40 mg daily-dose was increased in the hospital but has been tolerating well. Most recent LDL is 170. We discussed a diet low in saturated fat, weight loss, and exercise along with medication for better control of cholesterol.     3.  Hypertension: His blood pressure is 150/76 and recheck was 140/80.  He is currently on Hyzaar (Losartan-HCTZ 100-12.5 mg daily).  Stopped Hyzaar and started on losartan 100 mg daily and increased hydrochlorothiazide from 12.5 mg to 25 mg daily.  Continue current dose of metoprolol succinate 100 mg daily.  Will repeat BMP in 10-14 days-order placed.    4.  Diabetes: Most recent A1C is 7.1.  We discussed the importance of tightly controlled blood sugars to minimize risk of worsening coronary artery disease. Defer to PCP for diabetes managment.    5.  Paroxysmal atrial fibrillation: HR in 70s and regular.  He is on Cartia 120 mg daily.  Not on anticoagulant therapy- potential drug interaction with RA meds and bleeding risk (per Dr. Anne 6/2017)    6.  Obstructive sleep apnea: nocturnal bradycardia during recent hospitalization. Recommended to follow-up with a sleep medicine.-referral placed.    7.  Abdominal aortic aneurysm s/p aortic bypass graft: recent CTA showed negative for  aortic dissection, patent aortic bypass graft, aneurysmal dilatation of infrarenal abdominal aorta measure up to 6.2 cm with moderate  increased from previous study.  Dr. Kim has recommended referral to vascular surgery with [facility]    Follow up with Dr. Argueta in 4-6 weeks     History of Present Illness    Koko Ronquillo is 73 y.o. with a significant past medical history of hypertension, hyperlipidemia, paroxysmal atrial fibrillation, diabetes type 2, abdominal aortic aneurysm with status post aortic bypass graft, rheumatoid arthritis, obesity, and recent diagnosis of coronary artery disease who is seen at [facility] for post coronary  intervention follow up.  Patient was recently admitted from February 7 - February 9, 2019 with epigastric pain and heartburn.  He had an abnormal stress test showing medium-sized area of ischemia in inferolateral of the left ventricle.  He also had an elevated troponin.  He subsequently underwent coronary angiogram on 2/8/2019 showed 70% stenosis in proximal RCA and 95% stenosis in mid RCA.  Lesions were successfully treated with arthrectomy and drug-eluting stents.  He was also noted to have disease in OM and diagonal but too small for intervention and therefore recommended medical comanagement.  Dual antiplatelet therapy is being used with aspirin indefinitely and ticagrelor for 1 year. Dr. De Oliveira also recommended to switch to Plavix indefinitely after completion of Brilinta for 1 year if tolerated.  His recent stress test showed ejection fraction of 62%.  Patient underwent CT abdomen showed negative for thoracoabdominal aortic dissection, patent aortic bypass graft, aneurysmal dilatation of infrarenal abdominal aorta measure up to 6.2 cm with moderate  increased from previous study.     Patient saw his PCP recently and was recommended to follow-up with the vascular surgery and [facility].    Today, patient reports no further epigastric or heartburn that he experienced with recent cardiac event.He denies lightheadedness, shortness of breath, orthopnea, PND, palpitations, chest pain and abdominal fullness/bloating.  He reports some fatigue, mild shortness of breath on exertion, lower extremity edema, and weight gain of 3 pounds since recent hospitalization.  He has not initiated his cardiac rehab.  Given changes in his aneurysm size, he wanted to check with the cardiologist prior to initiating his cardiac rehab.  He had a phone call visit with Christine Andujar and increased his metformin for diabetes management.    Coronary Angiogram- reviewed:  Results for orders placed during the hospital encounter of  02/07/19   Cardiac Catheterization [CATH01] 02/08/2019    Narrative Koko Ronquillo is a 73 y.o. old male with HTN, HL, DM who is here for   NSTEMI, abnormal stress test.    Findings:  LM:no obstruction  LAD:Ca2+ w/ 30% diffuse irregularity in proximal and mid-vessel. Small D2   w/ 90% ostial narrowing but JENNIFFER 3 flow  Lcx:large, Ca2+ OM1 w/ distal 90% lesion, not optimal for PCI or grafting  RCA:dominant, tortuous, heavily Ca2+ w/ diffuse 60-70% proximal, and   severe 95% mid-vessel disease    LVEDP:20    Access:  R Radial artery    PCI:  RCA was engaged w/ a 6F AL 0.75 Guide catheter and the lesion in RCA was   wired w/ a Rotofloppy wire, and proximal into mid-vessel were treated w/ 6   runs of rotational atherectomy w/ a 1.5mm jaimee at 941208 RPM. Wire was   exchanged for a Forte, over which vessel was pre-dilated w/ a 3.0x20mm NC   Emerge balloon, and after supporting w/ a 6F Guidezilla, a 3.5x38mm   Synergy EES in mid-, and a 3.5x28mm Synergy EES at 12 justo. Both stents   were then post-dilated w/ a 3.13i74co NC Emerge at 12 justo inflations.   Final angiography showed no dissection or perforation and a JENNIFFER 3 flow.    Closure:   Vasc Band    This is a complex modifier 22 case due to high risk anatomy, extensive   Ca2+, need for rotational atherectomy    Impression/plan:  Pt. is a 73 y.o. old male with HTN, HL, DM who is here for NSTEMI,   abnormal stress test.  - severe obstructive CAD in RCA s/p rotational atherectomy and EESx2, OM   and D branches that are not optimal for PCI   - would plan for medical management for OM and D disease due to small   size, JENNIFFER 3 flow  - ASA 81mg daily indefinitely, ticagrelor 180mg once, followed by 90mg   twice daily for at least 12 months, and if tolerated would switch to   clopidogrel after 12 mos to continue indefinitely  - aggressive statin therapy, DM control, low threshold for PCS K9   inhibitors depending on response  - continue aggressive risk factor modification      Pharmacology stress test on 2/7/2019-reviewed  Conclusion     The left ventricular ejection fraction is 62%.    The pharmacologic nuclear stress test is abnormal.    The patient is at an intermediate risk of future cardiac ischemic events.    There is a medium sized area of ischemia in the inferolateral segment(s) of the left ventricle. There is a small area of nontransmural infarction in the inferolateral segment(s) of the left ventricle.     CTA-Abdomen and Pelvis: 2/7/19-reviewed  IMPRESSION:   CONCLUSION:  1.  Negative for thoracoabdominal aortic dissection. There is a previous distal abdominal aortic bypass graft which is patent.     2.  Stable aneurysmal dilatation of the distal descending thoracic aorta. Aneurysmal dilatation of the infrarenal abdominal aorta measures up to 6.2 cm, modestly increased from comparison study.     3.  Moderate apical predominant centrilobular emphysema. No evidence of pneumonia.     4.  Multivessel coronary artery disease.     5.  Mild hepatic steatosis.     6.  Previous ventral herniorrhaphy. Multiple ventral hernias remain, 2 of which contain nonobstructed bowel.    ECHO-Reviewed:   Results for orders placed during the hospital encounter of 05/04/16   Echo Complete [ECH10] 05/04/2016    Narrative  Conclusions    Summary   Left ventricular size is mildly increased.   No regional wall motion abnormalities.   Left ventricular ejection fraction is visually estimated to be 65%.   No significant valvular abnormalities.   No pericardial effusion.   Mild dilation of the aortic root, which measures 4 cm in diameter.        Electronically signed by ESTEFANY HO MD(Interpreting   physician) on 05/04/2016 04:01 PM              Physical Examination Review of Systems   Vitals:    02/22/19 1031   BP: 140/80   Pulse: 72   Resp:      Body mass index is 35.15 kg/m .  Wt Readings from Last 3 Encounters:   02/22/19 (!) 245 lb (111.1 kg)   02/14/19 (!) 244 lb 3.2 oz (110.8 kg)   02/12/19 (!)  242 lb 9.6 oz (110 kg)       General Appearance:     Alert, cooperative and in no acute distress.   ENT/Mouth: membranes moist, no oral lesions or bleeding gums.      EYES:  no scleral icterus, normal conjunctivae   Neck: no carotid bruits or thyromegaly   Chest/Lungs:   lungs are clear to auscultation, no rales or wheezing, respirations unlabored   Cardiovascular:    Hear rate regular. Normal first and second heart sounds with no murmurs, rubs, or gallops; the carotid, radial and posterior tibial pulses are intact, Jugular venous pressure flat with no HJR, mild ankle edema bilateral lower extremities    Abdomen:  Large but soft, nontender, nondistended, bowel sounds present   Extremities: no cyanosis or clubbing   Skin:  Neurologic: warm, dry.  mood and affect are appropriate, alert and oriented x3     Puncture Site: Right radial site is soft and intact.  Radial pulses and Pedal pulses intact and symmetrical.  CMS intact.      General: WNL  Eyes: WNL  Ears/Nose/Throat: WNL  Lungs: Cough, Shortness of Breath  Heart: WNL  Stomach: WNL  Bladder: WNL  Muscle/Joints: WNL  Skin: WNL  Nervous System: WNL  Mental Health: WNL     Blood: WNL     Medical History  Surgical History Family History Social History   Past Medical History:   Diagnosis Date   ? Anxiety    ? Atrial fibrillation (H)    ? Chest pain made worse by breathing 4/21/2016   ? CIS (carcinoma in situ of bladder) 8/17/2017   ? Fatigue 5/19/2016   ? Hematuria 3/1/2017   ? HLD (hyperlipidemia)    ? HTN (hypertension)    ? Psoriasis    ? RA (rheumatoid arthritis) (H)    ? Sleep apnea     does not use CPAP    Past Surgical History:   Procedure Laterality Date   ? ABDOMINAL AORTIC ANEURYSM REPAIR  1995   ? aortic bypass     ? CV CORONARY ANGIOGRAM N/A 2/8/2019    Procedure: Coronary Angiogram;  Surgeon: El De Oliveira MD;  Location: [facility] Cath Lab;  Service: Cardiology   ? HERNIA REPAIR      with nesh   ? nasal cauterization     ? TRANSURETHRAL RESECTION OF  BLADDER TUMOR N/A 3/1/2017    Procedure: CYSTOSCOPY BLADDER BIOPSY AND FULGURATION;  Surgeon: Kostas Smith MD;  [place] [facility] [place]  Service:     Family History   Problem Relation Age of Onset   ? Acute Myocardial Infarction Father 58    Social History     Socioeconomic History   ? Marital status:      Spouse name: Not on file   ? Number of children: Not on file   ? Years of education: Not on file   ? Highest education level: Not on file   Occupational History   ? Not on file   Social Needs   ? Financial resource strain: Not on file   ? Food insecurity:     Worry: Not on file     Inability: Not on file   ? Transportation needs:     Medical: Not on file     Non-medical: Not on file   Tobacco Use   ? Smoking status: Former Smoker     Last attempt to quit: 2007     Years since quittin.9   ? Smokeless tobacco: Never Used   Substance and Sexual Activity   ? Alcohol use: Yes     Comment: occasional   ? Drug use: No   ? Sexual activity: Not on file   Lifestyle   ? Physical activity:     Days per week: Not on file     Minutes per session: Not on file   ? Stress: Not on file   Relationships   ? Social connections:     Talks on phone: Not on file     Gets together: Not on file     Attends Mosque service: Not on file     Active member of club or organization: Not on file     Attends meetings of clubs or organizations: Not on file     Relationship status: Not on file   ? Intimate partner violence:     Fear of current or ex partner: Not on file     Emotionally abused: Not on file     Physically abused: Not on file     Forced sexual activity: Not on file   Other Topics Concern   ? Not on file   Social History Narrative   ? Not on file          Medications  Allergies   Current Outpatient Medications   Medication Sig Dispense Refill   ? aspirin 81 MG EC tablet Take 81 mg by mouth daily.     ? betamethasone dipropionate (DIPROLENE) 0.05 % ointment APPLY TO AFFECTED AREA SPARINGLY TWICE DAILY  (Patient taking differently: APPLY TO AFFECTED AREA SPARINGLY TWICE DAILY AS NEEDED) 45 g 0   ? CARTIA  mg 24 hr capsule TAKE 1 CAPSULE BY MOUTH EVERY DAY 90 capsule 3   ? ciclopirox 0.77 % gel Apply topically as needed.            ? CLOBETASOL PROPIONATE, BULK, MISC Use 1 application As Directed as needed.     ? folic acid (FOLVITE) 1 MG tablet Take 1 tablet by mouth every day. Do not take the day you take methotrexate        4   ? hydrocortisone 2.5 % cream Apply 1 application topically as needed.           ? ketoconazole (NIZORAL) 2 % shampoo Apply topically as needed.     ? metFORMIN (GLUCOPHAGE) 500 MG tablet Take 1 tablet (500 mg total) by mouth 2 (two) times a day with meals. (Patient taking differently: Take 500 mg by mouth 2 (two) times a day with meals.       ) 60 tablet 0   ? methotrexate 2.5 MG tablet Take 12.5 mg by mouth once a week. On Tuesdays           ? metoprolol succinate (TOPROL-XL) 100 MG 24 hr tablet TAKE 1 TABLET(100 MG) BY MOUTH DAILY 30 tablet 6   ? multivitamin therapeutic tablet Take 1 tablet by mouth daily.     ? pravastatin (PRAVACHOL) 40 MG tablet Take 1 tablet (40 mg total) by mouth at bedtime. 90 tablet 1   ? tadalafil (CIALIS) 20 MG tablet Take as directed.     ? ticagrelor (BRILINTA) 90 mg Tab Take 1 tablet (90 mg total) by mouth 2 (two) times a day. 180 tablet 3   ? hydroCHLOROthiazide (HYDRODIURIL) 25 MG tablet Take 1 tablet (25 mg total) by mouth daily. 30 tablet 11   ? losartan (COZAAR) 100 MG tablet Take 1 tablet (100 mg total) by mouth daily. 30 tablet 11   ? nitroglycerin (NITROSTAT) 0.4 MG SL tablet Place 1 tablet (0.4 mg total) under the tongue every 5 (five) minutes as needed for chest pain. 20 tablet 1   ? tamsulosin (FLOMAX) 0.4 mg cap TAKE 1 CAPSULE(0.4 MG) BY MOUTH TWICE DAILY 180 capsule 2     No current facility-administered medications for this visit.       No Known Allergies      Lab Results    Chemistry CBC BNP   Lab Results   Component Value Date     CREATININE 1.02 02/12/2019    BUN 22 02/12/2019     02/12/2019    K 4.0 02/12/2019     02/12/2019    CO2 22 02/12/2019     Creatinine (mg/dL)   Date Value   02/12/2019 1.02   02/09/2019 1.03   02/07/2019 0.93   02/06/2019 1.15    Lab Results   Component Value Date    WBC 8.2 02/06/2019    HGB 13.0 (L) 02/09/2019    HCT 40.4 02/06/2019    MCV 97 02/06/2019     02/06/2019    Lab Results   Component Value Date    BNP 42 04/20/2016     BNP (pg/mL)   Date Value   04/20/2016 42        Edu Schmitz CNP  [facility]        This note has been dictated using voice recognition software. Any grammatical, typographical, or context distortions are unintentional and inherent to the software

## 2021-06-27 NOTE — PROGRESS NOTES
Progress Notes by Dillan Kim MD at 2/12/2019 10:50 AM     Author: Dillan Kim MD Service: -- Author Type: Physician    Filed: 2/12/2019 11:46 AM Encounter Date: 2/12/2019 Status: Signed    : Dillan Kim MD (Physician)       DIAGNOSIS:  1. Type 2 diabetes mellitus treated without insulin (H)  Ambulatory referral to Diabetic Education Program   2. Hypertension  Basic Metabolic Panel   3. Coronary artery disease due to lipid rich plaque     4. Abdominal aortic aneurysm (AAA) without rupture (H)  Ambulatory referral to Vascular Surgery       PLAN:  Patient Instructions   Refer to diabetic education    Refer to Vascular Surgery    Begin checking blood sugars twice daily and bring into your diabetic education appt.    Check BMP (due to recent angiogram as well as start on metformin)     One month follow up    meds reviewed and reconciled                HPI:  Hospital stay 7-9 Feb 2019 at [facility] during which time he had two stents placed in the RCA.  Is on Brillinta for a year (along with aspirin).  Has been tired since leaving the hospital.  No chest pain.  Gets a little shortness of breath laying down.  Dx with diabetes in the hospital.  Quit smoking 10-12 years ago.             Current Outpatient Medications on File Prior to Visit   Medication Sig Dispense Refill   ? aspirin 81 MG EC tablet Take 81 mg by mouth daily.     ? betamethasone dipropionate (DIPROLENE) 0.05 % ointment APPLY TO AFFECTED AREA SPARINGLY TWICE DAILY (Patient taking differently: APPLY TO AFFECTED AREA SPARINGLY TWICE DAILY AS NEEDED) 45 g 0   ? CARTIA  mg 24 hr capsule TAKE 1 CAPSULE BY MOUTH EVERY DAY 90 capsule 3   ? ciclopirox 0.77 % gel Apply topically as needed.            ? folic acid (FOLVITE) 1 MG tablet Take 1 tablet by mouth every day. Do not take the day you take methotrexate        4   ? hydrocortisone 2.5 % cream Apply topically as needed.            ? ketoconazole (NIZORAL) 2 % shampoo  Apply topically as needed.     ? losartan-hydrochlorothiazide (HYZAAR) 100-12.5 mg per tablet TAKE 1 TABLET BY MOUTH EVERY DAY 90 tablet 2   ? metFORMIN (GLUCOPHAGE) 500 MG tablet Take 1 tablet (500 mg total) by mouth 2 (two) times a day with meals. 60 tablet 0   ? methotrexate 2.5 MG tablet Take 12.5 mg by mouth once a week. On Tuesdays           ? metoprolol succinate (TOPROL-XL) 100 MG 24 hr tablet TAKE 1 TABLET(100 MG) BY MOUTH DAILY 30 tablet 6   ? multivitamin therapeutic tablet Take 1 tablet by mouth daily.     ? pravastatin (PRAVACHOL) 40 MG tablet Take 1 tablet (40 mg total) by mouth at bedtime. 30 tablet 0   ? tadalafil (CIALIS) 20 MG tablet Take as directed.     ? tamsulosin (FLOMAX) 0.4 mg cap TAKE 1 CAPSULE(0.4 MG) BY MOUTH TWICE DAILY 180 capsule 2   ? ticagrelor (BRILINTA) 90 mg Tab Take 1 tablet (90 mg total) by mouth 2 (two) times a day. 60 tablet 1     No current facility-administered medications on file prior to visit.        Pmh: reviewed  Psh: reviewed  Allergy:  reviewed      EXAM:    /79   Pulse 72   Temp 96.9  F (36.1  C) (Oral)   Resp 20   Wt (!) 242 lb 9.6 oz (110 kg)   BMI 34.81 kg/m    GEN:   ALERT, NAD, ORIENTED TIMES THREE  NECK: SUPPLE WITHOUT ADENOPATHY OR THYROMEGALY  LUNGS: CTA  COR: RRR WITHOUT MURMUR  SKIN: NO RASH , ULCERS OR LESIONS NOTED  EXT: WITHOUT EDEMA OR SWELLING    Lab Results   Component Value Date    HGBA1C 7.1 (H) 02/07/2019     Lab Results   Component Value Date    CHOL 146 11/28/2018    CHOL 148 08/17/2017    CHOL 155 02/10/2016     Lab Results   Component Value Date    HDL 30 (L) 11/28/2018    HDL 23 (L) 08/17/2017    HDL 26 (L) 02/10/2016     Lab Results   Component Value Date    LDLCALC 82 11/28/2018    LDLCALC 93 08/17/2017    LDLCALC 65 02/10/2016     Lab Results   Component Value Date    TRIG 170 (H) 11/28/2018    TRIG 159 (H) 08/17/2017    TRIG 320 (H) 02/10/2016     No components found for: CHOLHDL        Recent Results (from the past 168 hour(s))    ECG 12 lead nursing unit performed    Collection Time: 02/06/19 10:35 PM   Result Value Ref Range    SYSTOLIC BLOOD PRESSURE 133 mmHg    DIASTOLIC BLOOD PRESSURE 71 mmHg    VENTRICULAR RATE 95 BPM    ATRIAL RATE 95 BPM    P-R INTERVAL 186 ms    QRS DURATION 84 ms    Q-T INTERVAL 374 ms    QTC CALCULATION (BEZET) 469 ms    P Axis 36 degrees    R AXIS 19 degrees    T AXIS 19 degrees    MUSE DIAGNOSIS       Normal sinus rhythm  Septal infarct , age undetermined  Abnormal ECG  When compared with ECG of 24-FEB-2017 13:46,  Vent. rate has increased BY  43 BPM  Criteria for Septal infarct is now Present  Confirmed by CIERRA ADAIR, MAGO LOC: ([id number]) on 2/7/2019 3:54:30 PM     Comprehensive Metabolic Panel    Collection Time: 02/06/19 11:07 PM   Result Value Ref Range    Sodium 135 (L) 136 - 145 mmol/L    Potassium 4.2 3.5 - 5.0 mmol/L    Chloride 98 98 - 107 mmol/L    CO2 25 22 - 31 mmol/L    Anion Gap, Calculation 12 5 - 18 mmol/L    Glucose 310 (H) 70 - 125 mg/dL    BUN 17 8 - 28 mg/dL    Creatinine 1.15 0.70 - 1.30 mg/dL    GFR MDRD Af Amer >60 >60 mL/min/1.73m2    GFR MDRD Non Af Amer >60 >60 mL/min/1.73m2    Bilirubin, Total 0.3 0.0 - 1.0 mg/dL    Calcium 9.7 8.5 - 10.5 mg/dL    Protein, Total 7.8 6.0 - 8.0 g/dL    Albumin 3.7 3.5 - 5.0 g/dL    Alkaline Phosphatase 81 45 - 120 U/L    AST 41 (H) 0 - 40 U/L    ALT 59 (H) 0 - 45 U/L   Lipase    Collection Time: 02/06/19 11:07 PM   Result Value Ref Range    Lipase 49 0 - 52 U/L   Troponin I    Collection Time: 02/06/19 11:07 PM   Result Value Ref Range    Troponin I 0.02 0.00 - 0.29 ng/mL   HM1 (CBC with Diff)    Collection Time: 02/06/19 11:07 PM   Result Value Ref Range    WBC 8.2 4.0 - 11.0 thou/uL    RBC 4.15 (L) 4.40 - 6.20 mill/uL    Hemoglobin 14.2 14.0 - 18.0 g/dL    Hematocrit 40.4 40.0 - 54.0 %    MCV 97 80 - 100 fL    MCH 34.2 (H) 27.0 - 34.0 pg    MCHC 35.1 32.0 - 36.0 g/dL    RDW 13.6 11.0 - 14.5 %    Platelets 209 140 - 440 thou/uL    MPV 10.4 8.5 - 12.5  fL    Neutrophils % 72 (H) 50 - 70 %    Lymphocytes % 16 (L) 20 - 40 %    Monocytes % 8 2 - 10 %    Eosinophils % 3 0 - 6 %    Basophils % 1 0 - 2 %    Neutrophils Absolute 5.8 2.0 - 7.7 thou/uL    Lymphocytes Absolute 1.3 0.8 - 4.4 thou/uL    Monocytes Absolute 0.6 0.0 - 0.9 thou/uL    Eosinophils Absolute 0.2 0.0 - 0.4 thou/uL    Basophils Absolute 0.1 0.0 - 0.2 thou/uL   POCT Glucose    Collection Time: 02/07/19  1:17 AM   Result Value Ref Range    Glucose 292 (H) 70 - 139 mg/dL   Troponin I    Collection Time: 02/07/19  3:55 AM   Result Value Ref Range    Troponin I 0.18 0.00 - 0.29 ng/mL   Glycosylated Hemoglobin A1C    Collection Time: 02/07/19  3:55 AM   Result Value Ref Range    Hemoglobin A1c 7.1 (H) 4.2 - 6.1 %   POCT Glucose - 4 times daily before meals and at bedtime    Collection Time: 02/07/19  7:44 AM   Result Value Ref Range    Glucose 173 (H) 70 - 139 mg/dL   NM Pharmacologic Stress Test    Collection Time: 02/07/19 11:38 AM   Result Value Ref Range    Pharmacologic Protocol  Lexiscan     Test Type Pharmacological     Baseline HR 65     Baseline /75     Last Stress HR 70     Last Stress /73     PERCENT HR 85%     ST Deviation Elevation V3 0.9mm     Deviation Time V3 -1.9mm     ST Elevation Amount V3 1.9mm     ST Deviation Amount he V3 -0.9mm     Final Resting /77     Final Resting HR 63     Max Treadmill Speed 0.0     Max Treadmill Grade 0.0     Peak Systolic /85     Peak Diastolic /85     Max HR 83     Stress Phase Resting     Stress Resting Pt Position MANUAL EVENT     Current HR 61     Current /75     Stress Phase Stress     Stage Minute EXE 00:00     Exercise Stage STAGE 2     Current HR 69     Current /75     Stress Phase Stress     Stage Minute EXE 01:00     Exercise Stage STAGE 3     Current HR 58     Current /75     Stress Phase Stress     Stage Minute EXE 01:01     Exercise Stage STAGE 3     Current HR 58     Current /85     Stress Phase  Stress     Stage Minute EXE 02:00     Exercise Stage STAGE 4     Current HR 82     Current /85     Stress Phase Stress     Stage Minute EXE 02:07     Exercise Stage STAGE 4     Current HR 82     Current /73     Stress Phase Stress     Stage Minute EXE 03:00     Exercise Stage STAGE 5     Current HR 78     Current /73     Stress Phase Stress     Stage Minute EXE 04:00     Exercise Stage STAGE 6     Current HR 70     Current /73     Stress Phase Stress     Stage Minute EXE 04:00     Exercise Stage STAGE 6     Current HR 70     Current /73     Stress Phase Recovery     Stage Minute REC 00:08     Exercise Stage Recovery     Current HR 69     Current /77     Stress Phase Recovery     Stage Minute REC 00:59     Exercise Stage Recovery     Current HR 65     Current /77     Stress Phase Recovery     Stage Minute REC 01:01     Exercise Stage Recovery     Current HR 66     Current /77     Stress Phase Recovery     Stage Minute REC 01:59     Exercise Stage Recovery     Current HR 71     Current /77     Stress Phase Recovery     Stage Minute REC 02:59     Exercise Stage Recovery     Current HR 65     Current /77     Stress Phase Recovery     Stage Minute REC 03:59     Exercise Stage Recovery     Current HR 63     Current /77     Stress Phase Recovery     Stage Minute REC 04:00     Exercise Stage Recovery     Current HR 63     Current /77     Calculated Percent HR 57 %    Left Ventricular EF 62 %   POCT Glucose - 4 times daily before meals and at bedtime    Collection Time: 02/07/19 11:59 AM   Result Value Ref Range    Glucose 212 (H) 70 - 139 mg/dL   Troponin I    Collection Time: 02/07/19 12:51 PM   Result Value Ref Range    Troponin I 0.35 (HH) 0.00 - 0.29 ng/mL   Comprehensive Metabolic Panel    Collection Time: 02/07/19 12:51 PM   Result Value Ref Range    Sodium 135 (L) 136 - 145 mmol/L    Potassium 4.1 3.5 - 5.0 mmol/L    Chloride 103 98 - 107 mmol/L     CO2 26 22 - 31 mmol/L    Anion Gap, Calculation 6 5 - 18 mmol/L    Glucose 172 (H) 70 - 125 mg/dL    BUN 14 8 - 28 mg/dL    Creatinine 0.93 0.70 - 1.30 mg/dL    GFR MDRD Af Amer >60 >60 mL/min/1.73m2    GFR MDRD Non Af Amer >60 >60 mL/min/1.73m2    Bilirubin, Total 0.5 0.0 - 1.0 mg/dL    Calcium 9.3 8.5 - 10.5 mg/dL    Protein, Total 7.1 6.0 - 8.0 g/dL    Albumin 3.5 3.5 - 5.0 g/dL    Alkaline Phosphatase 77 45 - 120 U/L    AST 37 0 - 40 U/L    ALT 66 (H) 0 - 45 U/L   Troponin I    Collection Time: 02/07/19  2:28 PM   Result Value Ref Range    Troponin I 0.33 (HH) 0.00 - 0.29 ng/mL   Protime-INR    Collection Time: 02/07/19  2:28 PM   Result Value Ref Range    INR 1.04 0.90 - 1.10   aPTT    Collection Time: 02/07/19  2:28 PM   Result Value Ref Range    PTT 27 24 - 37 seconds   POCT Glucose - 4 times daily before meals and at bedtime    Collection Time: 02/07/19  4:57 PM   Result Value Ref Range    Glucose 115 70 - 139 mg/dL   Anti-Xa Heparin Level    Collection Time: 02/07/19  8:17 PM   Result Value Ref Range    Anti-Xa Heparin Assay 0.14 (L) 0.30 - 0.70 IU/mL   POCT Glucose - 4 times daily before meals and at bedtime    Collection Time: 02/07/19  8:49 PM   Result Value Ref Range    Glucose 145 (H) 70 - 139 mg/dL   Anti-Xa Heparin Level    Collection Time: 02/08/19  3:03 AM   Result Value Ref Range    Anti-Xa Heparin Assay 0.32 0.30 - 0.70 IU/mL   POCT Glucose - 4 times daily before meals and at bedtime    Collection Time: 02/08/19  8:38 AM   Result Value Ref Range    Glucose 155 (H) 70 - 139 mg/dL   Anti-Xa Heparin Level    Collection Time: 02/08/19 10:19 AM   Result Value Ref Range    Anti-Xa Heparin Assay 0.21 (L) 0.30 - 0.70 IU/mL   POCT activated clotting time    Collection Time: 02/08/19  1:13 PM   Result Value Ref Range    Activated Clotting Time  105 - 167 sec   POCT activated clotting time    Collection Time: 02/08/19  1:29 PM   Result Value Ref Range    Activated Clotting Time  105 - 167  sec   POCT activated clotting time    Collection Time: 02/08/19  2:07 PM   Result Value Ref Range    Activated Clotting Time  105 - 167 sec   POCT Glucose - 4 times daily before meals and at bedtime    Collection Time: 02/08/19  4:20 PM   Result Value Ref Range    Glucose 115 70 - 139 mg/dL   ECG 12 lead    Collection Time: 02/08/19  6:18 PM   Result Value Ref Range    SYSTOLIC BLOOD PRESSURE  mmHg    DIASTOLIC BLOOD PRESSURE  mmHg    VENTRICULAR RATE 54 BPM    ATRIAL RATE 54 BPM    P-R INTERVAL 178 ms    QRS DURATION 86 ms    Q-T INTERVAL 472 ms    QTC CALCULATION (BEZET) 447 ms    P Axis 44 degrees    R AXIS 6 degrees    T AXIS 8 degrees    MUSE DIAGNOSIS       Sinus bradycardia  Otherwise normal ECG  When compared with ECG of 06-FEB-2019 22:35,  Vent. rate has decreased BY  41 BPM  Criteria for Septal infarct are no longer Present  Confirmed by ALISE ADAIR, LES LOC: ([id number]) on 2/9/2019 5:19:44 PM     POCT Glucose - 4 times daily before meals and at bedtime    Collection Time: 02/08/19  8:49 PM   Result Value Ref Range    Glucose 137 70 - 139 mg/dL   Basic metabolic panel    Collection Time: 02/09/19  6:25 AM   Result Value Ref Range    Sodium 136 136 - 145 mmol/L    Potassium 3.5 3.5 - 5.0 mmol/L    Chloride 104 98 - 107 mmol/L    CO2 25 22 - 31 mmol/L    Anion Gap, Calculation 7 5 - 18 mmol/L    Glucose 134 (H) 70 - 125 mg/dL    Calcium 9.2 8.5 - 10.5 mg/dL    BUN 16 8 - 28 mg/dL    Creatinine 1.03 0.70 - 1.30 mg/dL    GFR MDRD Af Amer >60 >60 mL/min/1.73m2    GFR MDRD Non Af Amer >60 >60 mL/min/1.73m2   Hemoglobin in AM    Collection Time: 02/09/19  6:25 AM   Result Value Ref Range    Hemoglobin 13.0 (L) 14.0 - 18.0 g/dL   ECG 12 lead - in AM    Collection Time: 02/09/19  6:55 AM   Result Value Ref Range    SYSTOLIC BLOOD PRESSURE  mmHg    DIASTOLIC BLOOD PRESSURE  mmHg    VENTRICULAR RATE 54 BPM    ATRIAL RATE 54 BPM    P-R INTERVAL 178 ms    QRS DURATION 88 ms    Q-T INTERVAL 464 ms    QTC  CALCULATION (BEZET) 440 ms    P Axis 55 degrees    R AXIS 10 degrees    T AXIS 19 degrees    MUSE DIAGNOSIS       Sinus bradycardia  Possible Septal infarct , age undetermined  Nonspecific ST abnormality  Abnormal ECG  When compared with ECG of 08-FEB-2019 18:18,  Septal infarct is now Present  Confirmed by JIMMY CHAVIRA MD LOC:JN ([id number]) on 2/9/2019 5:24:33 PM     POCT Glucose - 4 times daily before meals and at bedtime    Collection Time: 02/09/19  8:28 AM   Result Value Ref Range    Glucose 151 (H) 70 - 139 mg/dL   ECG 12 lead MUSE    Collection Time: 02/09/19  9:01 AM   Result Value Ref Range    SYSTOLIC BLOOD PRESSURE  mmHg    DIASTOLIC BLOOD PRESSURE  mmHg    VENTRICULAR RATE 65 BPM    ATRIAL RATE 65 BPM    P-R INTERVAL 182 ms    QRS DURATION 84 ms    Q-T INTERVAL 428 ms    QTC CALCULATION (BEZET) 445 ms    P Axis 54 degrees    R AXIS 14 degrees    T AXIS 25 degrees    MUSE DIAGNOSIS       Normal sinus rhythm  Possible Septal infarct (cited on or before 09-FEB-2019)  Abnormal ECG  When compared with ECG of 09-FEB-2019 06:55,  No significant change was found  Confirmed by JIMMY CHAVIRA MD LOC:JN ([id number]) on 2/9/2019 5:18:16 PM     POCT Glucose - 4 times daily before meals and at bedtime    Collection Time: 02/09/19 12:00 PM   Result Value Ref Range    Glucose 119 70 - 139 mg/dL          [facility] MEDICINE  DISCHARGE SUMMARY      Primary Care Physician: Dlilan Kim MD                                                                         Admission Date: 2/7/2019   Discharge Provider: Angelo Gonzalez Discharge Date: 2/9/2019   Diet: diabetic diet    Code Status: Full Code   Activity: activity as tolerated           Condition at Discharge: Good      REASON FOR PRESENTATION(See Admission Note for Details)   Chest pain      PRINCIPAL & ACTIVE DISCHARGE DIAGNOSES      Principal Problem:    Acute chest pain s/p coronary angio and stent placement   Active Problems:    Hyperlipidemia     Hypertension    Pre-diabetes    Type 2 diabetes mellitus treated without insulin (H)    Positive cardiac stress test    Troponin level elevated  Body mass index is 34.69 kg/m .        SIGNIFICANT FINDINGS (Imaging, labs):   Nm Pharmacologic Stress Test     Result Date: 2/7/2019    The left ventricular ejection fraction is 62%.   The pharmacologic nuclear stress test is abnormal.   The patient is at an intermediate risk of future cardiac ischemic events.   There is a medium sized area of ischemia in the inferolateral segment(s) of the left ventricle. There is a small area of nontransmural infarction in the inferolateral segment(s) of the left ventricle.       Cta Chest Abdomen Pelvis     Result Date: 2/7/2019  CTA CHEST ABDOMEN PELVIS 2/7/2019 12:25 AM INDICATION: Aortic dz, non-traumatic, known or suspected chest and abd pain.  h/o aortic surgery. Evaluate for aortic dissection. TECHNIQUE: Multiphase helical acquisition through the chest, abdomen, and pelvis was performed including noncontrast, arterial phase, and delayed images before and after the administration of IV contrast. 2D and 3D reconstructions were performed by the CT technologist. Dose reduction techniques were used. IV CONTRAST: Iohexol (Omni) 100 mL COMPARISON: CT from 2/17/2017 and 4/20/2016. FINDINGS: CT ANGIOGRAM CHEST, ABDOMEN, AND PELVIS: Diffusely ectatic, atherosclerotic descending thoracic and abdominal aorta. The ascending thoracic aorta is ectatic, with a diameter of 3.8 cm. The descending thoracic aorta is aneurysmal, measuring up to 4.1 cm at the pulmonary artery bifurcation, and measuring 4.4 cm just distal to this. This is not significantly changed compared to 4/20/2016. The aorta measures 4.6 cm just beyond the diaphragmatic robert. Infrarenal aorta measures up to 5.9 x 6.2 cm on image 75  series 3. In the same plane on 2/17/2017 this measured up to 5.9 x 5.8 cm. There is no hyperdense acute aortic intramural hematoma. There is no  dissection. There is an aortobiiliac bypass graft which is patent.  There is a large amount of eccentric mural thrombus in the lower abdominal aorta. The celiac, superior mesenteric, and bilateral renal arteries are atherosclerotic but patent. There is a three-vessel aortic arch with tortuous, atherosclerotic arch vessels which are patent. No central pulmonary embolism. The iliac and visualized proximal femoral arteries are patent. CHEST: LUNGS AND PLEURA: Moderate apical predominant centrilobular emphysematous changes. No focal airspace consolidation. No edema. No pneumothorax or pleural effusion. MEDIASTINUM: Heart size is normal. Multivessel coronary artery disease. No pericardial effusion. No adenopathy. ABDOMEN: Contracted gallbladder. Hepatic steatosis. Normal spleen, adrenals, and pancreas. Stable right lower pole parapelvic renal cyst. There are also smaller cortical cysts on the left which are stable. No hydronephrosis. No mechanical bowel obstruction. There has been a previous ventral herniorrhaphy, although there are multiple ventral abdominal wall hernias. There is a supraumbilical ventral hernia containing a short segment of nonobstructed transverse colon on image 124 of series 5. There is a larger hernia sac containing small bowel both inferior and insinuating ventrally to the patient's hernia mesh. There is no obstruction at this point either. There is no free air. No focal bowel wall thickening. The appendix is  normal. PELVIS: Normal bladder. No free fluid or adenopathy. MUSCULOSKELETAL: Multilevel thoracic and lumbar spine degenerative disc changes.      CONCLUSION: 1.  Negative for thoracoabdominal aortic dissection. There is a previous distal abdominal aortic bypass graft which is patent. 2.  Stable aneurysmal dilatation of the distal descending thoracic aorta. Aneurysmal dilatation of the infrarenal abdominal aorta measures up to 6.2 cm, modestly increased from comparison study. 3.  Moderate  apical predominant centrilobular emphysema. No evidence of pneumonia. 4.  Multivessel coronary artery disease. 5.  Mild hepatic steatosis. 6.  Previous ventral herniorrhaphy. Multiple ventral hernias remain, 2 of which contain nonobstructed bowel.        PENDING LABS         PROCEDURES ( this hospitalization only)       Coronary Angiogram, Percutaneous Coronary Intervention, Rotablator Atherectomy Coronary     RECOMMENDATIONS TO OUTPATIENT PROVIDER FOR F/U VISIT      PCP  Cardiology      DISPOSITION      Home     SUMMARY OF HOSPITAL COURSE:       73 y.o. old male with history of distal abdominal aortic bypass graft, ventral herniorrhaphy, hypertension, prediabetes, dyslipidemia, paroxysmal atrial fibrillation, rheumatoid arthritis, psoriasis who presented to [facility] emergency department for evaluation of epigastric pain.  CT was done negative for any aortic dissection he was sent to [facility] due to bed availability here his troponin bumped up, started on heparin infusion cardiology was consulted, stress test was also abnormal, patient underwent coronary angiogram found to have severe obstructive coronary disease and RCA status post rotational atherectomy and RANCHO x2 OM and deep branches that are not optimal for PCI cardiology recommending aspirin 81 daily indefinitely Brilinta 90 mg twice daily for at least 12-month and if tolerated switch to Plavix after 12 months to continue indefinitely aggressive statin therapy low threshold for PC SK 9 inhibitor.  Last hemoglobin A1c 7.1 from 2/7/2019 patient has been started on metformin he will follow closely with primary care physician and cardiologist as an outpatient. D/w cardiology about low HR nighttime most likely from obstructive sleep apnea no need to adjust cardiac medication at this time sleep study recommended  Patient feels comfortable going home at the time of discharge vitals stable condition improved significantly     Patient will need  outpatient sleep study I will defer to primary care physician for ordering it              Discharge Medications with Med changes:               Medication List        START taking these medications    metFORMIN 500 MG tablet  Quantity:  60 tablet  Dose:  500 mg  Commonly known as:  GLUCOPHAGE  500 mg, Oral, 2 times daily with meals      ticagrelor 90 mg Tab  Quantity:  60 tablet  Dose:  90 mg  Commonly known as:  BRILINTA  90 mg, Oral, 2 times daily          CHANGE how you take these medications    betamethasone dipropionate 0.05 % ointment  Quantity:  45 g  Commonly known as:  DIPROLENE  APPLY TO AFFECTED AREA SPARINGLY TWICE DAILY  What changed:  See the new instructions.      pravastatin 40 MG tablet  Quantity:  30 tablet  Dose:  40 mg  Commonly known as:  PRAVACHOL  40 mg, Oral, Bedtime  What changed:      medication strength    See the new instructions.          CONTINUE taking these medications    aspirin 81 MG EC tablet  Dose:  81 mg  81 mg, Oral, DAILY      CARTIA  MG 24 hr capsule  Quantity:  90 capsule  Generic drug:  diltiazem  TAKE 1 CAPSULE BY MOUTH EVERY DAY      CIALIS 20 MG tablet  Generic drug:  tadalafil  Take as directed.      ciclopirox 0.77 % gel  Topical, As needed      folic acid 1 MG tablet  Commonly known as:  FOLVITE  Take 1 tablet by mouth every day. Do not take the day you take methotrexate      hydrocortisone 2.5 % cream  Topical, As needed      ketoconazole 2 % shampoo  Commonly known as:  NIZORAL  Topical, As needed      losartan-hydrochlorothiazide 100-12.5 mg per tablet  Quantity:  90 tablet  Commonly known as:  HYZAAR  TAKE 1 TABLET BY MOUTH EVERY DAY      methotrexate 2.5 MG tablet  Dose:  12.5 mg  12.5 mg, Oral, Weekly, On Tuesdays      metoprolol succinate 100 MG 24 hr tablet  Quantity:  30 tablet  Commonly known as:  TOPROL-XL  TAKE 1 TABLET(100 MG) BY MOUTH DAILY      multivitamin therapeutic tablet  Dose:  1 tablet  1 tablet, Oral, DAILY      tamsulosin 0.4 mg  Cap  Quantity:  180 capsule  Commonly known as:  FLOMAX  TAKE 1 CAPSULE(0.4 MG) BY MOUTH TWICE DAILY          STOP taking these medications    ibuprofen 800 MG tablet  Commonly known as:  ADVIL,MOTRIN                      Rationale for medication changes:                   Consults   cardiology        Immunizations given this encounter            Anticoagulation Information       Recent INR results:        Results from last 7 days   Lab Units 02/07/19  1428   LN-INR   1.04               Discharge Orders          Discharge Orders   Referral for Post Intervention F/U with NP in-Heart Care   Referral Priority: Routine Referral Type: Consultation   Referral Reason: Evaluation and Treatment   Requested Specialty: Cardiology   Number of Visits Requested: 1      If on metformin or metformin containing products (Glucophage, Glucovance, Avandamet, Metaglip, Riomet, Fortamet, Prandimet, etc), resume in 3 days.            Cardiac Rehab Start Phase II   Standing Status: Future Standing Exp. Date: 02/07/20   Order Comments: For after discharge.  Please call      Examination   No chest pain   Vital Signs in last 24 hours:   Temp:  [97.5  F (36.4  C)-98.4  F (36.9  C)] 98.4  F (36.9  C)  Heart Rate:  [44-65] 54  Resp:  [16-20] 16  BP: (122-144)/(63-69) 122/66  SpO2:  [96 %-99 %] 97 %  General: Alert, looks comfortable.  CVS: S1 S2 present.  Lungs: CTA b/l   Abd: Soft,  no tenderness.  Neuro :  Moving extremities grossly , NO FND           Please see EMR for more detailed significant labs, imaging, consultant notes etc.  Total time spent on discharge: >30  minutes     Angelo Gonzalez MD   [place] [facility]ist Service: Ph:[phone]     CC:Dillan Kim MD   CTA Chest Abdomen Pelvis (Order [id number])   Imaging   Date: 2/6/2019 Department: [facility] Department Released By/Authorizing: Jesus Cabral MD (auto-released)   Study Result     CTA CHEST ABDOMEN PELVIS  2/7/2019 12:25  AM     INDICATION: Aortic dz, non-traumatic, known or suspected chest and abd pain.  h/o aortic surgery. Evaluate for aortic dissection.  TECHNIQUE: Multiphase helical acquisition through the chest, abdomen, and pelvis was performed including noncontrast, arterial phase, and delayed images before and after the administration of IV contrast. 2D and 3D reconstructions were performed by the   CT technologist. Dose reduction techniques were used.   IV CONTRAST: Iohexol (Omni) 100 mL  COMPARISON: CT from 2/17/2017 and 4/20/2016.     FINDINGS:  CT ANGIOGRAM CHEST, ABDOMEN, AND PELVIS: Diffusely ectatic, atherosclerotic descending thoracic and abdominal aorta. The ascending thoracic aorta is ectatic, with a diameter of 3.8 cm. The descending thoracic aorta is aneurysmal, measuring up to 4.1 cm   at the pulmonary artery bifurcation, and measuring 4.4 cm just distal to this. This is not significantly changed compared to 4/20/2016. The aorta measures 4.6 cm just beyond the diaphragmatic robert. Infrarenal aorta measures up to 5.9 x 6.2 cm on image 75   series 3. In the same plane on 2/17/2017 this measured up to 5.9 x 5.8 cm. There is no hyperdense acute aortic intramural hematoma. There is no dissection. There is an aortobiiliac bypass graft which is patent.  There is a large amount of eccentric   mural thrombus in the lower abdominal aorta. The celiac, superior mesenteric, and bilateral renal arteries are atherosclerotic but patent. There is a three-vessel aortic arch with tortuous, atherosclerotic arch vessels which are patent. No central   pulmonary embolism. The iliac and visualized proximal femoral arteries are patent.     CHEST:  LUNGS AND PLEURA: Moderate apical predominant centrilobular emphysematous changes. No focal airspace consolidation. No edema. No pneumothorax or pleural effusion.     MEDIASTINUM: Heart size is normal. Multivessel coronary artery disease. No pericardial effusion. No adenopathy.     ABDOMEN:  Contracted gallbladder. Hepatic steatosis. Normal spleen, adrenals, and pancreas. Stable right lower pole parapelvic renal cyst. There are also smaller cortical cysts on the left which are stable. No hydronephrosis.     No mechanical bowel obstruction. There has been a previous ventral herniorrhaphy, although there are multiple ventral abdominal wall hernias. There is a supraumbilical ventral hernia containing a short segment of nonobstructed transverse colon on image   124 of series 5. There is a larger hernia sac containing small bowel both inferior and insinuating ventrally to the patient's hernia mesh. There is no obstruction at this point either. There is no free air. No focal bowel wall thickening. The appendix is   normal.     PELVIS: Normal bladder. No free fluid or adenopathy.     MUSCULOSKELETAL: Multilevel thoracic and lumbar spine degenerative disc changes.     IMPRESSION:   CONCLUSION:  1.  Negative for thoracoabdominal aortic dissection. There is a previous distal abdominal aortic bypass graft which is patent.     2.  Stable aneurysmal dilatation of the distal descending thoracic aorta. Aneurysmal dilatation of the infrarenal abdominal aorta measures up to 6.2 cm, modestly increased from comparison study.     3.  Moderate apical predominant centrilobular emphysema. No evidence of pneumonia.     4.  Multivessel coronary artery disease.     5.  Mild hepatic steatosis.     6.  Previous ventral herniorrhaphy. Multiple ventral hernias remain, 2 of which contain nonobstructed bowel.                        Admission (Discharged) on 2/7/2019          Routing History          Detailed Report

## 2021-06-28 NOTE — PROGRESS NOTES
Progress Notes by Erica Argueta MD at 3/4/2020  9:50 AM     Author: Erica Argueta MD Service: -- Author Type: Physician    Filed: 3/4/2020 10:08 AM Encounter Date: 3/4/2020 Status: Signed    : Erica Argueta MD (Physician)           [facility]  Heart Care Clinic Follow-up Note    Assessment & Plan        1. Coronary artery disease due to lipid rich plaque - angiography showed normal left main, mild LAD disease, diffuse disease in the circumflex, proximal right coronary artery 70% stenosis with a mid 95% stenosis that were treated with drug-coated stents.  Symptomatically improved, and this was due to chest pain following an abnormal nuclear stress test with a medium-sized area of inferolateral ischemia and possible small nontransmural inferolateral MI.   2. Hypertension -under good control currently.   3. Pure hypercholesterolemia -cholesterol 132 with an LDL of 65 which is excellent however triglycerides 172 and most likely due to diet which she will work on.   4. Atrial fibrillation, unspecified type (H) -paroxysmal, possibly due to sleep apnea, no recent episodes since before 2016.  Currently not on anticoagulation given his ticagrelor.  If he has recurrence we will certainly need to restart given his CHADS 2 VASC score of 3.   5. Abdominal aortic aneurysm (AAA) without rupture (H) -status post endoluminal graft of this in December 2019, to remain anticoagulant for 6 months.   6. Type 2 diabetes mellitus treated without insulin (H)    7. Obesity, unspecified -work on weight loss.   8. SHAWN (obstructive sleep apnea) -CPAP is doing.     Plan  1.  Continue ticagrelor and aspirin until June at which point time will discontinue both and start Plavix 75 mg a day.  2.  Work on weight loss.  3.  Arrange for cardiac rehab.  4.  If recurrent atrial fibrillation will need to add Eliquis 5 mg p.o. twice daily.  5.  Follow-up with me in 1 year or sooner if needed.    Subjective  CC: 74-year-old white  gentleman being seen in follow-up.  Since I seen him he has had endoluminal graft of his abdominal aortic aneurysm.  He comes in having fixed furnace this morning at home.  He is active without any syncope, dizziness, fatigue, chest discomfort, palpitations, shortness breath,  PND, orthopnea or peripheral edema.    Medications  Current Outpatient Medications   Medication Sig Note   ? aspirin 81 MG EC tablet Take 81 mg by mouth daily.    ? betamethasone dipropionate (DIPROLENE) 0.05 % ointment APPLY TO AFFECTED AREA SPARINGLY TWICE DAILY (Patient taking differently: APPLY TO AFFECTED AREA SPARINGLY TWICE DAILY AS NEEDED)    ? blood glucose test strips Use 1 each As Directed 2 (two) times a day. Dispense brand per patient's insurance at pharmacy discretion.    ? ciclopirox 0.77 % gel Apply topically as needed.        4/21/2016: Received from: External Pharmacy   ? CLOBETASOL PROPIONATE, BULK, MISC Use 1 application As Directed as needed.    ? diltiazem (CARDIZEM CD) 120 MG 24 hr capsule TAKE 1 CAPSULE BY MOUTH EVERY DAY    ? folic acid (FOLVITE) 1 MG tablet Take 1 tablet by mouth every day. Do not take the day you take methotrexate       2/24/2017: Received from: External Pharmacy   ? generic lancets Use 1 each As Directed 4 (four) times a day.    ? hydroCHLOROthiazide (HYDRODIURIL) 12.5 MG tablet Take 1 tablet (12.5 mg total) by mouth daily.    ? hydrocortisone 2.5 % cream Apply 1 application topically as needed.       4/21/2016: Received from: External Pharmacy   ? ketoconazole (NIZORAL) 2 % shampoo Apply topically as needed.    ? losartan (COZAAR) 100 MG tablet Take 1 tablet (100 mg total) by mouth daily.    ? metFORMIN (GLUCOPHAGE) 500 MG tablet TAKE 1 TABLET BY MOUTH EVERY MORNING AND 2 TABLETS AT NIGHT.    ? methotrexate 2.5 MG tablet Take 12.5 mg by mouth once a week. On Tuesdays          ? metoprolol succinate (TOPROL-XL) 100 MG 24 hr tablet TAKE 1 TABLET(100 MG) BY MOUTH DAILY    ? nitroglycerin (NITROSTAT)  "0.4 MG SL tablet Place 1 tablet (0.4 mg total) under the tongue every 5 (five) minutes as needed for chest pain.    ? pravastatin (PRAVACHOL) 80 MG tablet TAKE 1 TABLET(80 MG) BY MOUTH AT BEDTIME    ? tadalafil (CIALIS) 20 MG tablet Take as directed.    ? tamsulosin (FLOMAX) 0.4 mg cap Take 1 capsule (0.4 mg total) by mouth 2 (two) times a day.    ? ticagrelor (BRILINTA) 90 mg Tab Take 1 tablet (90 mg total) by mouth 2 (two) times a day.        Objective  /68 (Patient Site: Right Arm, Patient Position: Sitting, Cuff Size: Adult Large)   Pulse 71   Resp 16   Ht 5' 10\" (1.778 m)   Wt (!) 233 lb (105.7 kg) Comment: With shoes.  BMI 33.43 kg/m      General Appearance:    Alert, cooperative, no distress, appears stated age   Head:    Normocephalic, without obvious abnormality, atraumatic   Throat:   Lips, mucosa, and tongue normal; teeth and gums normal   Neck:   Supple, symmetrical, trachea midline, no adenopathy;        thyroid:  No enlargement/tenderness/nodules; no carotid    bruit or JVD   Back:     Symmetric, no curvature, ROM normal, no CVA tenderness   Lungs:     Clear to auscultation bilaterally, respirations unlabored   Chest wall:    No tenderness or deformity   Heart:    Regular rate and rhythm, S1 and S2 normal, no murmur, rub   or gallop   Abdomen:     Soft, non-tender, bowel sounds active all four quadrants,     no masses, no organomegaly, midline scar   Extremities:   Normal, atraumatic, no cyanosis or edema   Pulses:   2+ and symmetric all extremities   Skin:   Skin color, texture, turgor normal, no rashes or lesions     Results    Lab Results personally reviewed   Lab Results   Component Value Date    CHOL 132 02/28/2020    CHOL 146 11/28/2018     Lab Results   Component Value Date    HDL 33 (L) 02/28/2020    HDL 30 (L) 11/28/2018     Lab Results   Component Value Date    LDLCALC 65 02/28/2020    LDLCALC 82 11/28/2018     Lab Results   Component Value Date    TRIG 172 (H) 02/28/2020    TRIG " 170 (H) 11/28/2018     Lab Results   Component Value Date    WBC 8.2 02/06/2019    HGB 13.0 (L) 02/09/2019    HCT 40.4 02/06/2019     02/06/2019     Lab Results   Component Value Date    CREATININE 0.91 03/08/2019    BUN 18 03/08/2019     03/08/2019    K 3.6 03/08/2019    CO2 25 03/08/2019     Review of Systems:   General: WNL  Eyes: WNL  Ears/Nose/Throat: WNL  Lungs: WNL  Heart: WNL  Stomach: WNL  Bladder: WNL  Muscle/Joints: WNL  Skin: WNL  Nervous System: WNL  Mental Health: WNL     Blood: WNL

## 2021-06-30 NOTE — PROGRESS NOTES
Progress Notes by Erica Argueta MD at 3/29/2021  8:10 AM     Author: Erica Argueta MD Service: -- Author Type: Physician    Filed: 3/29/2021  8:30 AM Encounter Date: 3/29/2021 Status: Signed    : Erica Argueta MD (Physician)           [facility]  [facility] Follow-up Note    Assessment & Plan        1. Coronary artery disease due to lipid rich plaque  - angiography showed normal left main, mild LAD disease, diffuse disease in the circumflex, proximal right coronary artery 70% stenosis with a mid 95% stenosis that were treated with drug-coated stents.  Symptomatically improved, and this was due to chest pain following an abnormal nuclear stress test with a medium-sized area of inferolateral ischemia and possible small nontransmural inferolateral MI.  Working on risk factor modification and utilizing chronic Plavix instead of aspirin.   2. Abdominal aortic aneurysm (AAA) without rupture (H -status post endoluminal graft of this in December 2019, to remain on chronic Plavix.   3. Pure hypercholesterolemia -total 132 with LDL of 65 from February 2020 which is excellent and continue pravastatin.   4. Hypertension-tending to run little high on arrival, rechecked it was 140 systolic.  On HCTZ 12.5 mg a day, losartan 100 mg a day, metoprolol 100 mg XL a day, and diltiazem 120 mg a day.  Will monitor and may need to increase diltiazem up to 180 mg.   5. Obesity, unspecified-work on weight loss   6. SHAWN (obstructive sleep apnea)-intolerant of CPAP in past.   7. Type 2 diabetes mellitus treated without insulin (H)-hemoglobin A1c is 7.1 and consider for our Surpass trial.   8. Atrial fibrillation, unspecified type (H)-paroxysmal, possibly due to sleep apnea, no recent episodes since before 2016.  Currently not on anticoagulation given this, will repeat event monitor and if recurrent atrial fibrillation seen start Eliquis 5 mg p.o. twice daily given his CHADS 2 VASC score of 3.      Plan  1.   Work on weight loss.  2.  Monitor blood pressure if need be increase diltiazem 180 mg.  3.  21-day ACT monitor looking for atrial fibrillation and if seen at Eliquis 5 mg p.o. twice daily.  4.  [facility]  5.  Follow-up with me in 1 year or sooner if needed.    Subjective  CC: 75-year-old white gentleman here for yearly follow-up today.  He is currently living with a significant other female, is not very active at home, she keeps house.  For the most part he is getting along well other than having may be palpitations for about 5 to 10 minutes once every 3 months. Patient complains of no syncope, dizziness, fatigue, fevers, chest pain, shortness of breath, PND, orthopnea, nausea, vomiting, or edema.    Medications  Current Outpatient Medications   Medication Sig   ? albuterol (PROAIR HFA;PROVENTIL HFA;VENTOLIN HFA) 90 mcg/actuation inhaler Inhale 1 puff daily as needed.   ? betamethasone dipropionate (DIPROLENE) 0.05 % ointment APPLY TO AFFECTED AREA SPARINGLY TWICE DAILY (Patient taking differently: APPLY TO AFFECTED AREA SPARINGLY TWICE DAILY AS NEEDED)   ? blood glucose test strips Use 1 each As Directed 2 (two) times a day. Dispense brand per patient's insurance at pharmacy discretion.   ? ciclopirox 0.77 % gel Apply topically as needed.          ? CLOBETASOL PROPIONATE, BULK, MISC Use 1 application As Directed as needed.   ? clopidogreL (PLAVIX) 75 mg tablet Take 1 tablet (75 mg total) by mouth daily.   ? diltiazem (CARDIZEM CD) 120 MG 24 hr capsule TAKE 1 CAPSULE BY MOUTH EVERY DAY   ? folic acid (FOLVITE) 1 MG tablet Take 1 tablet by mouth every day. Do not take the day you take methotrexate         ? generic lancets Use 1 each As Directed 4 (four) times a day.   ? hydroCHLOROthiazide (HYDRODIURIL) 12.5 MG tablet Take 1 tablet (12.5 mg total) by mouth daily.   ? hydrocortisone 2.5 % cream Apply 1 application topically as needed.         ? ketoconazole (NIZORAL) 2 % shampoo Apply  "topically as needed.   ? losartan (COZAAR) 100 MG tablet Take 1 tablet (100 mg total) by mouth daily.   ? metFORMIN (GLUCOPHAGE) 500 MG tablet TAKE 1 TABLET BY MOUTH EVERY MORNING AND 2 TABLETS AT NIGHT.   ? methotrexate 2.5 MG tablet Take 12.5 mg by mouth once a week. On Tuesdays         ? metoprolol succinate (TOPROL-XL) 100 MG 24 hr tablet TAKE 1 TABLET(100 MG) BY MOUTH DAILY   ? nitroglycerin (NITROSTAT) 0.4 MG SL tablet Place 1 tablet (0.4 mg total) under the tongue every 5 (five) minutes as needed for chest pain.   ? pravastatin (PRAVACHOL) 80 MG tablet Take 1 tablet (80 mg total) by mouth at bedtime.   ? tadalafil (CIALIS) 20 MG tablet Take as directed.   ? tamsulosin (FLOMAX) 0.4 mg cap Take 1 capsule (0.4 mg total) by mouth 2 (two) times a day.       Objective  /70 (Patient Site: Right Arm, Patient Position: Sitting, Cuff Size: Adult Large)   Pulse 68   Resp 16   Ht 5' 10\" (1.778 m)   Wt (!) 232 lb (105.2 kg)   BMI 33.29 kg/m      General Appearance:    Alert, cooperative, no distress, appears stated age   Head:    Normocephalic, without obvious abnormality, atraumatic   Throat:   Lips, mucosa, and tongue normal; teeth and gums normal   Neck:   Supple, symmetrical, trachea midline, no adenopathy;        thyroid:  No enlargement/tenderness/nodules; no carotid    bruit or JVD   Back:     Symmetric, no curvature, ROM normal, no CVA tenderness   Lungs:     Clear to auscultation bilaterally, respirations unlabored   Chest wall:    No tenderness or deformity   Heart:    Regular rate and rhythm, S1 and S2 normal, no murmur, rub   or gallop   Abdomen:     Soft, non-tender, bowel sounds active all four quadrants,     no masses, no organomegaly   Extremities:   Normal, atraumatic, no cyanosis or edema   Pulses:   2+ and symmetric all extremities   Skin:   Skin color, texture, turgor normal, no rashes or lesions     Results    Lab Results personally reviewed   Lab Results   Component Value Date    CHOL 132 " 02/28/2020    CHOL 146 11/28/2018     Lab Results   Component Value Date    HDL 33 (L) 02/28/2020    HDL 30 (L) 11/28/2018     Lab Results   Component Value Date    LDLCALC 65 02/28/2020    LDLCALC 82 11/28/2018     Lab Results   Component Value Date    TRIG 172 (H) 02/28/2020    TRIG 170 (H) 11/28/2018     Lab Results   Component Value Date    WBC 8.2 02/06/2019    HGB 13.0 (L) 02/09/2019    HCT 40.4 02/06/2019     02/06/2019     Lab Results   Component Value Date    CREATININE 0.91 03/08/2019    BUN 18 03/08/2019     03/08/2019    K 3.6 03/08/2019    CO2 25 03/08/2019     Review of Systems:   General: WNL  Eyes: WNL  Ears/Nose/Throat: WNL  Lungs: WNL  Heart: WNL  Stomach: WNL  Bladder: WNL  Muscle/Joints: WNL  Skin: WNL  Nervous System: WNL  Mental Health: WNL     Blood: WNL

## 2021-08-11 DIAGNOSIS — I71.40 ABDOMINAL AORTIC ANEURYSM (AAA) WITHOUT RUPTURE (H): ICD-10-CM

## 2021-08-11 DIAGNOSIS — I10 ESSENTIAL HYPERTENSION: ICD-10-CM

## 2021-08-11 RX ORDER — HYDROCHLOROTHIAZIDE 12.5 MG/1
12.5 TABLET ORAL DAILY
Qty: 90 TABLET | Refills: 1 | Status: SHIPPED | OUTPATIENT
Start: 2021-08-11 | End: 2022-02-22

## 2021-08-11 RX ORDER — LOSARTAN POTASSIUM 100 MG/1
100 TABLET ORAL DAILY
Qty: 90 TABLET | Refills: 1 | Status: SHIPPED | OUTPATIENT
Start: 2021-08-11 | End: 2022-02-22

## 2021-09-13 DIAGNOSIS — I48.91 NEW ONSET ATRIAL FIBRILLATION (H): ICD-10-CM

## 2021-09-13 DIAGNOSIS — I10 ESSENTIAL HYPERTENSION: ICD-10-CM

## 2021-09-13 DIAGNOSIS — I10 ESSENTIAL HYPERTENSION WITH GOAL BLOOD PRESSURE LESS THAN 140/90: ICD-10-CM

## 2021-09-13 RX ORDER — DILTIAZEM HYDROCHLORIDE 120 MG/1
120 CAPSULE, COATED, EXTENDED RELEASE ORAL DAILY
Qty: 90 CAPSULE | Refills: 1 | Status: SHIPPED | OUTPATIENT
Start: 2021-09-13 | End: 2022-03-21

## 2021-10-10 ENCOUNTER — HEALTH MAINTENANCE LETTER (OUTPATIENT)
Age: 76
End: 2021-10-10

## 2021-12-18 DIAGNOSIS — I25.10 CAD (CORONARY ARTERY DISEASE): Primary | ICD-10-CM

## 2021-12-20 RX ORDER — CLOPIDOGREL BISULFATE 75 MG/1
TABLET ORAL
Qty: 90 TABLET | Refills: 1 | Status: ON HOLD | OUTPATIENT
Start: 2021-12-20 | End: 2022-01-04

## 2022-01-01 ENCOUNTER — NURSE TRIAGE (OUTPATIENT)
Dept: NURSING | Facility: CLINIC | Age: 77
End: 2022-01-01
Payer: COMMERCIAL

## 2022-01-01 ENCOUNTER — HOSPITAL ENCOUNTER (INPATIENT)
Facility: CLINIC | Age: 77
LOS: 2 days | Discharge: ANOTHER HOSPITAL | DRG: 311 | End: 2022-01-03
Attending: EMERGENCY MEDICINE | Admitting: HOSPITALIST
Payer: COMMERCIAL

## 2022-01-01 ENCOUNTER — APPOINTMENT (OUTPATIENT)
Dept: RADIOLOGY | Facility: CLINIC | Age: 77
DRG: 311 | End: 2022-01-01
Attending: EMERGENCY MEDICINE
Payer: COMMERCIAL

## 2022-01-01 DIAGNOSIS — R07.9 CHEST PAIN, UNSPECIFIED TYPE: ICD-10-CM

## 2022-01-01 DIAGNOSIS — E78.2 MIXED HYPERLIPIDEMIA: ICD-10-CM

## 2022-01-01 DIAGNOSIS — R07.2 PRECORDIAL PAIN: Primary | ICD-10-CM

## 2022-01-01 LAB
ALBUMIN SERPL-MCNC: 3.9 G/DL (ref 3.5–5)
ALP SERPL-CCNC: 79 U/L (ref 45–120)
ALT SERPL W P-5'-P-CCNC: 43 U/L (ref 0–45)
ANION GAP SERPL CALCULATED.3IONS-SCNC: 9 MMOL/L (ref 5–18)
AST SERPL W P-5'-P-CCNC: 21 U/L (ref 0–40)
ATRIAL RATE - MUSE: 90 BPM
BASOPHILS # BLD AUTO: 0.1 10E3/UL (ref 0–0.2)
BASOPHILS NFR BLD AUTO: 2 %
BILIRUB SERPL-MCNC: 0.4 MG/DL (ref 0–1)
BUN SERPL-MCNC: 12 MG/DL (ref 8–28)
CALCIUM SERPL-MCNC: 9.4 MG/DL (ref 8.5–10.5)
CHLORIDE BLD-SCNC: 102 MMOL/L (ref 98–107)
CO2 SERPL-SCNC: 28 MMOL/L (ref 22–31)
CREAT SERPL-MCNC: 1.16 MG/DL (ref 0.7–1.3)
DIASTOLIC BLOOD PRESSURE - MUSE: NORMAL MMHG
EOSINOPHIL # BLD AUTO: 0.2 10E3/UL (ref 0–0.7)
EOSINOPHIL NFR BLD AUTO: 3 %
ERYTHROCYTE [DISTWIDTH] IN BLOOD BY AUTOMATED COUNT: 13.2 % (ref 10–15)
ERYTHROCYTE [DISTWIDTH] IN BLOOD BY AUTOMATED COUNT: 13.2 % (ref 10–15)
GFR SERPL CREATININE-BSD FRML MDRD: 65 ML/MIN/1.73M2
GLUCOSE BLD-MCNC: 137 MG/DL (ref 70–125)
GLUCOSE BLDC GLUCOMTR-MCNC: 105 MG/DL (ref 70–99)
HCT VFR BLD AUTO: 37.7 % (ref 40–53)
HCT VFR BLD AUTO: 40.5 % (ref 40–53)
HGB BLD-MCNC: 13 G/DL (ref 13.3–17.7)
HGB BLD-MCNC: 13.8 G/DL (ref 13.3–17.7)
HOLD SPECIMEN: NORMAL
IMM GRANULOCYTES # BLD: 0 10E3/UL
IMM GRANULOCYTES NFR BLD: 0 %
INTERPRETATION ECG - MUSE: NORMAL
LYMPHOCYTES # BLD AUTO: 2.2 10E3/UL (ref 0.8–5.3)
LYMPHOCYTES NFR BLD AUTO: 28 %
MCH RBC QN AUTO: 31.6 PG (ref 26.5–33)
MCH RBC QN AUTO: 31.8 PG (ref 26.5–33)
MCHC RBC AUTO-ENTMCNC: 34.1 G/DL (ref 31.5–36.5)
MCHC RBC AUTO-ENTMCNC: 34.5 G/DL (ref 31.5–36.5)
MCV RBC AUTO: 92 FL (ref 78–100)
MCV RBC AUTO: 93 FL (ref 78–100)
MONOCYTES # BLD AUTO: 0.7 10E3/UL (ref 0–1.3)
MONOCYTES NFR BLD AUTO: 8 %
NEUTROPHILS # BLD AUTO: 4.8 10E3/UL (ref 1.6–8.3)
NEUTROPHILS NFR BLD AUTO: 59 %
NRBC # BLD AUTO: 0 10E3/UL
NRBC BLD AUTO-RTO: 0 /100
P AXIS - MUSE: 33 DEGREES
PLATELET # BLD AUTO: 240 10E3/UL (ref 150–450)
PLATELET # BLD AUTO: 277 10E3/UL (ref 150–450)
POTASSIUM BLD-SCNC: 3.5 MMOL/L (ref 3.5–5)
PR INTERVAL - MUSE: 170 MS
PROCALCITONIN SERPL-MCNC: 0.06 NG/ML (ref 0–0.49)
PROT SERPL-MCNC: 7.4 G/DL (ref 6–8)
QRS DURATION - MUSE: 82 MS
QT - MUSE: 370 MS
QTC - MUSE: 452 MS
R AXIS - MUSE: 21 DEGREES
RBC # BLD AUTO: 4.09 10E6/UL (ref 4.4–5.9)
RBC # BLD AUTO: 4.37 10E6/UL (ref 4.4–5.9)
SARS-COV-2 RNA RESP QL NAA+PROBE: NEGATIVE
SODIUM SERPL-SCNC: 139 MMOL/L (ref 136–145)
SYSTOLIC BLOOD PRESSURE - MUSE: NORMAL MMHG
T AXIS - MUSE: 44 DEGREES
TROPONIN I SERPL-MCNC: 0.01 NG/ML (ref 0–0.29)
TROPONIN I SERPL-MCNC: 0.26 NG/ML (ref 0–0.29)
TROPONIN I SERPL-MCNC: 0.33 NG/ML (ref 0–0.29)
TROPONIN I SERPL-MCNC: 0.37 NG/ML (ref 0–0.29)
UFH PPP CHRO-ACNC: 0.27 IU/ML
VENTRICULAR RATE- MUSE: 90 BPM
WBC # BLD AUTO: 7.7 10E3/UL (ref 4–11)
WBC # BLD AUTO: 8 10E3/UL (ref 4–11)

## 2022-01-01 PROCEDURE — 85027 COMPLETE CBC AUTOMATED: CPT | Performed by: INTERNAL MEDICINE

## 2022-01-01 PROCEDURE — 36415 COLL VENOUS BLD VENIPUNCTURE: CPT | Performed by: EMERGENCY MEDICINE

## 2022-01-01 PROCEDURE — 99285 EMERGENCY DEPT VISIT HI MDM: CPT | Mod: 25

## 2022-01-01 PROCEDURE — C9803 HOPD COVID-19 SPEC COLLECT: HCPCS

## 2022-01-01 PROCEDURE — 96360 HYDRATION IV INFUSION INIT: CPT

## 2022-01-01 PROCEDURE — 93005 ELECTROCARDIOGRAM TRACING: CPT | Performed by: EMERGENCY MEDICINE

## 2022-01-01 PROCEDURE — 84145 PROCALCITONIN (PCT): CPT | Performed by: HOSPITALIST

## 2022-01-01 PROCEDURE — 250N000013 HC RX MED GY IP 250 OP 250 PS 637: Performed by: EMERGENCY MEDICINE

## 2022-01-01 PROCEDURE — 71046 X-RAY EXAM CHEST 2 VIEWS: CPT

## 2022-01-01 PROCEDURE — 258N000003 HC RX IP 258 OP 636: Performed by: HOSPITALIST

## 2022-01-01 PROCEDURE — 84484 ASSAY OF TROPONIN QUANT: CPT | Performed by: EMERGENCY MEDICINE

## 2022-01-01 PROCEDURE — 85520 HEPARIN ASSAY: CPT | Performed by: INTERNAL MEDICINE

## 2022-01-01 PROCEDURE — 36415 COLL VENOUS BLD VENIPUNCTURE: CPT | Performed by: HOSPITALIST

## 2022-01-01 PROCEDURE — 80053 COMPREHEN METABOLIC PANEL: CPT | Performed by: EMERGENCY MEDICINE

## 2022-01-01 PROCEDURE — 96361 HYDRATE IV INFUSION ADD-ON: CPT

## 2022-01-01 PROCEDURE — 250N000013 HC RX MED GY IP 250 OP 250 PS 637: Performed by: INTERNAL MEDICINE

## 2022-01-01 PROCEDURE — G0378 HOSPITAL OBSERVATION PER HR: HCPCS

## 2022-01-01 PROCEDURE — 210N000002 HC R&B HEART CARE

## 2022-01-01 PROCEDURE — 84484 ASSAY OF TROPONIN QUANT: CPT | Performed by: HOSPITALIST

## 2022-01-01 PROCEDURE — 250N000011 HC RX IP 250 OP 636: Performed by: INTERNAL MEDICINE

## 2022-01-01 PROCEDURE — 36415 COLL VENOUS BLD VENIPUNCTURE: CPT | Performed by: FAMILY MEDICINE

## 2022-01-01 PROCEDURE — 99222 1ST HOSP IP/OBS MODERATE 55: CPT | Performed by: INTERNAL MEDICINE

## 2022-01-01 PROCEDURE — 99207 PR CDG-CODE CATEGORY CHANGED: CPT | Performed by: HOSPITALIST

## 2022-01-01 PROCEDURE — 99220 PR INITIAL OBSERVATION CARE,LEVEL III: CPT | Performed by: HOSPITALIST

## 2022-01-01 PROCEDURE — 85025 COMPLETE CBC W/AUTO DIFF WBC: CPT | Performed by: EMERGENCY MEDICINE

## 2022-01-01 PROCEDURE — 87635 SARS-COV-2 COVID-19 AMP PRB: CPT | Performed by: EMERGENCY MEDICINE

## 2022-01-01 PROCEDURE — 84484 ASSAY OF TROPONIN QUANT: CPT | Performed by: FAMILY MEDICINE

## 2022-01-01 PROCEDURE — 99207 PR NO BILLABLE SERVICE THIS VISIT: CPT | Performed by: FAMILY MEDICINE

## 2022-01-01 PROCEDURE — 250N000013 HC RX MED GY IP 250 OP 250 PS 637: Performed by: FAMILY MEDICINE

## 2022-01-01 PROCEDURE — 36415 COLL VENOUS BLD VENIPUNCTURE: CPT | Performed by: INTERNAL MEDICINE

## 2022-01-01 RX ORDER — ONDANSETRON 2 MG/ML
4 INJECTION INTRAMUSCULAR; INTRAVENOUS EVERY 6 HOURS PRN
Status: DISCONTINUED | OUTPATIENT
Start: 2022-01-01 | End: 2022-01-03 | Stop reason: HOSPADM

## 2022-01-01 RX ORDER — CLOPIDOGREL BISULFATE 75 MG/1
75 TABLET ORAL DAILY
Status: DISCONTINUED | OUTPATIENT
Start: 2022-01-01 | End: 2022-01-03 | Stop reason: HOSPADM

## 2022-01-01 RX ORDER — NITROGLYCERIN 0.4 MG/1
0.4 TABLET SUBLINGUAL EVERY 5 MIN PRN
Status: DISCONTINUED | OUTPATIENT
Start: 2022-01-01 | End: 2022-01-03 | Stop reason: HOSPADM

## 2022-01-01 RX ORDER — TAMSULOSIN HYDROCHLORIDE 0.4 MG/1
0.4 CAPSULE ORAL 2 TIMES DAILY
Status: DISCONTINUED | OUTPATIENT
Start: 2022-01-01 | End: 2022-01-03 | Stop reason: HOSPADM

## 2022-01-01 RX ORDER — LOSARTAN POTASSIUM 50 MG/1
100 TABLET ORAL DAILY
Status: DISCONTINUED | OUTPATIENT
Start: 2022-01-01 | End: 2022-01-03 | Stop reason: HOSPADM

## 2022-01-01 RX ORDER — ACETAMINOPHEN 650 MG/1
650 SUPPOSITORY RECTAL EVERY 6 HOURS PRN
Status: DISCONTINUED | OUTPATIENT
Start: 2022-01-01 | End: 2022-01-03 | Stop reason: HOSPADM

## 2022-01-01 RX ORDER — ACETAMINOPHEN 325 MG/1
650 TABLET ORAL EVERY 6 HOURS PRN
Status: DISCONTINUED | OUTPATIENT
Start: 2022-01-01 | End: 2022-01-03 | Stop reason: HOSPADM

## 2022-01-01 RX ORDER — HEPARIN SODIUM 10000 [USP'U]/100ML
0-5000 INJECTION, SOLUTION INTRAVENOUS CONTINUOUS
Status: DISCONTINUED | OUTPATIENT
Start: 2022-01-01 | End: 2022-01-03 | Stop reason: HOSPADM

## 2022-01-01 RX ORDER — HYDROCHLOROTHIAZIDE 12.5 MG/1
12.5 CAPSULE ORAL DAILY
Status: DISCONTINUED | OUTPATIENT
Start: 2022-01-01 | End: 2022-01-03 | Stop reason: HOSPADM

## 2022-01-01 RX ORDER — ASPIRIN 81 MG/1
162 TABLET, CHEWABLE ORAL ONCE
Status: COMPLETED | OUTPATIENT
Start: 2022-01-01 | End: 2022-01-01

## 2022-01-01 RX ORDER — ONDANSETRON 4 MG/1
4 TABLET, ORALLY DISINTEGRATING ORAL EVERY 6 HOURS PRN
Status: DISCONTINUED | OUTPATIENT
Start: 2022-01-01 | End: 2022-01-03 | Stop reason: HOSPADM

## 2022-01-01 RX ORDER — DILTIAZEM HYDROCHLORIDE 120 MG/1
120 CAPSULE, COATED, EXTENDED RELEASE ORAL DAILY
Status: DISCONTINUED | OUTPATIENT
Start: 2022-01-01 | End: 2022-01-03 | Stop reason: HOSPADM

## 2022-01-01 RX ORDER — PREDNISOLONE ACETATE 10 MG/ML
1 SUSPENSION/ DROPS OPHTHALMIC 4 TIMES DAILY
COMMUNITY
End: 2022-01-01

## 2022-01-01 RX ORDER — NICOTINE POLACRILEX 4 MG
15-30 LOZENGE BUCCAL
Status: DISCONTINUED | OUTPATIENT
Start: 2022-01-01 | End: 2022-01-03 | Stop reason: HOSPADM

## 2022-01-01 RX ORDER — DEXTROSE MONOHYDRATE 25 G/50ML
25-50 INJECTION, SOLUTION INTRAVENOUS
Status: DISCONTINUED | OUTPATIENT
Start: 2022-01-01 | End: 2022-01-03 | Stop reason: HOSPADM

## 2022-01-01 RX ORDER — SODIUM CHLORIDE, SODIUM LACTATE, POTASSIUM CHLORIDE, CALCIUM CHLORIDE 600; 310; 30; 20 MG/100ML; MG/100ML; MG/100ML; MG/100ML
INJECTION, SOLUTION INTRAVENOUS CONTINUOUS
Status: DISCONTINUED | OUTPATIENT
Start: 2022-01-01 | End: 2022-01-01

## 2022-01-01 RX ORDER — METOPROLOL SUCCINATE 100 MG/1
100 TABLET, EXTENDED RELEASE ORAL DAILY
Status: DISCONTINUED | OUTPATIENT
Start: 2022-01-01 | End: 2022-01-03 | Stop reason: HOSPADM

## 2022-01-01 RX ORDER — BENZONATATE 100 MG/1
100-200 CAPSULE ORAL 3 TIMES DAILY PRN
COMMUNITY
End: 2022-01-01

## 2022-01-01 RX ORDER — FAMOTIDINE 10 MG
10 TABLET ORAL 2 TIMES DAILY
Status: DISCONTINUED | OUTPATIENT
Start: 2022-01-01 | End: 2022-01-03 | Stop reason: HOSPADM

## 2022-01-01 RX ADMIN — METFORMIN HYDROCHLORIDE 1000 MG: 500 TABLET ORAL at 16:56

## 2022-01-01 RX ADMIN — FAMOTIDINE 10 MG: 10 TABLET ORAL at 19:44

## 2022-01-01 RX ADMIN — HEPARIN SODIUM 1200 UNITS/HR: 10000 INJECTION, SOLUTION INTRAVENOUS at 15:15

## 2022-01-01 RX ADMIN — SODIUM CHLORIDE, POTASSIUM CHLORIDE, SODIUM LACTATE AND CALCIUM CHLORIDE: 600; 310; 30; 20 INJECTION, SOLUTION INTRAVENOUS at 04:04

## 2022-01-01 RX ADMIN — ASPIRIN 81 MG 162 MG: 81 TABLET ORAL at 02:22

## 2022-01-01 RX ADMIN — TAMSULOSIN HYDROCHLORIDE 0.4 MG: 0.4 CAPSULE ORAL at 19:44

## 2022-01-01 ASSESSMENT — ACTIVITIES OF DAILY LIVING (ADL)
ADLS_ACUITY_SCORE: 8
ADLS_ACUITY_SCORE: 4
DIFFICULTY_COMMUNICATING: NO
ADLS_ACUITY_SCORE: 8
DIFFICULTY_EATING/SWALLOWING: NO
ADLS_ACUITY_SCORE: 4
WALKING_OR_CLIMBING_STAIRS_DIFFICULTY: NO
ADLS_ACUITY_SCORE: 4
DOING_ERRANDS_INDEPENDENTLY_DIFFICULTY: NO
CONCENTRATING,_REMEMBERING_OR_MAKING_DECISIONS_DIFFICULTY: NO
ADLS_ACUITY_SCORE: 4
ADLS_ACUITY_SCORE: 8
ADLS_ACUITY_SCORE: 4
ADLS_ACUITY_SCORE: 4
DRESSING/BATHING_DIFFICULTY: NO
WEAR_GLASSES_OR_BLIND: YES
DEPENDENT_IADLS:: INDEPENDENT
FALL_HISTORY_WITHIN_LAST_SIX_MONTHS: NO
TOILETING_ISSUES: NO
VISION_MANAGEMENT: GLASSES
HEARING_DIFFICULTY_OR_DEAF: NO

## 2022-01-01 ASSESSMENT — ENCOUNTER SYMPTOMS
COUGH: 0
VOMITING: 0
FEVER: 0
NAUSEA: 0
SHORTNESS OF BREATH: 1
DIAPHORESIS: 0

## 2022-01-01 ASSESSMENT — MIFFLIN-ST. JEOR: SCORE: 1798.05

## 2022-01-01 NOTE — TELEPHONE ENCOUNTER
Triage Call    Pt calling with report of Chest Pain in the middle of his chest.  Says he took tums because it feels like heartburn but that didn't help.  Says it feels like the pain he had when they had to put stents in 2 years ago.  Instructed him to take one nitroglycerine tablet. Pt placed tab under tongue. Says he got no relief from nitroglycerine, so had him take a second one and instructed him to call .    Pt was advised to call 911 but he refuses saying his neighborhood is quiet and he doesn't want commotion.  Says he will go to the ED to be evaluated.  Care Advice given per Chest Pain Guideline.    Syl Keith, RN      Reason for Disposition    [1] Chest pain lasts > 5 minutes AND [2] not relieved with nitroglycerin    Additional Information    Negative: [1] Chest pain lasts > 5 minutes AND [2] age > 30 AND [3] at least one cardiac risk factor (i.e., hypertension, diabetes, obesity, smoker or strong family history of heart disease)    Negative: [1] Chest pain lasts > 5 minutes AND [2] age > 50    Negative: [1] Chest pain lasts > 5 minutes AND [2] described as crushing, pressure-like, or heavy    Negative: [1] Chest pain lasts > 5 minutes AND [2] history of heart disease  (i.e., heart attack, bypass surgery, angina, angioplasty, CHF; not just a heart murmur)    Negative: Shock suspected (e.g., cold/pale/clammy skin, too weak to stand, low BP, rapid pulse)    Negative: Difficult to awaken or acting confused (e.g., disoriented, slurred speech)    Negative: Severe difficulty breathing (e.g., struggling for each breath, speaks in single words)    Protocols used: CHEST PAIN-A-AH

## 2022-01-01 NOTE — ED TRIAGE NOTES
The patient presents to the ED with c/o mid chest pain that started about 1.5 hours ago. Thought it was reflux and tried Tums and then took nitroglycerin x2 without relief. Feeling SOB. Has 2 cardiac stents.

## 2022-01-01 NOTE — CONSULTS
Care Management Initial Consult    General Information  Assessment completed with: Patient,Spouse or significant other, wife Ellen bedside  Type of CM/SW Visit: Initial Assessment (MOON notice)    Primary Care Provider verified and updated as needed:  (SHARON Leiva, trying to find new one PCP)   Readmission within the last 30 days: no previous admission in last 30 days         Advance Care Planning:  Has Health Care Directive- at home. Patient and wife aware they can bring it in to be scanned into Epic/[employer] system.        Communication Assessment  Patient's communication style: spoken language (English or Bilingual)    Hearing Difficulty or Deaf: no   Wear Glasses or Blind: yes    Cognitive  Cognitive/Neuro/Behavioral: WDL                      Living Environment:   People in home: spouse  karis Hopson  Current living Arrangements: house      Able to return to prior arrangements: yes       Family/Social Support:  Care provided by: self  Provides care for: no one  Marital Status:   Wife,Children  wife Ellen, 5 children       Description of Support System: Supportive,Involved         Current Resources:   Patient receiving home care services: No     Community Resources: None  Equipment currently used at home: none  Supplies currently used at home: Diabetic Supplies (has glucometer)    Employment/Financial:  Employment Status: retired     Employment/ Comments: no  or VA affiliation  Financial Concerns: No concerns identified   Referral to Financial Counselor: No     Functional Status:  Prior to admission patient needed assistance:   Dependent ADLs:: Independent  Dependent IADLs:: Independent       Mental Health Status:  Mental Health Status: No Current Concerns       Chemical Dependency Status:  Chemical Dependency Status: No Current Concerns             Values/Beliefs:  Spiritual, Cultural Beliefs, Christianity Practices, Values that affect care: no               Additional  Information:  Chart reviewed.   CM provided MOON notice to patient and wife. Has  Medicare Advantage Plan. Both verbalized understanding. Patient sign. Original in chart and copy to patient.  Patient lives with wife in private home. Independent with all cares, including driving, retired. No Home Care services. Wears Rx glasses and has glucometer, no other assistive devices. Currently in process of finding new PCP- was going to  in [place] but his PCP quit/retired. No VA affiliation.     Patient and wife deny needs from CM. Wife will provide the transportation home at hospital discharge.     April Beasley RN

## 2022-01-01 NOTE — H&P
"Hospital Medicine Service History and Physical  [facility]: [facility]    Koko Ronquillo is a 76 year old male who  has a past medical history of Anxiety, Atrial fibrillation (H), Chest pain made worse by breathing (4/21/2016), CIS (carcinoma in situ of bladder) (8/17/2017), Fatigue (5/19/2016), Hematuria (3/1/2017), HLD (hyperlipidemia), HTN (hypertension), Psoriasis, RA (rheumatoid arthritis) (H), and Sleep apnea.   Chief Complaint: chest pain    Assessment and Plan  Chest pain & CAD  Prior abnormal nuclear stress  prior RANCHO placed 2019  Trend troponin  Continue telemetry  Given his history, will make n.p.o. and request cardiology to see to determine if repeat stress testing would be indicated versus proceeding to CT coronary?  Make n.p.o., maintenance IV fluid  Symptoms included some palpitations  He does have history of paroxysmal atrial fibrillation which could be acting up in the context of possible pneumonia    Right upper lung opacity  Infection not excluded on imaging  Symptoms do not align well with pneumonia, no leukocytosis either  We will check a procalcitonin    AAA  Status post endoluminal graft December 2019    BMI 33    DVTP: obs status  Code Status: No Order Full  Disposition: Observation   Estimated body mass index is 33 kg/m  as calculated from the following:    Height as of 3/29/21: 1.778 m (5' 10\").    Weight as of this encounter: 104.3 kg (230 lb).    History of Present Illness  Koko Ronquillo presents after 1 hour history of chest discomfort that began while he was lying in bed. Says he felt a burning in his chest without associated radiation of discomfort to the abdomen, back, or arms or neck. He tried taking Tums which did not provide relief. He then took nitroglycerin which improved his symptoms. Denies vomiting, diaphoresis, but did have associated shortness of breath. When he talks to me he is currently asymptomatic.  In the ED his lab work-up was fairly unremarkable " and troponin was negative. He took 162 mg aspirin. EKG does not reveal acute ischemia or arrhythmia.    All other systems reviewed and are negative    Appears nad  Anicteric conjunctiva, PERRL, glasses  moist mucous membranes, normocephalic, atraumatic   trachea midline, no JVD  Clear to auscultation, Respiratory effort normal on room air  Regular rate and rhythm, no murmur  Abdomen soft, nondistended, nontender  No edema, clubbing absent  Skin normal temperature, dry  Normal affect, alert  Vital signs reviewed by me    Wt Readings from Last 4 Encounters:   01/01/22 104.3 kg (230 lb)   03/29/21 105.2 kg (232 lb)   03/04/20 105.7 kg (233 lb)   04/29/19 110.2 kg (243 lb)        reports that he quit smoking about 14 years ago. He has never used smokeless tobacco. He reports current alcohol use. He reports that he does not use drugs.  family history includes Acute Myocardial Infarction (age of onset: 58.00) in his father.   has a past surgical history that includes IR Thoracic Aortogram (1/24/2007); IR Miscellaneous Procedure (1/24/2007); Abdominal Aortic Aneurysm Repair (1995); other surgical history; hernia repair; other surgical history; Cystoscopy, transurethral resection (TUR) tumor bladder, combined (N/A, 3/1/2017); and Cv Coronary Angiogram (N/A, 2/8/2019).   No Known Allergies    Lucio Barboza MD, MPH  [facility]   Phone: #[phone]

## 2022-01-01 NOTE — ED PROVIDER NOTES
EMERGENCY DEPARTMENT ENCOUNTER      NAME: Koko Ronquillo  AGE: 76 year old male  YOB: 1945  MRN: [id number]  EVALUATION DATE & TIME: 1/1/2022  1:56 AM    PCP: Eb Mccracken    ED PROVIDER: Jesus Cabral M.D.      Chief Complaint   Patient presents with     Chest Pain         FINAL IMPRESSION:  1. Chest pain, unspecified type          ED COURSE & MEDICAL DECISION MAKING:    Pertinent Labs & Imaging studies reviewed. (See chart for details)  76 year old male presents to the Emergency Department for evaluation of chest pain.  Came in at night.  Better with nitro.  Has a history of cardiac disease.  EKG here does not show any obvious ischemia.  Initial troponin is negative.  Chest x-ray is clear.  No signs of pneumonia pneumothorax.  Somewhat concerned given his cardiac history.  Given this patient was brought in under observation status.  Discussed with Dr. Barboza, the hospitalist.  Patient is now chest pain-free.  Aspirin given in the ER    2:06 AM I met with the patient to gather history and to perform my initial exam. I discussed the plan for care while in the Emergency Department. PPE: surgical mask, gloves, protective eyewear  3:06 AM I discussed the case with hospitalist, Dr. Barboza, who accepts the patient for admission.     At the conclusion of the encounter I discussed the results of all of the tests and the disposition. The questions were answered. The patient or family acknowledged understanding and was agreeable with the care plan.            MEDICATIONS GIVEN IN THE EMERGENCY:  Medications   melatonin tablet 1 mg (has no administration in time range)   ondansetron (ZOFRAN-ODT) ODT tab 4 mg (has no administration in time range)     Or   ondansetron (ZOFRAN) injection 4 mg (has no administration in time range)   acetaminophen (TYLENOL) tablet 650 mg (has no administration in time range)     Or   acetaminophen (TYLENOL) Suppository 650 mg (has no administration in time range)   nitroGLYcerin  (NITROSTAT) sublingual tablet 0.4 mg (has no administration in time range)   lactated ringers infusion ( Intravenous New Bag 1/1/22 5233)   nitroGLYcerin (NITROSTAT) sublingual tablet 0.4 mg (has no administration in time range)   aspirin (ASA) chewable tablet 162 mg (162 mg Oral Given 1/1/22 6040)       NEW PRESCRIPTIONS STARTED AT TODAY'S ER VISIT  New Prescriptions    No medications on file          =================================================================    HPI    Patient information was obtained from: Patient    Use of : N/A         Koko Ronquillo is a 76 year old male with a pertinent history of CAD s/p stents x2, HLD, HTN, atrial fibrillation, DM II, RA, AAA, who presents to this ED by walk in for evaluation of chest pain.     Patient reports at midnight (2 hours ago) he laid down to go to sleep and developed a burning pain to central chest. He took TUMS without relief. Patient called triage line and was told to take the nitro that he has at home and present to ED for evaluation. En route to ED, patient had some shortness of breath but states currently chest pain has improved. Denies nausea, vomiting, or diaphoresis. No recent travel. Patient otherwise denies recent illness, fever, cough, leg pain or swelling, or additional medical concerns or complaints at this time.      REVIEW OF SYSTEMS   Review of Systems   Constitutional: Negative for diaphoresis and fever.   Respiratory: Positive for shortness of breath. Negative for cough.    Cardiovascular: Positive for chest pain (central). Negative for leg swelling.   Gastrointestinal: Negative for nausea and vomiting.   All other systems reviewed and are negative.     PAST MEDICAL HISTORY:  Past Medical History:   Diagnosis Date     Anxiety      Atrial fibrillation (H)      Chest pain made worse by breathing 4/21/2016     CIS (carcinoma in situ of bladder) 8/17/2017     Fatigue 5/19/2016     Hematuria 3/1/2017     HLD (hyperlipidemia)       HTN (hypertension)      Psoriasis      RA (rheumatoid arthritis) (H)      Sleep apnea     does not use CPAP       PAST SURGICAL HISTORY:  Past Surgical History:   Procedure Laterality Date     ABDOMINAL AORTIC ANEURYSM REPAIR  1995     CV CORONARY ANGIOGRAM N/A 2/8/2019    Procedure: Coronary Angiogram;  Surgeon: El De Oliveira MD;  Location: [facility];  Service: Cardiology     CYSTOSCOPY, TRANSURETHRAL RESECTION (TUR) TUMOR BLADDER, COMBINED N/A 3/1/2017    Procedure: CYSTOSCOPY BLADDER BIOPSY AND FULGURATION;  Surgeon: Kostas Smith MD;  Location: [facility] OR;  Service:      HERNIA REPAIR      with nesh     IR MISCELLANEOUS PROCEDURE  1/24/2007     IR THORACIC AORTOGRAM  1/24/2007     OTHER SURGICAL HISTORY      aortic bypass     OTHER SURGICAL HISTORY      nasal cauterization           CURRENT MEDICATIONS:    Current Facility-Administered Medications   Medication     acetaminophen (TYLENOL) tablet 650 mg    Or     acetaminophen (TYLENOL) Suppository 650 mg     lactated ringers infusion     melatonin tablet 1 mg     nitroGLYcerin (NITROSTAT) sublingual tablet 0.4 mg     nitroGLYcerin (NITROSTAT) sublingual tablet 0.4 mg     ondansetron (ZOFRAN-ODT) ODT tab 4 mg    Or     ondansetron (ZOFRAN) injection 4 mg     Current Outpatient Medications   Medication     albuterol (PROAIR HFA;PROVENTIL HFA;VENTOLIN HFA) 90 mcg/actuation inhaler     betamethasone dipropionate (DIPROLENE) 0.05 % ointment     blood glucose test strips     ciclopirox 0.77 % gel     CLOBETASOL PROPIONATE, BULK, MISC     clopidogrel (PLAVIX) 75 MG tablet     clopidogreL (PLAVIX) 75 mg tablet     clopidogreL (PLAVIX) 75 mg tablet     diltiazem ER COATED BEADS (CARDIZEM CD/CARTIA XT) 120 MG 24 hr capsule     folic acid (FOLVITE) 1 MG tablet     generic lancets     hydrochlorothiazide (HYDRODIURIL) 12.5 MG tablet     hydrocortisone 2.5 % cream     ketoconazole (NIZORAL) 2 % shampoo     losartan (COZAAR) 100 MG tablet      metFORMIN (GLUCOPHAGE) 500 MG tablet     methotrexate 2.5 MG tablet     metoprolol succinate (TOPROL-XL) 100 MG 24 hr tablet     nitroglycerin (NITROSTAT) 0.4 MG SL tablet     pravastatin (PRAVACHOL) 80 MG tablet     tadalafil (CIALIS) 20 MG tablet     tamsulosin (FLOMAX) 0.4 mg cap         ALLERGIES:  No Known Allergies    FAMILY HISTORY:  Family History   Problem Relation Age of Onset     Acute Myocardial Infarction Father 58.00       SOCIAL HISTORY:   Social History     Socioeconomic History     Marital status:      Spouse name: Not on file     Number of children: Not on file     Years of education: Not on file     Highest education level: Not on file   Occupational History     Not on file   Tobacco Use     Smoking status: Former Smoker     Quit date: 2007     Years since quittin.8     Smokeless tobacco: Never Used   Substance and Sexual Activity     Alcohol use: Yes     Comment: Alcoholic Drinks/day: occasional     Drug use: No     Sexual activity: Not on file   Other Topics Concern     Not on file   Social History Narrative     Not on file     Social Determinants of Health     Financial Resource Strain: Not on file   Food Insecurity: Not on file   Transportation Needs: Not on file   Physical Activity: Not on file   Stress: Not on file   Social Connections: Not on file   Intimate Partner Violence: Not on file   Housing Stability: Not on file       VITALS:  /56   Pulse 75   Temp 97.5  F (36.4  C) (Oral)   Resp 22   Wt 104.3 kg (230 lb)   SpO2 97%   BMI 33.00 kg/m      PHYSICAL EXAM    Physical Exam  Constitutional:       General: He is not in acute distress.     Appearance: He is not diaphoretic.   HENT:      Head: Atraumatic.   Eyes:      General: No scleral icterus.     Pupils: Pupils are equal, round, and reactive to light.   Cardiovascular:      Heart sounds: Normal heart sounds.   Pulmonary:      Effort: No respiratory distress.      Breath sounds: Normal breath sounds.    Abdominal:      General: Bowel sounds are normal.      Palpations: Abdomen is soft.      Tenderness: There is no abdominal tenderness.   Musculoskeletal:         General: No tenderness.   Skin:     General: Skin is warm.      Findings: No rash.           LAB:  All pertinent labs reviewed and interpreted.  Labs Ordered and Resulted from Time of ED Arrival to Time of ED Departure   COMPREHENSIVE METABOLIC PANEL - Abnormal       Result Value    Sodium 139      Potassium 3.5      Chloride 102      Carbon Dioxide (CO2) 28      Anion Gap 9      Urea Nitrogen 12      Creatinine 1.16      Calcium 9.4      Glucose 137 (*)     Alkaline Phosphatase 79      AST 21      ALT 43      Protein Total 7.4      Albumin 3.9      Bilirubin Total 0.4      GFR Estimate 65     CBC WITH PLATELETS AND DIFFERENTIAL - Abnormal    WBC Count 8.0      RBC Count 4.37 (*)     Hemoglobin 13.8      Hematocrit 40.5      MCV 93      MCH 31.6      MCHC 34.1      RDW 13.2      Platelet Count 277      % Neutrophils 59      % Lymphocytes 28      % Monocytes 8      % Eosinophils 3      % Basophils 2      % Immature Granulocytes 0      NRBCs per 100 WBC 0      Absolute Neutrophils 4.8      Absolute Lymphocytes 2.2      Absolute Monocytes 0.7      Absolute Eosinophils 0.2      Absolute Basophils 0.1      Absolute Immature Granulocytes 0.0      Absolute NRBCs 0.0     TROPONIN I - Normal    Troponin I 0.01     COVID-19 VIRUS (CORONAVIRUS) BY PCR - Normal    SARS CoV2 PCR Negative     TROPONIN I   PROCALCITONIN       RADIOLOGY:  Reviewed all pertinent imaging. Please see official radiology report.  Chest XR,  PA & LAT   Final Result   IMPRESSION: Stable cardiomediastinal silhouette. Atherosclerotic, ectatic aorta. Partial visualization of aortic stent graft in the lower chest and upper abdomen. Basilar interstitial prominence. Mild reticulonodular opacity right upper lung. Infectious    or inflammatory etiology not excluded. Follow-up to confirm resolution  recommended to exclude pulmonary nodules.          EKG:    Performed at:203  Impression: Sinus rhythm.  Septal infarct.  Unchanged from previous dated February 9, 2019  Sinus rhythm retrograde 90.  .  QRS 82.  QTc 452.    I have independently reviewed and interpreted the EKG(s) documented above.      I, Maliha Carmona, am serving as a scribe to document services personally performed by Dr. Jesus Cabral, based on my observation and the provider's statements to me. I, Jesus Cabral MD attest that Maliha Carmona is acting in a scribe capacity, has observed my performance of the services and has documented them in accordance with my direction.    Jesus Cabral M.D.  Emergency Medicine  [facility] EMERGENCY ROOM  [address] [id number] [phone]  Dept: [phone]     Jesus Cabral MD  01/01/22 0515

## 2022-01-01 NOTE — ED NOTES
Ambulated patient to the restroom, patient felt a little dizzy. Sat at the side of the bed to re group. Patient felt better after sitting up for awhile. Independent with SBA to the restroom. Patient is back on vitals. Warm blanket applied. Also ice chips given.   Patient was wondering about food and coffee at this time writer told him had to wait till further notice from the MD

## 2022-01-01 NOTE — H&P (VIEW-ONLY)
[facility]  [phone]          Assessment/Recommendations   Patient with known coronary artery disease, status post PCI of 2 areas in his right coronary artery a couple of years ago.  He has recurrent symptoms of burning in his chest.  He had eaten some spicy salsa which she rarely does earlier that day and was lying down in the symptoms started so there is a distinct possibility that this could be reflux/heartburn.  The discomfort does feel similar to his previous anginal symptoms so I also believe further evaluation is warranted.  His ECG does not show acute ST-T wave changes.    Would keep him in the hospital, and check a couple more troponins.  If he is pain-free, and troponins are negative and walking up and about, I think he can go home tomorrow morning and come back for a stress test.  If his troponins change, he has recurrent symptoms or EKG changes, will need to stay for coronary angiography.    We will also add a proton pump inhibitor.       History of Present Illness/Subjective    Mr. Koko Ronquillo is a 76 year old male with known coronary artery disease.  He has risk factors of hypertension, diabetes, hyperlipidemia, and quit smoking 15 years ago.  His father had a heart attack and  in his 50s.    The patient had stents placed in his right coronary artery in 2019.  He did have an elevated LVEDP at that time.  He has done well since that time and his stents were preceded by an abnormal stress test and some chest discomfort which was also described as a burning sensation.    Yesterday he had some chips and salsa and he does not usually eat spicy food.  He laid down to watch some TV and developed a burning sensation in his chest, right in the center.  He had a slight sensation of shortness of breath but no nausea, vomiting or diaphoresis.  He tried some Tums with limited benefit.  He tried a nitroglycerin which took the edge off of it but did not take the pain away.  He  decided to come into the emergency department.  While he was in the emergency department the discomfort gradually abated and it has not returned.  He does not recall getting any nitroglycerin once he got to the emergency department.    He is not particularly active in part because of back discomfort.  He can walk for about 10 minutes and his back starts aching then he needs to rest.  He admits to being sedentary.  He denies orthopnea, paroxysmal nocturnal dyspnea, peripheral edema, syncope or near syncopal episodes.  He does have occasional skipped beats.    He is diabetic and takes Metformin.  He has hypertension, takes medications for lipid lowering, and has a family history of premature coronary artery disease with his father having a heart attack and dying in his 50s.  Patient quit smoking about 15 years ago.    Patient is a retired .  He is  and has several children and a whole bunch of grandchildren.    [place] he currently lives in [place].  He is looking for a new primary care doctor.      Clinically Significant Risk Factors Present on Admission           # Platelet Defect: home medication list includes an antiplatelet medication            ECG: Personally reviewed.  Sinus rhythm with no acute ST-T wave changes.    LOCATION: [facility]  DATE/TIME: 1/1/2022 2:32 AM     INDICATION: chest pain  COMPARISON: 02/07/2019  IMPRESSION: Stable cardiomediastinal silhouette. Atherosclerotic, ectatic aorta. Partial visualization of aortic stent graft in the lower chest and upper abdomen. Basilar interstitial prominence. Mild reticulonodular opacity right upper lung. Infectious   or inflammatory etiology not excluded. Follow-up to confirm resolution recommended to exclude pulmonary nodules.     Physical Examination Review of Systems   /65   Pulse 71   Temp 97.5  F (36.4  C) (Oral)   Resp 13   Wt 104.3 kg (230 lb)   SpO2 96%    BMI 33.00 kg/m    Body mass index is 33 kg/m .  Wt Readings from Last 3 Encounters:   01/01/22 104.3 kg (230 lb)   03/29/21 105.2 kg (232 lb)   03/04/20 105.7 kg (233 lb)     General Appearance:   Alert, cooperative and in no acute distress.   ENT/Mouth: Patient wearing a mask.      EYES:  no scleral icterus, normal conjunctivae   Neck: JVP normal. No Hepatojugular reflux. Thyroid not visualized.   Chest/Lungs:   Lungs are clear to auscultation, equal chest wall expansion.   Cardiovascular:   S1, S2 without murmur ,clicks or rubs.  Distant heart sounds.  Brachial, radial  pulses are intact and symetric.  Left posterior tibial pulses diminished when compared to the right.  No carotid bruits noted   Abdomen:  Nontender. BS+. No bruits.   Extremities: No cyanosis, clubbing and very mild pretibial edema   Skin: no xanthelasma, warm.  Multiple tattoos.   Neurologic: normal arm movement bilateral, no tremors     Psychiatric: Appropriate affect.      Enc Vitals  BP: 127/65  Pulse: 71  Resp: 13  Temp: 97.5  F (36.4  C)  Temp src: Oral  SpO2: 96 %  Weight: 104.3 kg (230 lb)                                           Medical History  Surgical History Family History Social History   Past Medical History:   Diagnosis Date     Anxiety      Atrial fibrillation (H)      Chest pain made worse by breathing 4/21/2016     CIS (carcinoma in situ of bladder) 8/17/2017     Fatigue 5/19/2016     Hematuria 3/1/2017     HLD (hyperlipidemia)      HTN (hypertension)      Psoriasis      RA (rheumatoid arthritis) (H)      Sleep apnea     does not use CPAP    Past Surgical History:   Procedure Laterality Date     ABDOMINAL AORTIC ANEURYSM REPAIR  1995     CV CORONARY ANGIOGRAM N/A 2/8/2019    Procedure: Coronary Angiogram;  Surgeon: El De Oliveira MD;  Location: [facility];  Service: Cardiology     CYSTOSCOPY, TRANSURETHRAL RESECTION (TUR) TUMOR BLADDER, COMBINED N/A 3/1/2017    Procedure: CYSTOSCOPY BLADDER BIOPSY AND FULGURATION;   Surgeon: Kostas Smith MD;  Location: [facility] OR;  Service:      HERNIA REPAIR      with nesh     IR MISCELLANEOUS PROCEDURE  2007     IR THORACIC AORTOGRAM  2007     OTHER SURGICAL HISTORY      aortic bypass     OTHER SURGICAL HISTORY      nasal cauterization    Family History   Problem Relation Age of Onset     Acute Myocardial Infarction Father 58.00    Social History     Socioeconomic History     Marital status:      Spouse name: Not on file     Number of children: Not on file     Years of education: Not on file     Highest education level: Not on file   Occupational History     Not on file   Tobacco Use     Smoking status: Former Smoker     Quit date: 2007     Years since quittin.8     Smokeless tobacco: Never Used   Substance and Sexual Activity     Alcohol use: Yes     Comment: Alcoholic Drinks/day: occasional     Drug use: No     Sexual activity: Not on file   Other Topics Concern     Not on file   Social History Narrative     Not on file     Social Determinants of Health     Financial Resource Strain: Not on file   Food Insecurity: Not on file   Transportation Needs: Not on file   Physical Activity: Not on file   Stress: Not on file   Social Connections: Not on file   Intimate Partner Violence: Not on file   Housing Stability: Not on file          Medications  Allergies   Current Outpatient Medications   Medication Sig Dispense Refill     albuterol (PROAIR HFA;PROVENTIL HFA;VENTOLIN HFA) 90 mcg/actuation inhaler [ALBUTEROL (PROAIR HFA;PROVENTIL HFA;VENTOLIN HFA) 90 MCG/ACTUATION INHALER] Inhale 1 puff daily as needed.       aspirin (ASA) 81 MG EC tablet Take 81 mg by mouth daily       benzonatate (TESSALON) 100 MG capsule Take 100-200 mg by mouth 3 times daily as needed for cough       betamethasone dipropionate (DIPROLENE) 0.05 % ointment [BETAMETHASONE DIPROPIONATE (DIPROLENE) 0.05 % OINTMENT] APPLY TO AFFECTED AREA SPARINGLY TWICE DAILY 45 g 0     blood  glucose test strips [BLOOD GLUCOSE TEST STRIPS] Use 1 each As Directed 2 (two) times a day. Dispense brand per patient's insurance at pharmacy discretion. 180 strip 1     ciclopirox 0.77 % gel [CICLOPIROX 0.77 % GEL] Apply topically as needed.              CLOBETASOL PROPIONATE, BULK, MISC [CLOBETASOL PROPIONATE, BULK, MISC] Use 1 application As Directed as needed.       clopidogrel (PLAVIX) 75 MG tablet TAKE 1 TABLET(75 MG) BY MOUTH DAILY 90 tablet 1     diltiazem ER COATED BEADS (CARDIZEM CD/CARTIA XT) 120 MG 24 hr capsule Take 1 capsule (120 mg) by mouth daily 90 capsule 1     folic acid (FOLVITE) 1 MG tablet [FOLIC ACID (FOLVITE) 1 MG TABLET] Take 1 tablet by mouth every day. Do not take the day you take methotrexate        4     generic lancets [GENERIC LANCETS] Use 1 each As Directed 4 (four) times a day. 100 each 6     hydrochlorothiazide (HYDRODIURIL) 12.5 MG tablet Take 1 tablet (12.5 mg) by mouth daily 90 tablet 1     hydrocortisone 2.5 % cream [HYDROCORTISONE 2.5 % CREAM] Apply 1 application topically as needed.             ketoconazole (NIZORAL) 2 % shampoo [KETOCONAZOLE (NIZORAL) 2 % SHAMPOO] Apply topically as needed.       losartan (COZAAR) 100 MG tablet Take 1 tablet (100 mg) by mouth daily 90 tablet 1     metFORMIN (GLUCOPHAGE) 500 MG tablet [METFORMIN (GLUCOPHAGE) 500 MG TABLET] TAKE 1 TABLET BY MOUTH EVERY MORNING AND 2 TABLETS AT NIGHT. 270 tablet 3     methotrexate 2.5 MG tablet [METHOTREXATE 2.5 MG TABLET] Take 12.5 mg by mouth once a week. On Tuesdays             metoprolol succinate (TOPROL-XL) 100 MG 24 hr tablet [METOPROLOL SUCCINATE (TOPROL-XL) 100 MG 24 HR TABLET] TAKE 1 TABLET(100 MG) BY MOUTH DAILY 90 tablet 2     nitroglycerin (NITROSTAT) 0.4 MG SL tablet [NITROGLYCERIN (NITROSTAT) 0.4 MG SL TABLET] Place 1 tablet (0.4 mg total) under the tongue every 5 (five) minutes as needed for chest pain. 20 tablet 1     pravastatin (PRAVACHOL) 80 MG tablet [PRAVASTATIN (PRAVACHOL) 80 MG TABLET]  Take 1 tablet (80 mg total) by mouth at bedtime. 90 tablet 2     prednisoLONE acetate (PRED FORTE) 1 % ophthalmic suspension Place 1 drop into the right eye 4 times daily For one week after procedure       tadalafil (CIALIS) 20 MG tablet [TADALAFIL (CIALIS) 20 MG TABLET] Take as directed.       tamsulosin (FLOMAX) 0.4 mg cap [TAMSULOSIN (FLOMAX) 0.4 MG CAP] Take 1 capsule (0.4 mg total) by mouth 2 (two) times a day. 180 capsule 0    No Known Allergies      Lab Results    Chemistry/lipid CBC Cardiac Enzymes/BNP/TSH/INR   Lab Results   Component Value Date    CHOL 132 02/28/2020    HDL 33 (L) 02/28/2020    TRIG 172 (H) 02/28/2020    BUN 12 01/01/2022     01/01/2022    CO2 28 01/01/2022    Lab Results   Component Value Date    WBC 8.0 01/01/2022    HGB 13.8 01/01/2022    HCT 40.5 01/01/2022    MCV 93 01/01/2022     01/01/2022    Lab Results   Component Value Date    TROPONINI 0.26 01/01/2022    INR 1.04 02/07/2019

## 2022-01-01 NOTE — CONSULTS
[facility]  [phone]          Assessment/Recommendations   Patient with known coronary artery disease, status post PCI of 2 areas in his right coronary artery a couple of years ago.  He has recurrent symptoms of burning in his chest.  He had eaten some spicy salsa which she rarely does earlier that day and was lying down in the symptoms started so there is a distinct possibility that this could be reflux/heartburn.  The discomfort does feel similar to his previous anginal symptoms so I also believe further evaluation is warranted.  His ECG does not show acute ST-T wave changes.    Would keep him in the hospital, and check a couple more troponins.  If he is pain-free, and troponins are negative and walking up and about, I think he can go home tomorrow morning and come back for a stress test.  If his troponins change, he has recurrent symptoms or EKG changes, will need to stay for coronary angiography.    We will also add a proton pump inhibitor.       History of Present Illness/Subjective    Mr. Koko Ronquillo is a 76 year old male with known coronary artery disease.  He has risk factors of hypertension, diabetes, hyperlipidemia, and quit smoking 15 years ago.  His father had a heart attack and  in his 50s.    The patient had stents placed in his right coronary artery in 2019.  He did have an elevated LVEDP at that time.  He has done well since that time and his stents were preceded by an abnormal stress test and some chest discomfort which was also described as a burning sensation.    Yesterday he had some chips and salsa and he does not usually eat spicy food.  He laid down to watch some TV and developed a burning sensation in his chest, right in the center.  He had a slight sensation of shortness of breath but no nausea, vomiting or diaphoresis.  He tried some Tums with limited benefit.  He tried a nitroglycerin which took the edge off of it but did not take the pain away.  He  decided to come into the emergency department.  While he was in the emergency department the discomfort gradually abated and it has not returned.  He does not recall getting any nitroglycerin once he got to the emergency department.    He is not particularly active in part because of back discomfort.  He can walk for about 10 minutes and his back starts aching then he needs to rest.  He admits to being sedentary.  He denies orthopnea, paroxysmal nocturnal dyspnea, peripheral edema, syncope or near syncopal episodes.  He does have occasional skipped beats.    He is diabetic and takes Metformin.  He has hypertension, takes medications for lipid lowering, and has a family history of premature coronary artery disease with his father having a heart attack and dying in his 50s.  Patient quit smoking about 15 years ago.    Patient is a retired .  He is  and has several children and a whole bunch of grandchildren.    [place] he currently lives in [place].  He is looking for a new primary care doctor.      Clinically Significant Risk Factors Present on Admission           # Platelet Defect: home medication list includes an antiplatelet medication            ECG: Personally reviewed.  Sinus rhythm with no acute ST-T wave changes.    LOCATION: [facility]  DATE/TIME: 1/1/2022 2:32 AM     INDICATION: chest pain  COMPARISON: 02/07/2019  IMPRESSION: Stable cardiomediastinal silhouette. Atherosclerotic, ectatic aorta. Partial visualization of aortic stent graft in the lower chest and upper abdomen. Basilar interstitial prominence. Mild reticulonodular opacity right upper lung. Infectious   or inflammatory etiology not excluded. Follow-up to confirm resolution recommended to exclude pulmonary nodules.     Physical Examination Review of Systems   /65   Pulse 71   Temp 97.5  F (36.4  C) (Oral)   Resp 13   Wt 104.3 kg (230 lb)   SpO2 96%    BMI 33.00 kg/m    Body mass index is 33 kg/m .  Wt Readings from Last 3 Encounters:   01/01/22 104.3 kg (230 lb)   03/29/21 105.2 kg (232 lb)   03/04/20 105.7 kg (233 lb)     General Appearance:   Alert, cooperative and in no acute distress.   ENT/Mouth: Patient wearing a mask.      EYES:  no scleral icterus, normal conjunctivae   Neck: JVP normal. No Hepatojugular reflux. Thyroid not visualized.   Chest/Lungs:   Lungs are clear to auscultation, equal chest wall expansion.   Cardiovascular:   S1, S2 without murmur ,clicks or rubs.  Distant heart sounds.  Brachial, radial  pulses are intact and symetric.  Left posterior tibial pulses diminished when compared to the right.  No carotid bruits noted   Abdomen:  Nontender. BS+. No bruits.   Extremities: No cyanosis, clubbing and very mild pretibial edema   Skin: no xanthelasma, warm.  Multiple tattoos.   Neurologic: normal arm movement bilateral, no tremors     Psychiatric: Appropriate affect.      Enc Vitals  BP: 127/65  Pulse: 71  Resp: 13  Temp: 97.5  F (36.4  C)  Temp src: Oral  SpO2: 96 %  Weight: 104.3 kg (230 lb)                                           Medical History  Surgical History Family History Social History   Past Medical History:   Diagnosis Date     Anxiety      Atrial fibrillation (H)      Chest pain made worse by breathing 4/21/2016     CIS (carcinoma in situ of bladder) 8/17/2017     Fatigue 5/19/2016     Hematuria 3/1/2017     HLD (hyperlipidemia)      HTN (hypertension)      Psoriasis      RA (rheumatoid arthritis) (H)      Sleep apnea     does not use CPAP    Past Surgical History:   Procedure Laterality Date     ABDOMINAL AORTIC ANEURYSM REPAIR  1995     CV CORONARY ANGIOGRAM N/A 2/8/2019    Procedure: Coronary Angiogram;  Surgeon: El De Oliveira MD;  Location: [facility];  Service: Cardiology     CYSTOSCOPY, TRANSURETHRAL RESECTION (TUR) TUMOR BLADDER, COMBINED N/A 3/1/2017    Procedure: CYSTOSCOPY BLADDER BIOPSY AND FULGURATION;   Surgeon: Kostas Smith MD;  Location: [facility] OR;  Service:      HERNIA REPAIR      with nesh     IR MISCELLANEOUS PROCEDURE  2007     IR THORACIC AORTOGRAM  2007     OTHER SURGICAL HISTORY      aortic bypass     OTHER SURGICAL HISTORY      nasal cauterization    Family History   Problem Relation Age of Onset     Acute Myocardial Infarction Father 58.00    Social History     Socioeconomic History     Marital status:      Spouse name: Not on file     Number of children: Not on file     Years of education: Not on file     Highest education level: Not on file   Occupational History     Not on file   Tobacco Use     Smoking status: Former Smoker     Quit date: 2007     Years since quittin.8     Smokeless tobacco: Never Used   Substance and Sexual Activity     Alcohol use: Yes     Comment: Alcoholic Drinks/day: occasional     Drug use: No     Sexual activity: Not on file   Other Topics Concern     Not on file   Social History Narrative     Not on file     Social Determinants of Health     Financial Resource Strain: Not on file   Food Insecurity: Not on file   Transportation Needs: Not on file   Physical Activity: Not on file   Stress: Not on file   Social Connections: Not on file   Intimate Partner Violence: Not on file   Housing Stability: Not on file          Medications  Allergies   Current Outpatient Medications   Medication Sig Dispense Refill     albuterol (PROAIR HFA;PROVENTIL HFA;VENTOLIN HFA) 90 mcg/actuation inhaler [ALBUTEROL (PROAIR HFA;PROVENTIL HFA;VENTOLIN HFA) 90 MCG/ACTUATION INHALER] Inhale 1 puff daily as needed.       aspirin (ASA) 81 MG EC tablet Take 81 mg by mouth daily       benzonatate (TESSALON) 100 MG capsule Take 100-200 mg by mouth 3 times daily as needed for cough       betamethasone dipropionate (DIPROLENE) 0.05 % ointment [BETAMETHASONE DIPROPIONATE (DIPROLENE) 0.05 % OINTMENT] APPLY TO AFFECTED AREA SPARINGLY TWICE DAILY 45 g 0     blood  glucose test strips [BLOOD GLUCOSE TEST STRIPS] Use 1 each As Directed 2 (two) times a day. Dispense brand per patient's insurance at pharmacy discretion. 180 strip 1     ciclopirox 0.77 % gel [CICLOPIROX 0.77 % GEL] Apply topically as needed.              CLOBETASOL PROPIONATE, BULK, MISC [CLOBETASOL PROPIONATE, BULK, MISC] Use 1 application As Directed as needed.       clopidogrel (PLAVIX) 75 MG tablet TAKE 1 TABLET(75 MG) BY MOUTH DAILY 90 tablet 1     diltiazem ER COATED BEADS (CARDIZEM CD/CARTIA XT) 120 MG 24 hr capsule Take 1 capsule (120 mg) by mouth daily 90 capsule 1     folic acid (FOLVITE) 1 MG tablet [FOLIC ACID (FOLVITE) 1 MG TABLET] Take 1 tablet by mouth every day. Do not take the day you take methotrexate        4     generic lancets [GENERIC LANCETS] Use 1 each As Directed 4 (four) times a day. 100 each 6     hydrochlorothiazide (HYDRODIURIL) 12.5 MG tablet Take 1 tablet (12.5 mg) by mouth daily 90 tablet 1     hydrocortisone 2.5 % cream [HYDROCORTISONE 2.5 % CREAM] Apply 1 application topically as needed.             ketoconazole (NIZORAL) 2 % shampoo [KETOCONAZOLE (NIZORAL) 2 % SHAMPOO] Apply topically as needed.       losartan (COZAAR) 100 MG tablet Take 1 tablet (100 mg) by mouth daily 90 tablet 1     metFORMIN (GLUCOPHAGE) 500 MG tablet [METFORMIN (GLUCOPHAGE) 500 MG TABLET] TAKE 1 TABLET BY MOUTH EVERY MORNING AND 2 TABLETS AT NIGHT. 270 tablet 3     methotrexate 2.5 MG tablet [METHOTREXATE 2.5 MG TABLET] Take 12.5 mg by mouth once a week. On Tuesdays             metoprolol succinate (TOPROL-XL) 100 MG 24 hr tablet [METOPROLOL SUCCINATE (TOPROL-XL) 100 MG 24 HR TABLET] TAKE 1 TABLET(100 MG) BY MOUTH DAILY 90 tablet 2     nitroglycerin (NITROSTAT) 0.4 MG SL tablet [NITROGLYCERIN (NITROSTAT) 0.4 MG SL TABLET] Place 1 tablet (0.4 mg total) under the tongue every 5 (five) minutes as needed for chest pain. 20 tablet 1     pravastatin (PRAVACHOL) 80 MG tablet [PRAVASTATIN (PRAVACHOL) 80 MG TABLET]  Take 1 tablet (80 mg total) by mouth at bedtime. 90 tablet 2     prednisoLONE acetate (PRED FORTE) 1 % ophthalmic suspension Place 1 drop into the right eye 4 times daily For one week after procedure       tadalafil (CIALIS) 20 MG tablet [TADALAFIL (CIALIS) 20 MG TABLET] Take as directed.       tamsulosin (FLOMAX) 0.4 mg cap [TAMSULOSIN (FLOMAX) 0.4 MG CAP] Take 1 capsule (0.4 mg total) by mouth 2 (two) times a day. 180 capsule 0    No Known Allergies      Lab Results    Chemistry/lipid CBC Cardiac Enzymes/BNP/TSH/INR   Lab Results   Component Value Date    CHOL 132 02/28/2020    HDL 33 (L) 02/28/2020    TRIG 172 (H) 02/28/2020    BUN 12 01/01/2022     01/01/2022    CO2 28 01/01/2022    Lab Results   Component Value Date    WBC 8.0 01/01/2022    HGB 13.8 01/01/2022    HCT 40.5 01/01/2022    MCV 93 01/01/2022     01/01/2022    Lab Results   Component Value Date    TROPONINI 0.26 01/01/2022    INR 1.04 02/07/2019

## 2022-01-01 NOTE — PHARMACY-ADMISSION MEDICATION HISTORY
Pharmacy Note - Admission Medication History    Pertinent Provider Information: Utilized patient's home med list. Patient states he takes 2 folic acid 1mg tablets a day instead of 1 tablet daily as prescribed (does not take on methotrexate days).   ______________________________________________________________________    Prior To Admission (PTA) med list completed and updated in EMR.       PTA Med List   Medication Sig Last Dose     albuterol (PROAIR HFA;PROVENTIL HFA;VENTOLIN HFA) 90 mcg/actuation inhaler [ALBUTEROL (PROAIR HFA;PROVENTIL HFA;VENTOLIN HFA) 90 MCG/ACTUATION INHALER] Inhale 1 puff daily as needed. More than a month at prn     clopidogrel (PLAVIX) 75 MG tablet TAKE 1 TABLET(75 MG) BY MOUTH DAILY 12/31/2021 at Unknown time     diltiazem ER COATED BEADS (CARDIZEM CD/CARTIA XT) 120 MG 24 hr capsule Take 1 capsule (120 mg) by mouth daily 12/31/2021 at Unknown time     folic acid (FOLVITE) 1 MG tablet Take 2 mg by mouth daily Do not take on methotrexate days (Tuesday) 12/31/2021 at Unknown time     hydrochlorothiazide (HYDRODIURIL) 12.5 MG tablet Take 1 tablet (12.5 mg) by mouth daily 12/31/2021 at Unknown time     losartan (COZAAR) 100 MG tablet Take 1 tablet (100 mg) by mouth daily 12/31/2021 at Unknown time     metFORMIN (GLUCOPHAGE) 500 MG tablet [METFORMIN (GLUCOPHAGE) 500 MG TABLET] TAKE 1 TABLET BY MOUTH EVERY MORNING AND 2 TABLETS AT NIGHT. 12/31/2021 at Unknown time     methotrexate 2.5 MG tablet Take 12.5 mg by mouth once a week Tuesdays 12/28/2021     metoprolol succinate (TOPROL-XL) 100 MG 24 hr tablet [METOPROLOL SUCCINATE (TOPROL-XL) 100 MG 24 HR TABLET] TAKE 1 TABLET(100 MG) BY MOUTH DAILY 12/31/2021 at Unknown time     nitroglycerin (NITROSTAT) 0.4 MG SL tablet [NITROGLYCERIN (NITROSTAT) 0.4 MG SL TABLET] Place 1 tablet (0.4 mg total) under the tongue every 5 (five) minutes as needed for chest pain. Unknown at prn     pravastatin (PRAVACHOL) 80 MG tablet [PRAVASTATIN (PRAVACHOL) 80 MG  TABLET] Take 1 tablet (80 mg total) by mouth at bedtime. 12/31/2021 at Unknown time     tamsulosin (FLOMAX) 0.4 mg cap [TAMSULOSIN (FLOMAX) 0.4 MG CAP] Take 1 capsule (0.4 mg total) by mouth 2 (two) times a day. 12/31/2021 at Unknown time       Information source(s): Patient and CareEverywhere/SureScripts  Method of interview communication: in-person    Summary of Changes to PTA Med List  New: none  Discontinued: asa, all topicals  Changed: Pt takes 2 folic acid tablets daily instead of one tablet daily as prescribed.    Patient was asked about OTC/herbal products specifically.  PTA med list reflects this.    In the past week, patient estimated taking medication this percent of the time:  greater than 90%.    Allergies were reviewed, assessed, and updated with the patient.      Patient does not use any multi-dose medications prior to admission.    The information provided in this note is only as accurate as the sources available at the time of the update(s).    Thank you for the opportunity to participate in the care of this patient.    Oly Gaspar [facility]  1/1/2022 9:29 AM

## 2022-01-01 NOTE — PROGRESS NOTES
Troponin is positive. Patient does not have any symptoms but this is consistent with unstable angina.    We will start intravenous heparin and plan coronary angiography with the possibility of percutaneous intervention on Monday, but sooner if symptoms return and become unstable.

## 2022-01-01 NOTE — PROGRESS NOTES
Note: Patient reevaluated and continues to be comfortable with no chest pain.  He is eating well and is eager to go home.  Unfortunately, I ordered repeat troponins which have been trending upwards.  Dr. Fong notified and I appreciate his input and note.  Discussed in detail with the patient and his spouse.  Patient is committed to staying in the hospital until cardiovascular issues are resolved.  Heparin to be initiated at the discretion of Dr. Fong.

## 2022-01-01 NOTE — PLAN OF CARE
"Problem: Adult Inpatient Plan of Care  Goal: Plan of Care Review  Outcome: Change based on patient need/priority    Pt denies pain. Observed resting comfortably in room with spouse at bedside. Call light within reach. Pt initially refusing BG checks this evening. States \"I don't want any pokes.\" This RN provided education on the importance of regular glucose monitoring, pt agreeable and  at dinner. IV heparin infusing at 1200 units/hr. Tele sinus buddy.   "

## 2022-01-01 NOTE — ED NOTES
Ambulated to and from bathroom again. Wife at the bedside.  Has home medications with bottles with her.  Will rehook up to vital machine and call report to the floor.    Kiara Sood RN 1/1/2022 11:55 AM

## 2022-01-02 LAB
HOLD SPECIMEN: NORMAL
HOLD SPECIMEN: NORMAL
TROPONIN I SERPL-MCNC: 0.18 NG/ML (ref 0–0.29)
TROPONIN I SERPL-MCNC: 0.19 NG/ML (ref 0–0.29)
UFH PPP CHRO-ACNC: 0.25 IU/ML
UFH PPP CHRO-ACNC: 0.59 IU/ML
UFH PPP CHRO-ACNC: <=0.1 IU/ML

## 2022-01-02 PROCEDURE — 85520 HEPARIN ASSAY: CPT | Performed by: FAMILY MEDICINE

## 2022-01-02 PROCEDURE — 83718 ASSAY OF LIPOPROTEIN: CPT | Performed by: INTERNAL MEDICINE

## 2022-01-02 PROCEDURE — 250N000013 HC RX MED GY IP 250 OP 250 PS 637: Performed by: FAMILY MEDICINE

## 2022-01-02 PROCEDURE — 99232 SBSQ HOSP IP/OBS MODERATE 35: CPT | Performed by: FAMILY MEDICINE

## 2022-01-02 PROCEDURE — 84484 ASSAY OF TROPONIN QUANT: CPT | Mod: 91 | Performed by: FAMILY MEDICINE

## 2022-01-02 PROCEDURE — 250N000011 HC RX IP 250 OP 636: Performed by: INTERNAL MEDICINE

## 2022-01-02 PROCEDURE — 85520 HEPARIN ASSAY: CPT | Performed by: INTERNAL MEDICINE

## 2022-01-02 PROCEDURE — 99231 SBSQ HOSP IP/OBS SF/LOW 25: CPT | Performed by: INTERNAL MEDICINE

## 2022-01-02 PROCEDURE — 210N000002 HC R&B HEART CARE

## 2022-01-02 PROCEDURE — 36415 COLL VENOUS BLD VENIPUNCTURE: CPT | Performed by: FAMILY MEDICINE

## 2022-01-02 PROCEDURE — 84484 ASSAY OF TROPONIN QUANT: CPT | Performed by: FAMILY MEDICINE

## 2022-01-02 PROCEDURE — 250N000013 HC RX MED GY IP 250 OP 250 PS 637: Performed by: INTERNAL MEDICINE

## 2022-01-02 PROCEDURE — 36415 COLL VENOUS BLD VENIPUNCTURE: CPT | Performed by: INTERNAL MEDICINE

## 2022-01-02 RX ADMIN — HYDROCHLOROTHIAZIDE 12.5 MG: 12.5 CAPSULE ORAL at 08:56

## 2022-01-02 RX ADMIN — LOSARTAN POTASSIUM 100 MG: 50 TABLET, FILM COATED ORAL at 08:56

## 2022-01-02 RX ADMIN — METOPROLOL SUCCINATE 100 MG: 100 TABLET, EXTENDED RELEASE ORAL at 08:56

## 2022-01-02 RX ADMIN — HEPARIN SODIUM 1500 UNITS/HR: 10000 INJECTION, SOLUTION INTRAVENOUS at 04:30

## 2022-01-02 RX ADMIN — TAMSULOSIN HYDROCHLORIDE 0.4 MG: 0.4 CAPSULE ORAL at 08:56

## 2022-01-02 RX ADMIN — TAMSULOSIN HYDROCHLORIDE 0.4 MG: 0.4 CAPSULE ORAL at 20:42

## 2022-01-02 RX ADMIN — FAMOTIDINE 10 MG: 10 TABLET ORAL at 09:01

## 2022-01-02 RX ADMIN — METFORMIN HYDROCHLORIDE 1000 MG: 500 TABLET ORAL at 17:49

## 2022-01-02 RX ADMIN — DILTIAZEM HYDROCHLORIDE 120 MG: 120 CAPSULE, COATED, EXTENDED RELEASE ORAL at 08:56

## 2022-01-02 RX ADMIN — CLOPIDOGREL BISULFATE 75 MG: 75 TABLET ORAL at 08:56

## 2022-01-02 RX ADMIN — FAMOTIDINE 10 MG: 10 TABLET ORAL at 20:42

## 2022-01-02 RX ADMIN — METFORMIN HYDROCHLORIDE 500 MG: 500 TABLET ORAL at 08:56

## 2022-01-02 ASSESSMENT — ACTIVITIES OF DAILY LIVING (ADL)
ADLS_ACUITY_SCORE: 4

## 2022-01-02 NOTE — PLAN OF CARE
Problem: Hypertension Comorbidity  Goal: Blood Pressure in Desired Range  Outcome: No Change     Problem: Adult Inpatient Plan of Care  Goal: Readiness for Transition of Care  Outcome: No Change     Patient is vitally stable. Heparin drip going. Patient appetite is good. Will have angiogram tomorrow.

## 2022-01-02 NOTE — PROGRESS NOTES
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   Patient had elevated troponins, is currently on heparin drip, and is awaiting cardiac work-up.             Assessment and Plan:     Chest pain  --Currently resolved  --Troponins trending down  --Continue heparin drip per cardiology  --Await further work-up    DM2  --Stable  --Continue current oral medications    RA  --Continue methotrexate    HTN  --Stable  --Continue metoprolol, diltiazem and losartan    BPH  --Continue tamsulosin      Disposition: Most likely to [facility] tomorrow      Benji Monet MD  Hospitalist  [facility]  Phone: #[phone]         Subjective:     Interval improvement.  No chest pain.  Resting quietly.      Physical Exam/Objective:  VS: 96.8-/65-16    Constitutional: awake, alert, cooperative, no apparent distress,  Respiratory: Lungs clear, no adventitious sounds  Cardiovascular: RRR, Nl S1, S2  Skin: Pink and dry  Neurologic: Moves all four extremities, pupils equal  Neuropsychiatric:  Alert, Ox3    Medications:   Plavix, diltiazem, Pepcid, Hi-Cor thiazide, NovoLog, losartan, Metformin, methotrexate, metoprolol, Flomax    Labs: Troponin this morning 0.18 (decreased)        Current Diet  Orders Placed This Encounter      Low Saturated Fat Na <2400 mg    Supplements  None

## 2022-01-02 NOTE — PROGRESS NOTES
Patient denies any further chest discomfort or heartburn symptoms.  Breathing is been comfortable overnight.  Troponins are mildly elevated indicative of a cardiac etiology of his symptomatology.    Vital signs reviewed.    Chest is clear to auscultation.    Cardiovascular exam: S1-S2 without murmur clicks or rubs.      Patient with symptoms of chest burning, abnormal troponins consistent with unstable angina.  He is on intravenous heparin drip and asymptomatic.  We will plan coronary angiography tomorrow with transfer to [facility] either today or tomorrow morning.  Would continue heparin drip.

## 2022-01-02 NOTE — PROGRESS NOTES
Cross cover hospitalist note    Informed of elevated troponin at 0.33.  6 hours prior to that was 0.37.    Cardiology has been consulted and patient is on a heparin drip    Sounds like patient is currently stable    Plan: Repeat troponin in 6 and 12 hours  Reevaluate by cardiology and primary hospitalist in the morning    Cem Pritchett MD  Cross cover hospitalist   [facility] Medicine Service

## 2022-01-02 NOTE — PLAN OF CARE
Problem: Adult Inpatient Plan of Care  Goal: Plan of Care Review  Outcome: Improving  Flowsheets (Taken 1/2/2022 9499)  Plan of Care Reviewed With: patient  Outcome Summary: Pts Troponin is getting better 0.19, denies chest pain. Heparin drip infusing at 1500units/hr, next anti XA checks for 10:30am.  Progress: improving

## 2022-01-02 NOTE — UTILIZATION REVIEW
Inpatient appropriate    Admission Status; Secondary Review Determination       Under the authority of the Utilization Management Committee, the utilization review process indicated a secondary review on the above patient. The review outcome is based on review of the medical records, discussions with staff, and applying clinical experience noted on the date of the review.     (x) Inpatient Status Appropriate - This patient's medical care is consistent with medical management for inpatient care and reasonable inpatient medical practice.     RATIONALE FOR DETERMINATION   76 year old male with a pertinent history of CAD s/p stents x2, HLD, HTN, atrial fibrillation, DM II, RA, AAA, who presents to this ED for evaluation of chest pain.  Patient found to have elevated troponin.  Cardiologist recommend continue with heparin infusion and  will transfer to another facility for coronary angiogram.  At the time of admission with the information available to the attending physician more than 2 nights Hospital complex care was anticipated, based on patient risk of adverse outcome if treated as outpatient and complex care required. Inpatient admission is appropriate based on the Medicare guidelines.     The information on this document is developed by the utilization review team in order for the business office to ensure compliance. This only denotes the appropriateness of proper admission status and does not reflect the quality of care rendered.   The definitions of Inpatient Status and Observation Status used in making the determination above are those provided in the CMS Coverage Manual, Chapter 1 and Chapter 6, section 70.4.   Sincerely,   David Portillo MD  Utilization Review  Physician Advisor  [facility].

## 2022-01-03 ENCOUNTER — APPOINTMENT (OUTPATIENT)
Dept: CARDIOLOGY | Facility: HOSPITAL | Age: 77
DRG: 247 | End: 2022-01-03
Attending: INTERNAL MEDICINE
Payer: COMMERCIAL

## 2022-01-03 ENCOUNTER — HOSPITAL ENCOUNTER (INPATIENT)
Facility: HOSPITAL | Age: 77
LOS: 1 days | Discharge: HOME | DRG: 247 | End: 2022-01-04
Attending: FAMILY MEDICINE | Admitting: INTERNAL MEDICINE
Payer: COMMERCIAL

## 2022-01-03 VITALS
SYSTOLIC BLOOD PRESSURE: 141 MMHG | TEMPERATURE: 97.6 F | HEART RATE: 54 BPM | DIASTOLIC BLOOD PRESSURE: 66 MMHG | HEIGHT: 70 IN | OXYGEN SATURATION: 95 % | RESPIRATION RATE: 18 BRPM | WEIGHT: 234.4 LBS | BODY MASS INDEX: 33.56 KG/M2

## 2022-01-03 DIAGNOSIS — Z98.61 PERCUTANEOUS TRANSLUMINAL CORONARY ANGIOPLASTY STATUS: ICD-10-CM

## 2022-01-03 DIAGNOSIS — E78.2 MIXED HYPERLIPIDEMIA: Primary | ICD-10-CM

## 2022-01-03 DIAGNOSIS — R07.2 PRECORDIAL PAIN: ICD-10-CM

## 2022-01-03 DIAGNOSIS — I21.4 NSTEMI (NON-ST ELEVATED MYOCARDIAL INFARCTION) (H): ICD-10-CM

## 2022-01-03 LAB
ACT BLD: 229 SECONDS (ref 74–150)
ACT BLD: 284 SECONDS (ref 74–150)
ACT BLD: 301 SECONDS (ref 74–150)
ATRIAL RATE - MUSE: 53 BPM
CHOLEST SERPL-MCNC: 132 MG/DL
DIASTOLIC BLOOD PRESSURE - MUSE: NORMAL MMHG
GLUCOSE BLDC GLUCOMTR-MCNC: 106 MG/DL (ref 70–99)
HDLC SERPL-MCNC: 27 MG/DL
HOLD SPECIMEN: NORMAL
INR PPP: 0.95 (ref 0.85–1.15)
INTERPRETATION ECG - MUSE: NORMAL
LDLC SERPL CALC-MCNC: 57 MG/DL
LVEF ECHO: NORMAL
P AXIS - MUSE: 30 DEGREES
PR INTERVAL - MUSE: 190 MS
QRS DURATION - MUSE: 84 MS
QT - MUSE: 444 MS
QTC - MUSE: 416 MS
R AXIS - MUSE: -1 DEGREES
SYSTOLIC BLOOD PRESSURE - MUSE: NORMAL MMHG
T AXIS - MUSE: 7 DEGREES
TRIGL SERPL-MCNC: 241 MG/DL
UFH PPP CHRO-ACNC: 0.15 IU/ML
UFH PPP CHRO-ACNC: 0.6 IU/ML
VENTRICULAR RATE- MUSE: 53 BPM

## 2022-01-03 PROCEDURE — 99232 SBSQ HOSP IP/OBS MODERATE 35: CPT | Performed by: FAMILY MEDICINE

## 2022-01-03 PROCEDURE — 255N000002 HC RX 255 OP 636: Performed by: FAMILY MEDICINE

## 2022-01-03 PROCEDURE — 250N000013 HC RX MED GY IP 250 OP 250 PS 637: Performed by: FAMILY MEDICINE

## 2022-01-03 PROCEDURE — 85520 HEPARIN ASSAY: CPT | Performed by: FAMILY MEDICINE

## 2022-01-03 PROCEDURE — C9600 PERC DRUG-EL COR STENT SING: HCPCS | Performed by: INTERNAL MEDICINE

## 2022-01-03 PROCEDURE — 250N000013 HC RX MED GY IP 250 OP 250 PS 637: Performed by: NURSE PRACTITIONER

## 2022-01-03 PROCEDURE — C9602 PERC D-E COR STENT ATHER S: HCPCS | Performed by: INTERNAL MEDICINE

## 2022-01-03 PROCEDURE — 99153 MOD SED SAME PHYS/QHP EA: CPT | Performed by: INTERNAL MEDICINE

## 2022-01-03 PROCEDURE — 99207 PR CDG-MDM COMPONENT: MEETS MODERATE - UP CODED: CPT | Performed by: FAMILY MEDICINE

## 2022-01-03 PROCEDURE — 255N000002 HC RX 255 OP 636: Performed by: INTERNAL MEDICINE

## 2022-01-03 PROCEDURE — 85610 PROTHROMBIN TIME: CPT | Performed by: INTERNAL MEDICINE

## 2022-01-03 PROCEDURE — C1894 INTRO/SHEATH, NON-LASER: HCPCS | Performed by: INTERNAL MEDICINE

## 2022-01-03 PROCEDURE — 85347 COAGULATION TIME ACTIVATED: CPT

## 2022-01-03 PROCEDURE — 250N000013 HC RX MED GY IP 250 OP 250 PS 637: Performed by: INTERNAL MEDICINE

## 2022-01-03 PROCEDURE — 99152 MOD SED SAME PHYS/QHP 5/>YRS: CPT | Performed by: INTERNAL MEDICINE

## 2022-01-03 PROCEDURE — C1725 CATH, TRANSLUMIN NON-LASER: HCPCS | Performed by: INTERNAL MEDICINE

## 2022-01-03 PROCEDURE — 99207 PR CDG-CODE INCORRECT PER BILLING BASED ON TIME: CPT | Performed by: FAMILY MEDICINE

## 2022-01-03 PROCEDURE — 272N000001 HC OR GENERAL SUPPLY STERILE: Performed by: INTERNAL MEDICINE

## 2022-01-03 PROCEDURE — 93458 L HRT ARTERY/VENTRICLE ANGIO: CPT | Performed by: INTERNAL MEDICINE

## 2022-01-03 PROCEDURE — 250N000011 HC RX IP 250 OP 636: Performed by: INTERNAL MEDICINE

## 2022-01-03 PROCEDURE — C1874 STENT, COATED/COV W/DEL SYS: HCPCS | Performed by: INTERNAL MEDICINE

## 2022-01-03 PROCEDURE — 93458 L HRT ARTERY/VENTRICLE ANGIO: CPT | Mod: 26 | Performed by: INTERNAL MEDICINE

## 2022-01-03 PROCEDURE — B2111ZZ FLUOROSCOPY OF MULTIPLE CORONARY ARTERIES USING LOW OSMOLAR CONTRAST: ICD-10-PCS | Performed by: INTERNAL MEDICINE

## 2022-01-03 PROCEDURE — 93005 ELECTROCARDIOGRAM TRACING: CPT

## 2022-01-03 PROCEDURE — 92928 PRQ TCAT PLMT NTRAC ST 1 LES: CPT | Mod: RC | Performed by: INTERNAL MEDICINE

## 2022-01-03 PROCEDURE — 250N000009 HC RX 250: Performed by: INTERNAL MEDICINE

## 2022-01-03 PROCEDURE — 93306 TTE W/DOPPLER COMPLETE: CPT | Mod: 26 | Performed by: INTERNAL MEDICINE

## 2022-01-03 PROCEDURE — C1769 GUIDE WIRE: HCPCS | Performed by: INTERNAL MEDICINE

## 2022-01-03 PROCEDURE — C1761 HC OR CATH, TRANS INTRA LITHO/CORONARY: HCPCS | Performed by: INTERNAL MEDICINE

## 2022-01-03 PROCEDURE — C1887 CATHETER, GUIDING: HCPCS | Performed by: INTERNAL MEDICINE

## 2022-01-03 PROCEDURE — 210N000001 HC R&B IMCU HEART CARE

## 2022-01-03 PROCEDURE — 93010 ELECTROCARDIOGRAM REPORT: CPT | Performed by: INTERNAL MEDICINE

## 2022-01-03 PROCEDURE — 99239 HOSP IP/OBS DSCHRG MGMT >30: CPT | Performed by: FAMILY MEDICINE

## 2022-01-03 PROCEDURE — 4A023N7 MEASUREMENT OF CARDIAC SAMPLING AND PRESSURE, LEFT HEART, PERCUTANEOUS APPROACH: ICD-10-PCS | Performed by: INTERNAL MEDICINE

## 2022-01-03 PROCEDURE — 999N000208 ECHOCARDIOGRAM COMPLETE

## 2022-01-03 PROCEDURE — 36415 COLL VENOUS BLD VENIPUNCTURE: CPT | Performed by: FAMILY MEDICINE

## 2022-01-03 PROCEDURE — 02F03ZZ FRAGMENTATION IN CORONARY ARTERY, ONE ARTERY, PERCUTANEOUS APPROACH: ICD-10-PCS | Performed by: INTERNAL MEDICINE

## 2022-01-03 PROCEDURE — 027134Z DILATION OF CORONARY ARTERY, TWO ARTERIES WITH DRUG-ELUTING INTRALUMINAL DEVICE, PERCUTANEOUS APPROACH: ICD-10-PCS | Performed by: INTERNAL MEDICINE

## 2022-01-03 DEVICE — STENT CORONARY DES SYNERGY XD MR US 3.50X16MM H7493941816350: Type: IMPLANTABLE DEVICE | Site: CORONARY | Status: FUNCTIONAL

## 2022-01-03 DEVICE — STENT CORONARY DES SYNERGY XD MR US 3.50X12MM H7493941812350: Type: IMPLANTABLE DEVICE | Status: FUNCTIONAL

## 2022-01-03 RX ORDER — METOPROLOL SUCCINATE 100 MG/1
100 TABLET, EXTENDED RELEASE ORAL DAILY
Status: CANCELLED | OUTPATIENT
Start: 2022-01-03

## 2022-01-03 RX ORDER — DEXTROSE MONOHYDRATE 25 G/50ML
25-50 INJECTION, SOLUTION INTRAVENOUS
Status: CANCELLED | OUTPATIENT
Start: 2022-01-03

## 2022-01-03 RX ORDER — LOSARTAN POTASSIUM 50 MG/1
100 TABLET ORAL DAILY
Status: DISCONTINUED | OUTPATIENT
Start: 2022-01-04 | End: 2022-01-04 | Stop reason: HOSPADM

## 2022-01-03 RX ORDER — ONDANSETRON 4 MG/1
4 TABLET, ORALLY DISINTEGRATING ORAL EVERY 6 HOURS PRN
Status: DISCONTINUED | OUTPATIENT
Start: 2022-01-03 | End: 2022-01-03

## 2022-01-03 RX ORDER — DIAZEPAM 5 MG
5 TABLET ORAL
Status: COMPLETED | OUTPATIENT
Start: 2022-01-03 | End: 2022-01-03

## 2022-01-03 RX ORDER — HYDROCHLOROTHIAZIDE 12.5 MG/1
12.5 CAPSULE ORAL DAILY
Status: DISCONTINUED | OUTPATIENT
Start: 2022-01-03 | End: 2022-01-04 | Stop reason: HOSPADM

## 2022-01-03 RX ORDER — NITROGLYCERIN 0.4 MG/1
0.4 TABLET SUBLINGUAL EVERY 5 MIN PRN
Status: CANCELLED | OUTPATIENT
Start: 2022-01-03

## 2022-01-03 RX ORDER — DILTIAZEM HYDROCHLORIDE 120 MG/1
120 CAPSULE, COATED, EXTENDED RELEASE ORAL DAILY
Status: DISCONTINUED | OUTPATIENT
Start: 2022-01-03 | End: 2022-01-04 | Stop reason: HOSPADM

## 2022-01-03 RX ORDER — NITROGLYCERIN 0.4 MG/1
TABLET SUBLINGUAL
Qty: 25 TABLET | Refills: 3 | Status: SHIPPED | OUTPATIENT
Start: 2022-01-03

## 2022-01-03 RX ORDER — HYDROCHLOROTHIAZIDE 12.5 MG/1
12.5 CAPSULE ORAL DAILY
Status: CANCELLED | OUTPATIENT
Start: 2022-01-03

## 2022-01-03 RX ORDER — NITROGLYCERIN 5 MG/ML
VIAL (ML) INTRAVENOUS
Status: DISCONTINUED | OUTPATIENT
Start: 2022-01-03 | End: 2022-01-03 | Stop reason: HOSPADM

## 2022-01-03 RX ORDER — FENTANYL CITRATE 50 UG/ML
25 INJECTION, SOLUTION INTRAMUSCULAR; INTRAVENOUS
Status: DISCONTINUED | OUTPATIENT
Start: 2022-01-03 | End: 2022-01-03 | Stop reason: HOSPADM

## 2022-01-03 RX ORDER — NITROGLYCERIN 0.4 MG/1
0.4 TABLET SUBLINGUAL EVERY 5 MIN PRN
Status: DISCONTINUED | OUTPATIENT
Start: 2022-01-03 | End: 2022-01-03

## 2022-01-03 RX ORDER — LOSARTAN POTASSIUM 50 MG/1
100 TABLET ORAL DAILY
Status: CANCELLED | OUTPATIENT
Start: 2022-01-03

## 2022-01-03 RX ORDER — SODIUM CHLORIDE 9 MG/ML
INJECTION, SOLUTION INTRAVENOUS CONTINUOUS
Status: ACTIVE | OUTPATIENT
Start: 2022-01-03 | End: 2022-01-03

## 2022-01-03 RX ORDER — ONDANSETRON 2 MG/ML
4 INJECTION INTRAMUSCULAR; INTRAVENOUS EVERY 6 HOURS PRN
Status: DISCONTINUED | OUTPATIENT
Start: 2022-01-03 | End: 2022-01-03

## 2022-01-03 RX ORDER — NICOTINE POLACRILEX 4 MG
15-30 LOZENGE BUCCAL
Status: DISCONTINUED | OUTPATIENT
Start: 2022-01-03 | End: 2022-01-04 | Stop reason: HOSPADM

## 2022-01-03 RX ORDER — TAMSULOSIN HYDROCHLORIDE 0.4 MG/1
0.4 CAPSULE ORAL 2 TIMES DAILY
Status: DISCONTINUED | OUTPATIENT
Start: 2022-01-03 | End: 2022-01-04 | Stop reason: HOSPADM

## 2022-01-03 RX ORDER — HEPARIN SODIUM 1000 [USP'U]/ML
INJECTION, SOLUTION INTRAVENOUS; SUBCUTANEOUS
Status: DISCONTINUED | OUTPATIENT
Start: 2022-01-03 | End: 2022-01-03 | Stop reason: HOSPADM

## 2022-01-03 RX ORDER — ACETAMINOPHEN 325 MG/1
650 TABLET ORAL EVERY 6 HOURS PRN
Status: DISCONTINUED | OUTPATIENT
Start: 2022-01-03 | End: 2022-01-04 | Stop reason: HOSPADM

## 2022-01-03 RX ORDER — ACETAMINOPHEN 325 MG/1
650 TABLET ORAL EVERY 4 HOURS PRN
Status: DISCONTINUED | OUTPATIENT
Start: 2022-01-03 | End: 2022-01-03

## 2022-01-03 RX ORDER — CLOPIDOGREL BISULFATE 75 MG/1
75 TABLET ORAL DAILY
Status: DISCONTINUED | OUTPATIENT
Start: 2022-01-04 | End: 2022-01-04 | Stop reason: HOSPADM

## 2022-01-03 RX ORDER — TAMSULOSIN HYDROCHLORIDE 0.4 MG/1
0.4 CAPSULE ORAL 2 TIMES DAILY
Status: CANCELLED | OUTPATIENT
Start: 2022-01-03

## 2022-01-03 RX ORDER — ONDANSETRON 2 MG/ML
4 INJECTION INTRAMUSCULAR; INTRAVENOUS EVERY 6 HOURS PRN
Status: DISCONTINUED | OUTPATIENT
Start: 2022-01-03 | End: 2022-01-04 | Stop reason: HOSPADM

## 2022-01-03 RX ORDER — ONDANSETRON 4 MG/1
4 TABLET, ORALLY DISINTEGRATING ORAL EVERY 6 HOURS PRN
Status: CANCELLED | OUTPATIENT
Start: 2022-01-03

## 2022-01-03 RX ORDER — ASPIRIN 81 MG/1
81 TABLET ORAL DAILY
Status: DISCONTINUED | OUTPATIENT
Start: 2022-01-04 | End: 2022-01-04 | Stop reason: HOSPADM

## 2022-01-03 RX ORDER — DEXTROSE MONOHYDRATE 25 G/50ML
25-50 INJECTION, SOLUTION INTRAVENOUS
Status: DISCONTINUED | OUTPATIENT
Start: 2022-01-03 | End: 2022-01-04 | Stop reason: HOSPADM

## 2022-01-03 RX ORDER — NALOXONE HYDROCHLORIDE 0.4 MG/ML
0.2 INJECTION, SOLUTION INTRAMUSCULAR; INTRAVENOUS; SUBCUTANEOUS
Status: ACTIVE | OUTPATIENT
Start: 2022-01-03 | End: 2022-01-03

## 2022-01-03 RX ORDER — NITROGLYCERIN 0.4 MG/1
TABLET SUBLINGUAL
Status: DISCONTINUED | OUTPATIENT
Start: 2022-01-03 | End: 2022-01-03 | Stop reason: HOSPADM

## 2022-01-03 RX ORDER — FLUMAZENIL 0.1 MG/ML
0.2 INJECTION, SOLUTION INTRAVENOUS
Status: ACTIVE | OUTPATIENT
Start: 2022-01-03 | End: 2022-01-03

## 2022-01-03 RX ORDER — METOPROLOL SUCCINATE 100 MG/1
100 TABLET, EXTENDED RELEASE ORAL DAILY
Status: DISCONTINUED | OUTPATIENT
Start: 2022-01-03 | End: 2022-01-04 | Stop reason: HOSPADM

## 2022-01-03 RX ORDER — HYDRALAZINE HYDROCHLORIDE 20 MG/ML
10 INJECTION INTRAMUSCULAR; INTRAVENOUS EVERY 4 HOURS PRN
Status: DISCONTINUED | OUTPATIENT
Start: 2022-01-03 | End: 2022-01-04 | Stop reason: HOSPADM

## 2022-01-03 RX ORDER — CLOPIDOGREL BISULFATE 75 MG/1
TABLET ORAL
Status: DISCONTINUED | OUTPATIENT
Start: 2022-01-03 | End: 2022-01-03 | Stop reason: HOSPADM

## 2022-01-03 RX ORDER — ONDANSETRON 2 MG/ML
4 INJECTION INTRAMUSCULAR; INTRAVENOUS EVERY 6 HOURS PRN
Status: CANCELLED | OUTPATIENT
Start: 2022-01-03

## 2022-01-03 RX ORDER — FENTANYL CITRATE 50 UG/ML
25 INJECTION, SOLUTION INTRAMUSCULAR; INTRAVENOUS
Status: DISCONTINUED | OUTPATIENT
Start: 2022-01-03 | End: 2022-01-04 | Stop reason: HOSPADM

## 2022-01-03 RX ORDER — NITROGLYCERIN 0.4 MG/1
0.4 TABLET SUBLINGUAL EVERY 5 MIN PRN
Status: DISCONTINUED | OUTPATIENT
Start: 2022-01-03 | End: 2022-01-04 | Stop reason: HOSPADM

## 2022-01-03 RX ORDER — DILTIAZEM HYDROCHLORIDE 120 MG/1
120 CAPSULE, COATED, EXTENDED RELEASE ORAL DAILY
Status: CANCELLED | OUTPATIENT
Start: 2022-01-03

## 2022-01-03 RX ORDER — ASPIRIN 325 MG
325 TABLET ORAL ONCE
Status: COMPLETED | OUTPATIENT
Start: 2022-01-03 | End: 2022-01-03

## 2022-01-03 RX ORDER — DEXTROSE MONOHYDRATE 25 G/50ML
25-50 INJECTION, SOLUTION INTRAVENOUS
Status: DISCONTINUED | OUTPATIENT
Start: 2022-01-03 | End: 2022-01-03

## 2022-01-03 RX ORDER — ATORVASTATIN CALCIUM 40 MG/1
80 TABLET, FILM COATED ORAL DAILY
Status: DISCONTINUED | OUTPATIENT
Start: 2022-01-03 | End: 2022-01-04 | Stop reason: HOSPADM

## 2022-01-03 RX ORDER — IODIXANOL 320 MG/ML
INJECTION, SOLUTION INTRAVASCULAR
Status: DISCONTINUED | OUTPATIENT
Start: 2022-01-03 | End: 2022-01-03 | Stop reason: HOSPADM

## 2022-01-03 RX ORDER — NICOTINE POLACRILEX 4 MG
15-30 LOZENGE BUCCAL
Status: DISCONTINUED | OUTPATIENT
Start: 2022-01-03 | End: 2022-01-03

## 2022-01-03 RX ORDER — ASPIRIN 81 MG/1
81 TABLET, CHEWABLE ORAL ONCE
Status: DISCONTINUED | OUTPATIENT
Start: 2022-01-03 | End: 2022-01-03

## 2022-01-03 RX ORDER — CLOPIDOGREL BISULFATE 75 MG/1
75 TABLET ORAL DAILY
Status: DISCONTINUED | OUTPATIENT
Start: 2022-01-04 | End: 2022-01-03

## 2022-01-03 RX ORDER — ACETAMINOPHEN 650 MG/1
650 SUPPOSITORY RECTAL EVERY 6 HOURS PRN
Status: DISCONTINUED | OUTPATIENT
Start: 2022-01-03 | End: 2022-01-04 | Stop reason: HOSPADM

## 2022-01-03 RX ORDER — CLOPIDOGREL BISULFATE 75 MG/1
75 TABLET ORAL DAILY
Qty: 90 TABLET | Refills: 3 | Status: SHIPPED | OUTPATIENT
Start: 2022-01-04 | End: 2023-04-19

## 2022-01-03 RX ORDER — HEPARIN SODIUM 10000 [USP'U]/100ML
0-5000 INJECTION, SOLUTION INTRAVENOUS CONTINUOUS
Status: DISCONTINUED | OUTPATIENT
Start: 2022-01-03 | End: 2022-01-03

## 2022-01-03 RX ORDER — OXYCODONE HYDROCHLORIDE 5 MG/1
10 TABLET ORAL EVERY 4 HOURS PRN
Status: DISCONTINUED | OUTPATIENT
Start: 2022-01-03 | End: 2022-01-04 | Stop reason: HOSPADM

## 2022-01-03 RX ORDER — FENTANYL CITRATE 50 UG/ML
INJECTION, SOLUTION INTRAMUSCULAR; INTRAVENOUS
Status: DISCONTINUED | OUTPATIENT
Start: 2022-01-03 | End: 2022-01-03 | Stop reason: HOSPADM

## 2022-01-03 RX ORDER — DOCUSATE SODIUM 100 MG/1
100 CAPSULE, LIQUID FILLED ORAL 2 TIMES DAILY
Status: DISCONTINUED | OUTPATIENT
Start: 2022-01-03 | End: 2022-01-04 | Stop reason: HOSPADM

## 2022-01-03 RX ORDER — CLOPIDOGREL BISULFATE 75 MG/1
75 TABLET ORAL DAILY
Status: CANCELLED | OUTPATIENT
Start: 2022-01-03

## 2022-01-03 RX ORDER — NALOXONE HYDROCHLORIDE 0.4 MG/ML
0.4 INJECTION, SOLUTION INTRAMUSCULAR; INTRAVENOUS; SUBCUTANEOUS
Status: ACTIVE | OUTPATIENT
Start: 2022-01-03 | End: 2022-01-03

## 2022-01-03 RX ORDER — ONDANSETRON 4 MG/1
4 TABLET, ORALLY DISINTEGRATING ORAL EVERY 6 HOURS PRN
Status: DISCONTINUED | OUTPATIENT
Start: 2022-01-03 | End: 2022-01-04 | Stop reason: HOSPADM

## 2022-01-03 RX ORDER — NICOTINE POLACRILEX 4 MG
15-30 LOZENGE BUCCAL
Status: CANCELLED | OUTPATIENT
Start: 2022-01-03

## 2022-01-03 RX ORDER — ACETAMINOPHEN 650 MG/1
650 SUPPOSITORY RECTAL EVERY 6 HOURS PRN
Status: CANCELLED | OUTPATIENT
Start: 2022-01-03

## 2022-01-03 RX ORDER — ASPIRIN 81 MG/1
81 TABLET, CHEWABLE ORAL DAILY
Qty: 30 TABLET | Refills: 3 | Status: SHIPPED | OUTPATIENT
Start: 2022-01-03 | End: 2022-12-18

## 2022-01-03 RX ORDER — ATORVASTATIN CALCIUM 80 MG/1
80 TABLET, FILM COATED ORAL DAILY
Qty: 90 TABLET | Refills: 3 | Status: SHIPPED | OUTPATIENT
Start: 2022-01-03

## 2022-01-03 RX ORDER — LIDOCAINE 40 MG/G
CREAM TOPICAL
Status: DISCONTINUED | OUTPATIENT
Start: 2022-01-03 | End: 2022-01-03 | Stop reason: HOSPADM

## 2022-01-03 RX ORDER — HEPARIN SODIUM 10000 [USP'U]/100ML
0-5000 INJECTION, SOLUTION INTRAVENOUS CONTINUOUS
Status: CANCELLED | OUTPATIENT
Start: 2022-01-03

## 2022-01-03 RX ORDER — OXYCODONE HYDROCHLORIDE 5 MG/1
5 TABLET ORAL EVERY 4 HOURS PRN
Status: DISCONTINUED | OUTPATIENT
Start: 2022-01-03 | End: 2022-01-04 | Stop reason: HOSPADM

## 2022-01-03 RX ORDER — METOPROLOL TARTRATE 1 MG/ML
5 INJECTION, SOLUTION INTRAVENOUS
Status: DISCONTINUED | OUTPATIENT
Start: 2022-01-03 | End: 2022-01-04 | Stop reason: HOSPADM

## 2022-01-03 RX ORDER — ATROPINE SULFATE 0.1 MG/ML
0.5 INJECTION INTRAVENOUS
Status: ACTIVE | OUTPATIENT
Start: 2022-01-03 | End: 2022-01-03

## 2022-01-03 RX ORDER — ACETAMINOPHEN 325 MG/1
650 TABLET ORAL EVERY 6 HOURS PRN
Status: CANCELLED | OUTPATIENT
Start: 2022-01-03

## 2022-01-03 RX ORDER — SODIUM CHLORIDE 9 MG/ML
INJECTION, SOLUTION INTRAVENOUS CONTINUOUS
Status: DISCONTINUED | OUTPATIENT
Start: 2022-01-03 | End: 2022-01-03 | Stop reason: HOSPADM

## 2022-01-03 RX ORDER — ASPIRIN 81 MG/1
243 TABLET, CHEWABLE ORAL ONCE
Status: COMPLETED | OUTPATIENT
Start: 2022-01-03 | End: 2022-01-03

## 2022-01-03 RX ORDER — FAMOTIDINE 10 MG
10 TABLET ORAL 2 TIMES DAILY
Status: DISCONTINUED | OUTPATIENT
Start: 2022-01-03 | End: 2022-01-04 | Stop reason: HOSPADM

## 2022-01-03 RX ORDER — FAMOTIDINE 10 MG
10 TABLET ORAL 2 TIMES DAILY
Status: CANCELLED | OUTPATIENT
Start: 2022-01-03

## 2022-01-03 RX ADMIN — LOSARTAN POTASSIUM 100 MG: 50 TABLET, FILM COATED ORAL at 08:05

## 2022-01-03 RX ADMIN — FAMOTIDINE 10 MG: 10 TABLET ORAL at 20:12

## 2022-01-03 RX ADMIN — DOCUSATE SODIUM 100 MG: 100 CAPSULE, LIQUID FILLED ORAL at 20:11

## 2022-01-03 RX ADMIN — FAMOTIDINE 10 MG: 10 TABLET ORAL at 16:18

## 2022-01-03 RX ADMIN — CLOPIDOGREL BISULFATE 75 MG: 75 TABLET ORAL at 08:05

## 2022-01-03 RX ADMIN — METOPROLOL SUCCINATE 100 MG: 100 TABLET, EXTENDED RELEASE ORAL at 16:18

## 2022-01-03 RX ADMIN — HYDRALAZINE HYDROCHLORIDE 10 MG: 20 INJECTION INTRAMUSCULAR; INTRAVENOUS at 21:08

## 2022-01-03 RX ADMIN — ATORVASTATIN CALCIUM 80 MG: 40 TABLET, FILM COATED ORAL at 16:18

## 2022-01-03 RX ADMIN — ASPIRIN 325 MG: 325 TABLET ORAL at 09:51

## 2022-01-03 RX ADMIN — DILTIAZEM HYDROCHLORIDE 120 MG: 120 CAPSULE, COATED, EXTENDED RELEASE ORAL at 16:18

## 2022-01-03 RX ADMIN — DIAZEPAM 5 MG: 5 TABLET ORAL at 09:51

## 2022-01-03 RX ADMIN — HEPARIN SODIUM 1450 UNITS/HR: 10000 INJECTION, SOLUTION INTRAVENOUS at 02:26

## 2022-01-03 RX ADMIN — HYDROCHLOROTHIAZIDE 12.5 MG: 12.5 CAPSULE ORAL at 16:18

## 2022-01-03 RX ADMIN — PERFLUTREN 2.5 ML: 6.52 INJECTION, SUSPENSION INTRAVENOUS at 13:15

## 2022-01-03 RX ADMIN — TAMSULOSIN HYDROCHLORIDE 0.4 MG: 0.4 CAPSULE ORAL at 20:11

## 2022-01-03 ASSESSMENT — ACTIVITIES OF DAILY LIVING (ADL)
ADLS_ACUITY_SCORE: 12
ADLS_ACUITY_SCORE: 12
ADLS_ACUITY_SCORE: 4
ADLS_ACUITY_SCORE: 12
ADLS_ACUITY_SCORE: 4
ADLS_ACUITY_SCORE: 12
ADLS_ACUITY_SCORE: 12
ADLS_ACUITY_SCORE: 4
ADLS_ACUITY_SCORE: 4
ADLS_ACUITY_SCORE: 12
ADLS_ACUITY_SCORE: 4
ADLS_ACUITY_SCORE: 4
ADLS_ACUITY_SCORE: 12
ADLS_ACUITY_SCORE: 4
ADLS_ACUITY_SCORE: 12
ADLS_ACUITY_SCORE: 4

## 2022-01-03 ASSESSMENT — MIFFLIN-ST. JEOR
SCORE: 1799.02
SCORE: 1702.85
SCORE: 1794.48

## 2022-01-03 NOTE — Clinical Note
The first balloon was inserted into the right coronary artery and distal right coronary artery.Max pressure = 14 justo. Total duration = 22 seconds.

## 2022-01-03 NOTE — PLAN OF CARE
Transfer from [facility].  Recent chest pain.  History of stent.  Wife present.  No questions.  Plan on Telemetry bed when available.

## 2022-01-03 NOTE — Clinical Note
The first balloon was inserted into the left anterior descending and middle left anterior descending.Max pressure = 20 justo. Total duration = 24 seconds.

## 2022-01-03 NOTE — PROGRESS NOTES
Care Management Discharge Note    Discharge Date: 01/03/2022       Discharge Disposition: [facility] for angiogram    Discharge Services: None    Discharge DME: None    Discharge Transportation: family or friend will provide (wife to transport)    Private pay costs discussed: Not applicable    PAS Confirmation Code:  Not applicable  Patient/family educated on Medicare website which has current facility and service quality ratings:  Not applicable     Education Provided on the Discharge Plan:  Per treatment team   Persons Notified of Discharge Plans: patient   Patient/Family in Agreement with the Plan: yes    Handoff Referral Completed: not applicable at this time     Additional Information:    Pt scheduled to go to [facility] for angiogram today 1/3.  No CM needs identified at this time.    JOSE LUIS Mckeon

## 2022-01-03 NOTE — PLAN OF CARE
Problem: Adult Inpatient Plan of Care  Goal: Absence of Hospital-Acquired Illness or Injury  Intervention: Prevent Skin Injury     Pt is still on heparin drip. Dose adjusted to 1450 units /hr. Pt scheduled for angiogram at [facility] at 10 am this morning. Last Anti XA at 0130 was 0.15,bolus given and rate adjusted. Initial Troponin level was 0.37.

## 2022-01-03 NOTE — Clinical Note
The first balloon was inserted into the right coronary artery and distal right coronary artery.Max pressure = 4 justo. Total duration = 10 seconds.     Max pressure = 4 justo. Total duration = 10 seconds.    Balloon reinflated a second time: Max pressure = 4 justo. Total duration = 10 seconds.  Balloon reinflated a third time: Max pressure = 4 justo. Total duration = 10 seconds.  Balloon reinflated a fourth time: Max pressure = 4 justo. Total duration = 10 seconds.

## 2022-01-03 NOTE — Clinical Note
Stent deployed in the distal right coronary artery. Max pressure = 16 justo. Total duration = 30 seconds.

## 2022-01-03 NOTE — Clinical Note
Stent deployed in the middle left anterior descending. Max pressure = 14 justo. Total duration = 30 seconds.

## 2022-01-03 NOTE — DISCHARGE INSTRUCTIONS

## 2022-01-03 NOTE — PROGRESS NOTES
Transfer via wheelchair with monitor.  Report called to Rachana.  Appointments scheduled.  No questions.  Wife transferred with patient and belongings.

## 2022-01-03 NOTE — Clinical Note
The first balloon was inserted into the right coronary artery and distal right coronary artery.Max pressure = 4 justo. Total duration = 10 seconds.     Max pressure = 4 justo. Total duration = 10 seconds.   shockwave.  Balloon reinflated a second time: Max pressure = 4 justo. Total duration = 10 seconds.  Balloon reinflated a third time: Max pressure = 4 justo. Total duration = 10 seconds.  Balloon reinflated a fourth time: Max pressure = 4 justo. Total duration = 10 seconds.  Balloon reinflated a fourth time: Max pressure = 4 justo. Total duration = 10 seconds.

## 2022-01-03 NOTE — PROGRESS NOTES
[facility]    PROGRESS NOTE - Hospitalist Service    Assessment and Plan      Koko Ronquillo is a 76 year old old male with a history of known coronary artery disease admitted to the hospital with NSTEMI.  Transferred from [facility] to [facility] for coronary angiography      NSTEMI  --- Status post angiogram today with stenting to LAD and RCA  --- Continue postprocedural care per cardiology  --- Troponins had normalized.  Had been on heparin drip for 48 hours  --- Patient previously pravastatin.  LDL 57.  Patient now placed on atorvastatin  --- Continuing aspirin and Plavix    Coronary artery disease  --- See management per above  --- Continuing Toprol, Cozaar, now on atorvastatin    Diabetes mellitus, type II  --- Previously held home Metformin  ---- Insulin sliding scale    BPH  ---Home Flomax    Hypertension--- continue home medications including diltiazem, hydrochlorothiazide, Cozaar, Toprol    COVID STATUS; -1/1  VTE prophylaxis:  Off heparin drip now  DIET: Orders Placed This Encounter      Low Saturated Fat Na <2400 mg    Disposition; home  Barriers to discharge: ; stable monitoring post procedure  Code Status: Full Code        Subjective:  Patient seen in the presence of his wife after angiogram and stent placement.  Resting in bed.  Several questions about right wrist closure device  Denies chest pain or shortness of breath  Last angiogram 2019    PHYSICAL EXAM  B/P: 149/69, T: 97.9, P: 59, R: 18   No intake or output data in the 24 hours ending 01/03/22 1511   Vitals:    01/03/22 0900   Weight: 97.2 kg (214 lb 3.2 oz)       General Appearance: Pleasant, no apparent distress  Respiratory: Clear to auscultation bilaterally  Cardiovascular: Regular rate and without significant murmurs  GI: Soft and nontender without hepatosplenomegaly  Skin: No significant lower extremity edema or rashes  Neurologic: Neurologically grossly intact without focal deficits appreciated      PERTINENT  LABS/IMAGING:    Recent Results (from the past 24 hour(s))   Heparin Unfractionated Anti Xa Level    Collection Time: 01/02/22  6:09 PM   Result Value Ref Range    Anti Xa Unfractionated Heparin 0.25 For Reference Range, See Comment IU/mL   Extra Red Top Tube    Collection Time: 01/02/22  6:09 PM   Result Value Ref Range    Hold Specimen JIC    Lipid Profile    Collection Time: 01/02/22  6:09 PM   Result Value Ref Range    Cholesterol 132 <=199 mg/dL    Triglycerides 241 (H) <=149 mg/dL    Direct Measure HDL 27 (L) >=40 mg/dL    LDL Cholesterol Calculated 57 <=129 mg/dL   Heparin Unfractionated Anti Xa Level    Collection Time: 01/03/22  1:31 AM   Result Value Ref Range    Anti Xa Unfractionated Heparin 0.15 For Reference Range, See Comment IU/mL   Heparin Unfractionated Anti Xa Level    Collection Time: 01/03/22  6:17 AM   Result Value Ref Range    Anti Xa Unfractionated Heparin 0.60 For Reference Range, See Comment IU/mL   INR    Collection Time: 01/03/22  6:17 AM   Result Value Ref Range    INR 0.95 0.85 - 1.15   Extra Red Top Tube    Collection Time: 01/03/22  6:17 AM   Result Value Ref Range    Hold Specimen JIC    Activated clotting time celite, POCT    Collection Time: 01/03/22 10:52 AM   Result Value Ref Range    Activated Clotting Time (Celite) POCT 301 (H) 74 - 150 seconds   Activated clotting time celite, POCT    Collection Time: 01/03/22 11:17 AM   Result Value Ref Range    Activated Clotting Time (Celite) POCT 284 (H) 74 - 150 seconds   Activated clotting time celite, POCT    Collection Time: 01/03/22 11:38 AM   Result Value Ref Range    Activated Clotting Time (Celite) POCT 229 (H) 74 - 150 seconds   ECG 12-lead, with Muse    Collection Time: 01/03/22 12:36 PM   Result Value Ref Range    Systolic Blood Pressure  mmHg    Diastolic Blood Pressure  mmHg    Ventricular Rate 53 BPM    Atrial Rate 53 BPM    TX Interval 190 ms    QRS Duration 84 ms     ms    QTc 416 ms    P Axis 30 degrees    R AXIS -1  degrees    T Axis 7 degrees    Interpretation ECG       Sinus bradycardia  Otherwise normal ECG  When compared with ECG of 01-JAN-2022 02:03,  Vent. rate has decreased BY  37 BPM  Criteria for Septal infarct are no longer Present     Echocardiogram Complete    Collection Time: 01/03/22  1:25 PM   Result Value Ref Range    LVEF  55-60%         Echocardiogram Complete   Final Result      Cardiac Catheterization   Final Result           Vannessa Hankins MD  [facility] Medicine Service  [phone]

## 2022-01-03 NOTE — Clinical Note
The first balloon was inserted into the left anterior descending and middle left anterior descending.Max pressure = 6 justo. Total duration = 20 seconds.     Max pressure = 6 justo. Total duration = 20 seconds.    Balloon reinflated a second time: Max pressure = 6 jusot. Total duration = 20 seconds.

## 2022-01-03 NOTE — PRE-PROCEDURE
GENERAL PRE-PROCEDURE:   Procedure:  Coronary angiogram with possible PCI  Date/Time:  1/3/2022 9:38 AM    Written consent obtained?: Yes    Risks and benefits: Risks, benefits and alternatives were discussed    Consent given by:  Patient  Patient states understanding of procedure being performed: Yes    Patient's understanding of procedure matches consent: Yes    Procedure consent matches procedure scheduled: Yes    Expected level of sedation:  Moderate  Appropriately NPO:  Yes  ASA Class:  3 (CAD; s/p PCI, AF, HTN, HLD, SHAWN, DM Type II, Class I obesity; BMI 30.73kg/m2)  Mallampati  :  Grade 1- soft palate, uvula, tonsillar pillars, and posterior pharyngeal wall visible  Lungs:  Lungs clear with good breath sounds bilaterally  Heart:  Normal heart sounds and rate  History & Physical reviewed:  History and physical reviewed and no updates needed  Statement of review:  I have reviewed the lab findings, diagnostic data, medications, and the plan for sedation

## 2022-01-03 NOTE — DISCHARGE SUMMARY
[facility] MEDICINE  DISCHARGE SUMMARY     [facility] Care Physician[facility]  Admission Date: 1/1/2022   Discharge Provider: Benji Monet MD, MD Discharge Date: 1/3/2022   Diet:   Active Diet and Nourishment Order   Procedures     NPO per Anesthesia Guidelines for Procedure/Surgery Except for: Meds       Code Status: Full Code   Activity: Bedrest           REASON FOR PRESENTATION(See Admission Note for Details)   Chest pain    PRINCIPAL & ACTIVE DISCHARGE DIAGNOSES     Principal Problem:    Chest pain  Active Problems:    Chest pain, unspecified type      PENDING LABS     Unresulted Labs Ordered in the Past 30 Days of this Admission     No orders found from 12/2/2021 to 1/2/2022.            PROCEDURES ( this hospitalization only)          RECOMMENDATIONS TO OUTPATIENT PROVIDER FOR F/U VISIT     Not applicable    DISPOSITION   Transfer to [facility]      SUMMARY OF HOSPITAL COURSE:      Very pleasant 76-year-old gentleman presented with atypical chest pain on 1/1/2022.  He has a extensive past history of coronary disease with stent placement.  Patient was admitted for evaluation and had normal EKG changes.  Troponins had mild elevation which quickly came down after heparin was initiated.  He was evaluated by cardiology, heparin drip started, and it was recommended that he have an angiogram.  Patient will be transferred to be [facility] to [facility] for further cardiac study.    Discharge Medications with Med changes:     Current Discharge Medication List      CONTINUE these medications which have NOT CHANGED    Details   albuterol (PROAIR HFA;PROVENTIL HFA;VENTOLIN HFA) 90 mcg/actuation inhaler [ALBUTEROL (PROAIR HFA;PROVENTIL HFA;VENTOLIN HFA) 90 MCG/ACTUATION INHALER] Inhale 1 puff daily as needed.    Comments: May substitute the equivalent medication per insurance preference.      clopidogrel (PLAVIX) 75 MG tablet TAKE 1 TABLET(75  MG) BY MOUTH DAILY  Qty: 90 tablet, Refills: 1    Associated Diagnoses: CAD (coronary artery disease)      diltiazem ER COATED BEADS (CARDIZEM CD/CARTIA XT) 120 MG 24 hr capsule Take 1 capsule (120 mg) by mouth daily  Qty: 90 capsule, Refills: 1    Associated Diagnoses: Essential hypertension; Essential hypertension with goal blood pressure less than 140/90; New onset atrial fibrillation (H)      folic acid (FOLVITE) 1 MG tablet Take 2 mg by mouth daily Do not take on methotrexate days (Tuesday)  Refills: 4      hydrochlorothiazide (HYDRODIURIL) 12.5 MG tablet Take 1 tablet (12.5 mg) by mouth daily  Qty: 90 tablet, Refills: 1    Associated Diagnoses: Essential hypertension      losartan (COZAAR) 100 MG tablet Take 1 tablet (100 mg) by mouth daily  Qty: 90 tablet, Refills: 1    Associated Diagnoses: Essential hypertension; Abdominal aortic aneurysm (AAA) without rupture (H)      !! metFORMIN (GLUCOPHAGE) 1000 MG tablet Take 1,000 mg by mouth daily (with dinner)      !! metFORMIN (GLUCOPHAGE) 500 MG tablet [METFORMIN (GLUCOPHAGE) 500 MG TABLET] TAKE 1 TABLET BY MOUTH EVERY MORNING AND 2 TABLETS AT NIGHT.  Qty: 270 tablet, Refills: 3    Associated Diagnoses: Type 2 diabetes mellitus treated without insulin (H)      methotrexate 2.5 MG tablet Take 12.5 mg by mouth once a week Tuesdays      metoprolol succinate (TOPROL-XL) 100 MG 24 hr tablet [METOPROLOL SUCCINATE (TOPROL-XL) 100 MG 24 HR TABLET] TAKE 1 TABLET(100 MG) BY MOUTH DAILY  Qty: 90 tablet, Refills: 2    Associated Diagnoses: Essential hypertension      nitroglycerin (NITROSTAT) 0.4 MG SL tablet [NITROGLYCERIN (NITROSTAT) 0.4 MG SL TABLET] Place 1 tablet (0.4 mg total) under the tongue every 5 (five) minutes as needed for chest pain.  Qty: 20 tablet, Refills: 1    Associated Diagnoses: Chest pain made worse by breathing      pravastatin (PRAVACHOL) 80 MG tablet [PRAVASTATIN (PRAVACHOL) 80 MG TABLET] Take 1 tablet (80 mg total) by mouth at bedtime.  Qty: 90  tablet, Refills: 2    Associated Diagnoses: Pure hypercholesterolemia      tamsulosin (FLOMAX) 0.4 mg cap [TAMSULOSIN (FLOMAX) 0.4 MG CAP] Take 1 capsule (0.4 mg total) by mouth 2 (two) times a day.  Qty: 180 capsule, Refills: 0    Associated Diagnoses: BPH (benign prostatic hyperplasia)      blood glucose test strips [BLOOD GLUCOSE TEST STRIPS] Use 1 each As Directed 2 (two) times a day. Dispense brand per patient's insurance at pharmacy discretion.  Qty: 180 strip, Refills: 1    Associated Diagnoses: Type 2 diabetes mellitus treated without insulin (H)      generic lancets [GENERIC LANCETS] Use 1 each As Directed 4 (four) times a day.  Qty: 100 each, Refills: 6    Comments: Pt prefers fastclix rather than softclix  Associated Diagnoses: Diabetes mellitus, type 2 (H)       !! - Potential duplicate medications found. Please discuss with provider.                  Consults     CARDIOLOGY IP CONSULT  CARDIOLOGY IP CONSULT  SOCIAL WORK IP CONSULT  PHARMACY IP CONSULT  PHARMACY IP CONSULT  ADVANCE DIRECTIVE IP CONSULT  PHARMACY IP CONSULT  PHARMACY IP CONSULT  ADVANCE DIRECTIVE IP CONSULT    Immunizations given this encounter     Most Recent Immunizations   Administered Date(s) Administered     Flu, Unspecified 10/15/2008     Influenza (High Dose) 3 valent vaccine 11/28/2018     Influenza Vaccine, 6+MO IM (QUADRIVALENT W/PRESERVATIVES) 11/11/2014     Pneumo Conj 13-V (2010&after) 10/11/2016     Pneumococcal 23 valent 09/14/2011     Td,adult,historic,unspecified 1944     Tdap (Adacel,Boostrix) 02/18/2011           Anticoagulation Information    Heparin      SIGNIFICANT IMAGING FINDINGS     Results for orders placed or performed during the hospital encounter of 01/01/22   Chest XR,  PA & LAT    Impression    IMPRESSION: Stable cardiomediastinal silhouette. Atherosclerotic, ectatic aorta. Partial visualization of aortic stent graft in the lower chest and upper abdomen. Basilar interstitial prominence. Mild  reticulonodular opacity right upper lung. Infectious   or inflammatory etiology not excluded. Follow-up to confirm resolution recommended to exclude pulmonary nodules.       SIGNIFICANT LABORATORY FINDINGS   Troponin initially 0.01 and elevated to a maximum of 0.37 before coming down    Discharge Orders     No discharge procedures on file.    Examination   Alert conversant no acute distress  Skin Pink and dry  Heart regular rhythm normal S1 normal S2  Lungs clear to auscultation      Please see EMR for more detailed significant labs, imaging, consultant notes etc.    > 35 minutes spent in discharge process.    Benji Monet MD, MD  [facility]    CC:Clinic, [facility]

## 2022-01-03 NOTE — PHARMACY-ADMISSION MEDICATION HISTORY
Pharmacy Note - Admission Medication History    Admission medication history completed at [facility].  Please see Pharmacy - Admission Medication History note from 1/1/2022.

## 2022-01-03 NOTE — Clinical Note
The first balloon was inserted into the right coronary artery and distal right coronary artery.Max pressure = 14 justo. Total duration = 20 seconds.     Max pressure = 14 justo. Total duration = 20 seconds.    Balloon reinflated a second time: Max pressure = 14 justo. Total duration = 20 seconds.  Balloon reinflated a third time: Max pressure = 16 justo. Total duration = 24 seconds.  Balloon reinflated a fourth time: Max pressure = 14 justo. Total duration = 20 seconds.

## 2022-01-04 ENCOUNTER — APPOINTMENT (OUTPATIENT)
Dept: OCCUPATIONAL THERAPY | Facility: HOSPITAL | Age: 77
DRG: 247 | End: 2022-01-04
Attending: INTERNAL MEDICINE
Payer: COMMERCIAL

## 2022-01-04 VITALS
RESPIRATION RATE: 18 BRPM | DIASTOLIC BLOOD PRESSURE: 71 MMHG | OXYGEN SATURATION: 95 % | HEART RATE: 62 BPM | SYSTOLIC BLOOD PRESSURE: 149 MMHG | HEIGHT: 70 IN | WEIGHT: 230.8 LBS | BODY MASS INDEX: 33.04 KG/M2 | TEMPERATURE: 97.2 F

## 2022-01-04 LAB
ANION GAP SERPL CALCULATED.3IONS-SCNC: 12 MMOL/L (ref 5–18)
ATRIAL RATE - MUSE: 64 BPM
BUN SERPL-MCNC: 15 MG/DL (ref 8–28)
CALCIUM SERPL-MCNC: 10 MG/DL (ref 8.5–10.5)
CHLORIDE BLD-SCNC: 103 MMOL/L (ref 98–107)
CO2 SERPL-SCNC: 23 MMOL/L (ref 22–31)
CREAT SERPL-MCNC: 0.96 MG/DL (ref 0.7–1.3)
DIASTOLIC BLOOD PRESSURE - MUSE: NORMAL MMHG
ERYTHROCYTE [DISTWIDTH] IN BLOOD BY AUTOMATED COUNT: 13.6 % (ref 10–15)
GFR SERPL CREATININE-BSD FRML MDRD: 81 ML/MIN/1.73M2
GLUCOSE BLD-MCNC: 119 MG/DL (ref 70–125)
HCT VFR BLD AUTO: 37 % (ref 40–53)
HGB BLD-MCNC: 12.8 G/DL (ref 13.3–17.7)
INTERPRETATION ECG - MUSE: NORMAL
MCH RBC QN AUTO: 32.1 PG (ref 26.5–33)
MCHC RBC AUTO-ENTMCNC: 34.6 G/DL (ref 31.5–36.5)
MCV RBC AUTO: 93 FL (ref 78–100)
P AXIS - MUSE: 41 DEGREES
PLATELET # BLD AUTO: 216 10E3/UL (ref 150–450)
POTASSIUM BLD-SCNC: 3.7 MMOL/L (ref 3.5–5)
PR INTERVAL - MUSE: 188 MS
QRS DURATION - MUSE: 80 MS
QT - MUSE: 414 MS
QTC - MUSE: 427 MS
R AXIS - MUSE: 11 DEGREES
RBC # BLD AUTO: 3.99 10E6/UL (ref 4.4–5.9)
SODIUM SERPL-SCNC: 138 MMOL/L (ref 136–145)
SYSTOLIC BLOOD PRESSURE - MUSE: NORMAL MMHG
T AXIS - MUSE: 16 DEGREES
TROPONIN I SERPL-MCNC: 0.53 NG/ML (ref 0–0.29)
VENTRICULAR RATE- MUSE: 64 BPM
WBC # BLD AUTO: 7 10E3/UL (ref 4–11)

## 2022-01-04 PROCEDURE — 99239 HOSP IP/OBS DSCHRG MGMT >30: CPT | Performed by: FAMILY MEDICINE

## 2022-01-04 PROCEDURE — 93010 ELECTROCARDIOGRAM REPORT: CPT | Performed by: INTERNAL MEDICINE

## 2022-01-04 PROCEDURE — 250N000013 HC RX MED GY IP 250 OP 250 PS 637: Performed by: FAMILY MEDICINE

## 2022-01-04 PROCEDURE — 84484 ASSAY OF TROPONIN QUANT: CPT | Performed by: INTERNAL MEDICINE

## 2022-01-04 PROCEDURE — 93005 ELECTROCARDIOGRAM TRACING: CPT

## 2022-01-04 PROCEDURE — 97535 SELF CARE MNGMENT TRAINING: CPT | Mod: GO

## 2022-01-04 PROCEDURE — 99232 SBSQ HOSP IP/OBS MODERATE 35: CPT | Performed by: INTERNAL MEDICINE

## 2022-01-04 PROCEDURE — 85027 COMPLETE CBC AUTOMATED: CPT | Performed by: FAMILY MEDICINE

## 2022-01-04 PROCEDURE — 82310 ASSAY OF CALCIUM: CPT | Performed by: INTERNAL MEDICINE

## 2022-01-04 PROCEDURE — 97530 THERAPEUTIC ACTIVITIES: CPT | Mod: GO

## 2022-01-04 PROCEDURE — 97166 OT EVAL MOD COMPLEX 45 MIN: CPT | Mod: GO

## 2022-01-04 PROCEDURE — 250N000012 HC RX MED GY IP 250 OP 636 PS 637: Performed by: FAMILY MEDICINE

## 2022-01-04 PROCEDURE — 36415 COLL VENOUS BLD VENIPUNCTURE: CPT | Performed by: INTERNAL MEDICINE

## 2022-01-04 PROCEDURE — 250N000013 HC RX MED GY IP 250 OP 250 PS 637: Performed by: INTERNAL MEDICINE

## 2022-01-04 RX ADMIN — METOPROLOL SUCCINATE 100 MG: 100 TABLET, EXTENDED RELEASE ORAL at 08:54

## 2022-01-04 RX ADMIN — ASPIRIN 81 MG: 81 TABLET, COATED ORAL at 08:54

## 2022-01-04 RX ADMIN — METFORMIN HYDROCHLORIDE 500 MG: 500 TABLET ORAL at 08:54

## 2022-01-04 RX ADMIN — FAMOTIDINE 10 MG: 10 TABLET ORAL at 08:54

## 2022-01-04 RX ADMIN — TAMSULOSIN HYDROCHLORIDE 0.4 MG: 0.4 CAPSULE ORAL at 08:54

## 2022-01-04 RX ADMIN — METHOTREXATE SODIUM 12.5 MG: 2.5 TABLET ORAL at 08:54

## 2022-01-04 RX ADMIN — CLOPIDOGREL BISULFATE 75 MG: 75 TABLET ORAL at 08:54

## 2022-01-04 RX ADMIN — DOCUSATE SODIUM 100 MG: 100 CAPSULE, LIQUID FILLED ORAL at 08:53

## 2022-01-04 RX ADMIN — ATORVASTATIN CALCIUM 80 MG: 40 TABLET, FILM COATED ORAL at 08:53

## 2022-01-04 RX ADMIN — HYDROCHLOROTHIAZIDE 12.5 MG: 12.5 CAPSULE ORAL at 08:53

## 2022-01-04 RX ADMIN — DILTIAZEM HYDROCHLORIDE 120 MG: 120 CAPSULE, COATED, EXTENDED RELEASE ORAL at 08:53

## 2022-01-04 RX ADMIN — LOSARTAN POTASSIUM 100 MG: 50 TABLET, FILM COATED ORAL at 08:54

## 2022-01-04 ASSESSMENT — ACTIVITIES OF DAILY LIVING (ADL)
ADLS_ACUITY_SCORE: 4
ADLS_ACUITY_SCORE: 8
ADLS_ACUITY_SCORE: 4
ADLS_ACUITY_SCORE: 8
ADLS_ACUITY_SCORE: 8
ADLS_ACUITY_SCORE: 4
ADLS_ACUITY_SCORE: 8
ADLS_ACUITY_SCORE: 8

## 2022-01-04 ASSESSMENT — MIFFLIN-ST. JEOR: SCORE: 1778.15

## 2022-01-04 NOTE — PLAN OF CARE
Problem: Adult Inpatient Plan of Care  Goal: Plan of Care Review  Outcome: Adequate for Discharge   Denies pain or shortness of breath.  NSR on tele.  Ambulating independently in room.  Plan to discharge to home.        Discharge education and instructions reviewed with patient and wife.  Questions answered.  Belongings sent with patient.  Discharging to home, transported by wife.

## 2022-01-04 NOTE — PLAN OF CARE
Problem: Bleeding (Cardiac Catheterization)  Goal: Absence of Bleeding  1/3/2022 1842 by Rachana Fong, RN  Outcome: Improving  1/3/2022 1839 by Rachana Fong, RN  Outcome: Improving     Pt with no bleeding or hematoma post angiogram, VSS, pt denies pain. Up independently to bathroom. Patient educated on post stent care. Pt hoping to discharge in the morning.

## 2022-01-04 NOTE — PROGRESS NOTES
Impression and Plan     Impression:   1. NSTEMI s/p PCI to LAD and RCA with Synergy drug-eluting stents. LVEF is preserved  2. Troponin elevation after coronary angiogram is not unusual. Without recurrent chest pain, likely has little additional clinical significance.  3. Hypertension  4. hyperlipidemia    Plan:    Okay for discharge from cardiac standpoint.    Follow up in post PCI clinic in 1-2 weeks and with Dr. Argueta in approximately 6 weeks.    Continue DAPT for minimum of 12 months    Referral placed to cardiac rehab    Primary Cardiologist: Dr. Ajay Argueta MD    Subjective     Feeling well. No further chest pain. Asking to go home.    Cardiac Diagnostics   Telemetry (personally reviewed): sinus rhythm    ECG (personally reviewed and interpreted): sinus rhythm with possible anterior infarct.    Echocardiogram (results reviewed):   1/3/22  Interpretation Summary     Technically challenging study.Definity contrast utlilized.  The left ventricle is normal in size.  There is mild concentric left ventricular hypertrophy.  Left ventricular systolic function is normal.  The visual ejection fraction is 55-60%.  Normal left ventricular wall motion  The right ventricle is not well visualized.  TAPSE is normal, which is consistent with normal right ventricular systolic function.  The left atrium is not well visualized.  Right atrium not well visualized.  Valve structures are not optimally visualized.No obvious significant valve abnormality observed  TDS-Definity used (no complications)  The aortic root measures mildly enlarged      Cardiac Cath (results reviewed): 1/4/22    NSTEMI in an obese diabetic with hypertension, hyperlipidemia, and CAD, s/p PCI of the RCA with rotational atherectomy in 2019 (images unavailable).    Diffuse, severe coronary calcification.    Minimal left main disease.    Mild proximal and mid LAD disease with a focal, severe mid LAD stenosis which was stented with a Synergy RANCHO with  "good angiographic results. There is a severe ostial lesion in a small mid diagonal. The distal LAD is diffusely small.    Large circumflex with mild proximal disease and moderate distal circumflex prior to the bifurcation of the large OM-3 and OM-4, both of which have moderate stenoses.    Large RCA is diffusely and severely calcified. The previous stents cover the proximal to distal RCA and there is a focal segment of severe restenosis in the distal RCA. PCI was difficult due to vessel angulation and calcification despite using an AL-1 guide and a Guideliner support catheter. See notes below. Successful PCI with a Synergy RANCHO with good angiographic results. Residual moderate distal RCA disease.    LV EDP elevated at 22 mmHg.            Physical Examination       BP (!) 155/66 (BP Location: Left arm)   Pulse 67   Temp 97.9  F (36.6  C) (Oral)   Resp 18   Ht 1.778 m (5' 10\")   Wt 104.7 kg (230 lb 12.8 oz)   SpO2 95%   BMI 33.12 kg/m        [unfilled]        Intake/Output Summary (Last 24 hours) at 1/4/2022 1151  Last data filed at 1/4/2022 0300  Gross per 24 hour   Intake 240 ml   Output 875 ml   Net -635 ml       General: pleasant male. No acute distress.   HENT: external ears normal. Nares patent. Mucous membranes moist.  Eyes: perrla, extraocular muscles intact. No scleral icterus.   Neck: No JVD  Lungs: clear to auscultation  COR: regular rate and rhythm, No murmurs, rubs, or gallops  Abd: nondistended, BS present  Extrem: No edema. R wrist access site stable. Normal R radial pulse.         Imaging      No new imaging.    Lab Results   Lab Results   Component Value Date    CHOL 132 01/02/2022    HDL 27 (L) 01/02/2022    TRIG 241 (H) 01/02/2022    BUN 15 01/04/2022     01/04/2022    CO2 23 01/04/2022       Lab Results   Component Value Date    WBC 7.0 01/04/2022    HGB 12.8 (L) 01/04/2022    HCT 37.0 (L) 01/04/2022    MCV 93 01/04/2022     01/04/2022       Lab Results   Component Value Date    " "TROPONINI 0.53 (HH) 01/04/2022    INR 0.95 01/03/2022               Current Inpatient Scheduled Medications   Scheduled Meds:    aspirin  81 mg Oral Daily     atorvastatin  80 mg Oral Daily     clopidogrel  75 mg Oral Daily     diltiazem ER COATED BEADS  120 mg Oral Daily     docusate sodium  100 mg Oral BID     famotidine  10 mg Oral BID     hydrochlorothiazide  12.5 mg Oral Daily     insulin aspart  1-3 Units Subcutaneous TID AC     insulin aspart  1-3 Units Subcutaneous At Bedtime     losartan  100 mg Oral Daily     metFORMIN  1,000 mg Oral Daily with supper     metFORMIN  500 mg Oral Daily with breakfast     methotrexate  12.5 mg Oral Weekly     metoprolol succinate ER  100 mg Oral Daily     tamsulosin  0.4 mg Oral BID     Continuous Infusions:    - MEDICATION INSTRUCTIONS -       Percutaneous Coronary Intervention orders placed (this is information for BPA alerting)              Medications Prior to Admission   Prior to Admission medications    Medication Sig Start Date End Date Taking? Authorizing Provider   aspirin (ASA) 81 MG chewable tablet Take 1 tablet (81 mg) by mouth daily Starting tomorrow. 1/3/22  Yes Hailee Pierce MD   atorvastatin (LIPITOR) 80 MG tablet Take 1 tablet (80 mg) by mouth daily 1/3/22  Yes Hailee Pierce MD   clopidogrel (PLAVIX) 75 MG tablet Take 1 tablet (75 mg) by mouth daily Dose to start tomorrow. 1/4/22  Yes Hailee Pierce MD   nitroGLYcerin (NITROSTAT) 0.4 MG sublingual tablet One tablet under the tongue every 5 minutes if needed for chest pain. May repeat every 5 minutes for a maximum of 3 doses in 15 minutes\" 1/3/22  Yes Hailee Pierce MD   albuterol (PROAIR HFA;PROVENTIL HFA;VENTOLIN HFA) 90 mcg/actuation inhaler [ALBUTEROL (PROAIR HFA;PROVENTIL HFA;VENTOLIN HFA) 90 MCG/ACTUATION INHALER] Inhale 1 puff daily as needed. 11/25/20   Provider, Historical   blood glucose test strips [BLOOD GLUCOSE TEST STRIPS] Use 1 each As Directed 2 (two) times a day. Dispense brand per " patient's insurance at pharmacy discretion. 2/28/19   Dillan Kim MD   clopidogrel (PLAVIX) 75 MG tablet TAKE 1 TABLET(75 MG) BY MOUTH DAILY 12/20/21   Ajay Argueta MD   diltiazem ER COATED BEADS (CARDIZEM CD/CARTIA XT) 120 MG 24 hr capsule Take 1 capsule (120 mg) by mouth daily 9/13/21   Ajay Argueta MD   folic acid (FOLVITE) 1 MG tablet Take 2 mg by mouth daily Do not take on methotrexate days (Tuesday) 1/30/17   Provider, Historical   generic lancets [GENERIC LANCETS] Use 1 each As Directed 4 (four) times a day. 3/6/19   Dillan Kim MD   hydrochlorothiazide (HYDRODIURIL) 12.5 MG tablet Take 1 tablet (12.5 mg) by mouth daily 8/11/21   Ajay Argueta MD   losartan (COZAAR) 100 MG tablet Take 1 tablet (100 mg) by mouth daily 8/11/21   Ajay Argueta MD   metFORMIN (GLUCOPHAGE) 1000 MG tablet Take 1,000 mg by mouth daily (with dinner)    Unknown, Entered By History   metFORMIN (GLUCOPHAGE) 500 MG tablet [METFORMIN (GLUCOPHAGE) 500 MG TABLET] TAKE 1 TABLET BY MOUTH EVERY MORNING AND 2 TABLETS AT NIGHT.  Patient taking differently: Take 500 mg by mouth daily before breakfast  9/10/19   Dillan Kim MD   methotrexate 2.5 MG tablet Take 12.5 mg by mouth once a week Tuesdays 3/1/17   Provider, Historical   metoprolol succinate (TOPROL-XL) 100 MG 24 hr tablet [METOPROLOL SUCCINATE (TOPROL-XL) 100 MG 24 HR TABLET] TAKE 1 TABLET(100 MG) BY MOUTH DAILY 6/17/19   Dillan Kim MD   nitroglycerin (NITROSTAT) 0.4 MG SL tablet [NITROGLYCERIN (NITROSTAT) 0.4 MG SL TABLET] Place 1 tablet (0.4 mg total) under the tongue every 5 (five) minutes as needed for chest pain. 2/14/19   Ajay Argueta MD   pravastatin (PRAVACHOL) 80 MG tablet [PRAVASTATIN (PRAVACHOL) 80 MG TABLET] Take 1 tablet (80 mg total) by mouth at bedtime. 6/9/21   Ajay Argueta MD   tamsulosin (FLOMAX) 0.4 mg cap [TAMSULOSIN (FLOMAX) 0.4 MG CAP] Take 1 capsule (0.4 mg total) by mouth 2 (two) times a day. 12/28/19   Dillan Kim MD

## 2022-01-04 NOTE — CONSULTS
CLINICAL NUTRITION SERVICES - EDUCATION NOTE    Received diet education consult for Dietitian to see for Heart Healthy Diet education     NUTRITION HISTORY:  Information obtained from patient and chart:    Diet:  Regular  ?  Living situation:     ?  Grocery shopping:  self  ?  Meal preparation:  Self- Patient stated he mostly eats foods on the Foods Not Recommended food list  ?  Breakfast:  Sometimes skips otherwise has [employer] breakfast sandwich  ?  Lunch:  Doesn't eat lunch  ?  Dinner:   Patient stated 'nothing' or 'whatever'    Snacks/Supplements:  Doesn't snack    Fluids:  Coffee  ?  Previous diet instructions:  Was previously given heart healthy diet education    ?  NUTRITION DIAGNOSIS:  Food- and nutrition-related knowledge deficit related to HLD, HTN, NSTEMI as evidenced by eating high saturated fat and refined grains at baseline      INTERVENTIONS:  Provided instruction on heart healthy diet     Provided  the following handouts: Heart Healthy Reduced Sodium Nutrition Therapy    Goals:  Patient verbalizes understanding of diet by adhering to diet recommendations     Follow Up:  Patient to ask any further nutrition-related questions before discharge. In addition, pt may request outpatient RD appointment.     Please place additional consult if further nutrition concerns arise.

## 2022-01-04 NOTE — PLAN OF CARE
Problem: Pain (Cardiac Catheterization)  Goal: Acceptable Pain Control  Outcome: Improving   Denied pain.  Problem: Hypertension Acute  Goal: Blood Pressure Within Desired Range  Outcome: Improving   /74  MD notified and PRN hydralazine given recheck /71. Will continue to monitor.

## 2022-01-04 NOTE — PLAN OF CARE
Problem: Adult Inpatient Plan of Care  Goal: Plan of Care Review  Outcome: Improving  Flowsheets (Taken 1/4/2022 0501)  Plan of Care Reviewed With: patient  Progress: improving  Goal: Absence of Hospital-Acquired Illness or Injury  Intervention: Identify and Manage Fall Risk  Recent Flowsheet Documentation  Taken 1/4/2022 0352 by Olivia Batres RN  Safety Promotion/Fall Prevention:   assistive device/personal items within reach   bed alarm on   lighting adjusted   room door open   safety round/check completed  Taken 1/4/2022 0000 by Olivia Batres RN  Safety Promotion/Fall Prevention:   assistive device/personal items within reach   bed alarm on   lighting adjusted   room door open   safety round/check completed  Intervention: Prevent Skin Injury  Recent Flowsheet Documentation  Taken 1/4/2022 0352 by Olivia Batres RN  Body Position: position changed independently  Taken 1/4/2022 0000 by Olivia Batres RN  Body Position: position changed independently     Problem: Ongoing Anesthesia/Sedation Effects (Cardiac Catheterization)  Goal: Anesthesia/Sedation Recovery  Intervention: Optimize Anesthesia Recovery  Recent Flowsheet Documentation  Taken 1/4/2022 0352 by Olivia Batres RN  Safety Promotion/Fall Prevention:   assistive device/personal items within reach   bed alarm on   lighting adjusted   room door open   safety round/check completed  Taken 1/4/2022 0000 by Olivia Batres RN  Safety Promotion/Fall Prevention:   assistive device/personal items within reach   bed alarm on   lighting adjusted   room door open   safety round/check completed     Vitals:    01/03/22 2005 01/03/22 2108 01/03/22 2347 01/04/22 0352   BP: (!) 174/70 (!) 176/74 139/60 (!) 164/73   BP Location: Left arm  Left arm Left arm   Patient Position:    Supine   Pulse: 59  56 70   Resp: 18  15 18   Temp: 97.6  F (36.4  C)  97.8  F (36.6  C) 97.4  F (36.3  C)   TempSrc: Oral  Oral Oral   SpO2: 95%  96% 96%   Weight:    104.7 kg (230 lb  12.8 oz)   Height:        Denies pain. Right radial site CDI. Lungs clear independent. Room air. Olivia Batres RN

## 2022-01-05 ENCOUNTER — PATIENT OUTREACH (OUTPATIENT)
Dept: CARE COORDINATION | Facility: CLINIC | Age: 77
End: 2022-01-05
Payer: COMMERCIAL

## 2022-01-05 DIAGNOSIS — Z71.89 OTHER SPECIFIED COUNSELING: ICD-10-CM

## 2022-01-05 NOTE — PROGRESS NOTES
"Clinic Care Coordination Contact  [facility]: Post-Discharge Note  SITUATION                                                      Admission:    Admission Date: 01/03/22   Reason for Admission: NSTEMI, status post PCI to LAD and RCA  Discharge:   Discharge Date: 01/04/22  Discharge Diagnosis: NSTEMI, status post PCI to LAD and RCA    BACKGROUND                                                      76-year-old male with a history of known coronary artery disease admitted to [facility] with an NSTEMI.  Initially treated with heparin drip and transferred to [facility] for coronary angiography.  Angiogram done 1/3/2022 and patient had stenting done to LAD and RCA.  Noted to have preserved ejection fraction.  Continued on previous cardiac medications with addition of aspirin to regimen and atorvastatin in place of pravastatin.  Diabetes remained stable during time of stay.  Eating well and chest pain-free and discharged in good condition to complete outpatient cardiac rehab.    ASSESSMENT      Enrollment  Primary Care Care Coordination Status: Not a Candidate    Discharge Assessment  How are you doing now that you are home?: \"Pretty good\"  How are your symptoms? (Red Flag symptoms escalate to triage hotline per guidelines): Improved  Do you feel your condition is stable enough to be safe at home until your provider visit?: Yes  Does the patient have their discharge instructions? : Yes  Does the patient have questions regarding their discharge instructions? : No  Were you started on any new medications or were there changes to any of your previous medications? : Yes  Does the patient have all of their medications?: Yes  Do you have questions regarding any of your medications? : No  Do you have all of your needed medical supplies or equipment (DME)?  (i.e. oxygen tank, CPAP, cane, etc.): Yes  Discharge follow-up appointment scheduled within 14 calendar days? : No  Is patient agreeable to assistance " with scheduling? : No    Post-op (W CTA Only)  If the patient had a surgery or procedure, do they have any questions for a nurse?: No        PLAN                                                      Outpatient Plan:    Follow-up and recommended labs and tests       ---Follow up with primary care in 1-2 weeks for post hospital check  ---Follow up in post PCI clinic in 1-2 weeks   ---Dr. Argueta in approximately 6 weeks.  ---Referral placed to cardiac rehab    Future Appointments   Date Time Provider Department Center   1/10/2022  1:30 PM [facility]   1/21/2022  7:50 AM [facility]   3/1/2022  8:20 AM [facility]         For any urgent concerns, please contact our 24 hour nurse triage line: [phone] ([phone])         HOANG Butler  [phone]  Connected Care Resource [facility]

## 2022-01-05 NOTE — DISCHARGE SUMMARY
[facility]  Hospitalist Discharge Summary      Date of Admission:  1/3/2022  Date of Discharge:  1/4/2022  1:25 PM  Discharging Provider: Vannessa Hankins MD      Discharge Diagnoses       NSTEMI, status post PCI to LAD and RCA    Hypertension    Coronary artery disease    Diabetes mellitus, type II    BPH    Follow-ups Needed After Discharge   Follow-up Appointments     Follow-up and recommended labs and tests       ---Follow up with primary care in 1-2 weeks for post hospital check  ---Follow up in post PCI clinic in 1-2 weeks   ---Dr. Argueta in approximately 6 weeks.  ---Referral placed to cardiac rehab             Unresulted Labs Ordered in the Past 30 Days of this Admission     No orders found for last 31 day(s).          Discharge Disposition   Discharged to home  Condition at discharge: Stable      Hospital Course     76-year-old male with a history of known coronary artery disease admitted to [facility] with an NSTEMI.  Initially treated with heparin drip and transferred to [facility] for coronary angiography.  Angiogram done 1/3/2022 and patient had stenting done to LAD and RCA.  Noted to have preserved ejection fraction.  Continued on previous cardiac medications with addition of aspirin to regimen and atorvastatin in place of pravastatin.  Diabetes remained stable during time of stay.  Eating well and chest pain-free and discharged in good condition to complete outpatient cardiac rehab    Consultations This Hospital Stay   PHARMACY IP CONSULT  PHARMACY IP CONSULT  ADVANCE DIRECTIVE IP CONSULT  NUTRITION SERVICES ADULT IP CONSULT  CARDIAC REHAB IP CONSULT  PHARMACY IP CONSULT  PHARMACY IP CONSULT  HOSPITALIST IP CONSULT  SMOKING CESSATION PROGRAM IP CONSULT    Code Status   Prior    Time Spent on this Encounter   I, Vannessa Hankins MD, personally saw the patient today and spent greater than 30 minutes discharging this patient.       Vannessa Hankins MD  [facility]  [facility]  [facility] [facility] [address]  Phone: [phone]  Fax: [phone]  ______________________________________________________________________    Physical Exam   Vital Signs: Temp: 97.2  F (36.2  C) Temp src: Oral BP: (!) 149/71 Pulse: 62   Resp: 18 SpO2: 95 % O2 Device: None (Room air)    Weight: 230 lbs 12.8 oz  General Appearance: Pleasant, no apparent distress  Respiratory: Clear to auscultation bilaterally  Cardiovascular: Regular rate and rhythm without murmurs rubs or gallop  GI: Soft and nontender without hepatosplenomegaly  Skin: No significant rash  Other: Neurologically grossly intact without focal deficits appreciated       Primary Care Physician   [facility]    Discharge Orders      Lipid Profile     Cardiac Rehab Referral      Follow-Up with Cardiologist      Reason for your hospital stay    NSTEMI with stent placement     Follow-up and recommended labs and tests     ---Follow up with primary care in 1-2 weeks for post hospital check  ---Follow up in post PCI clinic in 1-2 weeks   ---Dr. Argueta in approximately 6 weeks.  ---Referral placed to cardiac rehab     Activity    Your activity upon discharge: activity as tolerated and no driving for today     Diet    Follow this diet upon discharge: Orders Placed This Encounter      Low Saturated Fat Na <2400 mg       Significant Results and Procedures   Most Recent 3 CBC's:Recent Labs   Lab Test 01/04/22  0452 01/01/22  1425 01/01/22  0207   WBC 7.0 7.7 8.0   HGB 12.8* 13.0* 13.8   MCV 93 92 93    240 277     Most Recent 3 BMP's:Recent Labs   Lab Test 01/04/22  0452 01/03/22  1626 01/01/22  1637 01/01/22  0207 03/08/19  1539     --   --  139 138   POTASSIUM 3.7  --   --  3.5 3.6   CHLORIDE 103  --   --  102 102   CO2 23  --   --  28 25   BUN 15  --   --  12 18   CR 0.96  --   --  1.16 0.91   ANIONGAP 12  --   --  9 11   HARMEET 10.0  --   --  9.4 10.0    106* 105* 137* 86   ,    Results for orders placed or performed during the hospital encounter of 22   Echocardiogram Complete     Value    LVEF  55-60%    Narrative    [id number]  [id number]  [id number]  [id number]^ОЛЬГА^[place] [address]     Name: PAUL DOTSON  MRN: [id number]  : 1945  Study Date: 2022 12:45 PM  Age: 76 yrs  Gender: Male  Patient Location: [facility]  Reason For Study: CAD  Ordering Physician: CATHY ROLON  Performed By: TANJA     BSA: 2.1 m2  Height: 70 in  Weight: 214 lb  ______________________________________________________________________________  ______________________________________________________________________________  Interpretation Summary     Technically challenging study.Definity contrast utlilized.  The left ventricle is normal in size.  There is mild concentric left ventricular hypertrophy.  Left ventricular systolic function is normal.  The visual ejection fraction is 55-60%.  Normal left ventricular wall motion  The right ventricle is not well visualized.  TAPSE is normal, which is consistent with normal right ventricular systolic  function.  The left atrium is not well visualized.  Right atrium not well visualized.  Valve structures are not optimally visualized.No obvious significant valve  abnormality observed  TDS-Definity used (no complications)  The aortic root measures mildly enlarged  ______________________________________________________________________________  I      WMSI = 1.00     % Normal = 100     X - Cannot   0 -                      (2) - Mildly 2 -          Segments  Size  Interpret    Hyperkinetic 1 - Normal  Hypokinetic  Hypokinetic  1-2     small                                                     7 -          3-5      moderate  3 - Akinetic 4 -          5 -         6 - Akinetic Dyskinetic   6-14    large               Dyskinetic   Aneurysmal  w/scar       w/scar       15-16   diffuse     Left Ventricle  The left  ventricle is normal in size. There is mild concentric left  ventricular hypertrophy. Diastolic Doppler findings (E/E' ratio and/or other  parameters) suggest left ventricular filling pressures are normal. The visual  ejection fraction is 55-60%. Left ventricular systolic function is normal.  Normal left ventricular wall motion.     Right Ventricle  The right ventricle is not well visualized. TAPSE is normal, which is  consistent with normal right ventricular systolic function.     Atria  The left atrium is not well visualized. Right atrium not well visualized.     Mitral Valve  The mitral valve leaflets appear thickened, but open well. There is trace  mitral regurgitation.     Tricuspid Valve  The tricuspid valve is not well visualized. There is trace tricuspid  regurgitation. Right ventricle systolic pressure estimate normal.     Aortic Valve  The aortic valve is not well visualized. The aortic valve is trileaflet. No  hemodynamically significant valvular aortic stenosis.     Pulmonic Valve  The pulmonic valve is not well visualized.     Vessels  The aortic root measures mildly enlarged. Inferior vena cava not well  visualized for estimation of right atrial pressure.     Pericardium  There is no pericardial effusion.     ______________________________________________________________________________  MMode/2D Measurements & Calculations  IVSd: 1.3 cm  LVIDd: 5.9 cm  LVIDs: 4.2 cm  LVPWd: 0.97 cm  FS: 29.9 %  LV mass(C)d: 280.7 grams  LV mass(C)dI: 130.7 grams/m2     LVOT diam: 2.0 cm  LVOT area: 3.0 cm2  RWT: 0.33     Time Measurements  MM HR: 49.0 BPM     Doppler Measurements & Calculations  MV E max dieudonne: 60.1 cm/sec  MV A max dieudonne: 85.4 cm/sec  MV E/A: 0.70  MV P1/2t max dieudonne: 76.0 cm/sec  MV P1/2t: 86.1 msec  MVA(P1/2t): 2.6 cm2  MV dec slope: 258.6 cm/sec2  MV dec time: 0.51 sec  LV V1 max P.8 mmHg  LV V1 max: 83.9 cm/sec  LV V1 VTI: 21.6 cm  SV(LVOT): 65.4 ml  SI(LVOT): 30.5 ml/m2  PA acc time: 0.11 sec  TR  max dieudonne: 249.8 cm/sec  TR max P.0 mmHg  E/E' av.2  Lateral E/e': 11.5  Medial E/e': 14.9     ______________________________________________________________________________  Report approved by: Kleber Wiley 2022 03:07 PM         Cardiac Catheterization    Narrative      NSTEMI in an obese diabetic with hypertension, hyperlipidemia, and CAD,   s/p PCI of the RCA with rotational atherectomy in 2019 (images   unavailable).    Diffuse, severe coronary calcification.    Minimal left main disease.    Mild proximal and mid LAD disease with a focal, severe mid LAD stenosis   which was stented with a Synergy RANCHO with good angiographic results. There   is a severe ostial lesion in a small mid diagonal. The distal LAD is   diffusely small.    Large circumflex with mild proximal disease and moderate distal   circumflex prior to the bifurcation of the large OM-3 and OM-4, both of   which have moderate stenoses.    Large RCA is diffusely and severely calcified. The previous stents cover   the proximal to distal RCA and there is a focal segment of severe   restenosis in the distal RCA. PCI was difficult due to vessel angulation   and calcification despite using an AL-1 guide and a Guideliner support   catheter. See notes below. Successful PCI with a Synergy RANCHO with good   angiographic results. Residual moderate distal RCA disease.    LV EDP elevated at 22 mmHg.            Discharge Medications   Discharge Medication List as of 2022  1:14 PM      START taking these medications    Details   aspirin (ASA) 81 MG chewable tablet Take 1 tablet (81 mg) by mouth daily Starting tomorrow., Disp-30 tablet, R-3, E-Prescribe      atorvastatin (LIPITOR) 80 MG tablet Take 1 tablet (80 mg) by mouth daily, Disp-90 tablet, R-3, E-Prescribe         CONTINUE these medications which have CHANGED    Details   clopidogrel (PLAVIX) 75 MG tablet Take 1 tablet (75 mg) by mouth daily Dose to start tomorrow., Disp-90 tablet, R-3,  "E-Prescribe      nitroGLYcerin (NITROSTAT) 0.4 MG sublingual tablet One tablet under the tongue every 5 minutes if needed for chest pain. May repeat every 5 minutes for a maximum of 3 doses in 15 minutes\", Disp-25 tablet, R-3, E-Prescribe         CONTINUE these medications which have NOT CHANGED    Details   albuterol (PROAIR HFA;PROVENTIL HFA;VENTOLIN HFA) 90 mcg/actuation inhaler [ALBUTEROL (PROAIR HFA;PROVENTIL HFA;VENTOLIN HFA) 90 MCG/ACTUATION INHALER] Inhale 1 puff daily as needed., HistoricalMay substitute the equivalent medication per insurance preference.      blood glucose test strips [BLOOD GLUCOSE TEST STRIPS] Use 1 each As Directed 2 (two) times a day. Dispense brand per patient's insurance at pharmacy discretion., Disp-180 strip, R-1, Normal      diltiazem ER COATED BEADS (CARDIZEM CD/CARTIA XT) 120 MG 24 hr capsule Take 1 capsule (120 mg) by mouth daily, Disp-90 capsule, R-1, E-Prescribe      folic acid (FOLVITE) 1 MG tablet Take 2 mg by mouth daily Do not take on methotrexate days (Tuesday), R-4, Historical      generic lancets [GENERIC LANCETS] Use 1 each As Directed 4 (four) times a day., Disp-100 each, R-6, NormalPt prefers fastclix rather than softclix      hydrochlorothiazide (HYDRODIURIL) 12.5 MG tablet Take 1 tablet (12.5 mg) by mouth daily, Disp-90 tablet, R-1, E-Prescribe      losartan (COZAAR) 100 MG tablet Take 1 tablet (100 mg) by mouth daily, Disp-90 tablet, R-1, E-Prescribe      !! metFORMIN (GLUCOPHAGE) 1000 MG tablet Take 1,000 mg by mouth daily (with dinner), Historical      !! metFORMIN (GLUCOPHAGE) 500 MG tablet [METFORMIN (GLUCOPHAGE) 500 MG TABLET] TAKE 1 TABLET BY MOUTH EVERY MORNING AND 2 TABLETS AT NIGHT., Disp-270 tablet, R-3, Normal      methotrexate 2.5 MG tablet Take 12.5 mg by mouth once a week Tuesdays, Historical      metoprolol succinate (TOPROL-XL) 100 MG 24 hr tablet [METOPROLOL SUCCINATE (TOPROL-XL) 100 MG 24 HR TABLET] TAKE 1 TABLET(100 MG) BY MOUTH DAILY, Disp-90 " tablet, R-2, Normal      tamsulosin (FLOMAX) 0.4 mg cap [TAMSULOSIN (FLOMAX) 0.4 MG CAP] Take 1 capsule (0.4 mg total) by mouth 2 (two) times a day., Disp-180 capsule, R-0, Normal       !! - Potential duplicate medications found. Please discuss with provider.      STOP taking these medications       pravastatin (PRAVACHOL) 80 MG tablet Comments:   Reason for Stopping:             Allergies   No Known Allergies

## 2022-01-05 NOTE — PLAN OF CARE
Occupational Therapy Discharge Summary    Reason for therapy discharge:    Discharged to home.    Progress towards therapy goal(s). See goals on Care Plan in [facility] electronic health record for goal details.  Goals met    Therapy recommendation(s):    Continued therapy is recommended.  Rationale/Recommendations:  outpatient CR.    Bre MARCOS, OTR/L, CLT 1/5/2022 , 9:43 AM

## 2022-01-17 ENCOUNTER — HOSPITAL ENCOUNTER (OUTPATIENT)
Dept: CARDIAC REHAB | Facility: CLINIC | Age: 77
End: 2022-01-17
Attending: INTERNAL MEDICINE
Payer: COMMERCIAL

## 2022-01-17 DIAGNOSIS — E78.2 MIXED HYPERLIPIDEMIA: ICD-10-CM

## 2022-01-17 PROCEDURE — 999N000109 HC STATISTIC OP CR VISIT: Performed by: OCCUPATIONAL THERAPIST

## 2022-01-17 PROCEDURE — 999N000108 HC STATISTIC OP CARDIAC VISIT #2: Performed by: OCCUPATIONAL THERAPIST

## 2022-01-17 PROCEDURE — 93797 PHYS/QHP OP CAR RHAB WO ECG: CPT | Mod: XU | Performed by: OCCUPATIONAL THERAPIST

## 2022-01-17 PROCEDURE — 93798 PHYS/QHP OP CAR RHAB W/ECG: CPT | Performed by: OCCUPATIONAL THERAPIST

## 2022-01-20 PROBLEM — R07.9 CHEST PAIN: Status: RESOLVED | Noted: 2022-01-01 | Resolved: 2022-01-20

## 2022-01-20 PROBLEM — R07.9 CHEST PAIN, UNSPECIFIED TYPE: Status: RESOLVED | Noted: 2022-01-01 | Resolved: 2022-01-20

## 2022-01-21 ENCOUNTER — TELEPHONE (OUTPATIENT)
Dept: CARDIOLOGY | Facility: CLINIC | Age: 77
End: 2022-01-21

## 2022-01-21 ENCOUNTER — OFFICE VISIT (OUTPATIENT)
Dept: CARDIOLOGY | Facility: CLINIC | Age: 77
End: 2022-01-21
Payer: COMMERCIAL

## 2022-01-21 VITALS
RESPIRATION RATE: 16 BRPM | SYSTOLIC BLOOD PRESSURE: 130 MMHG | HEART RATE: 64 BPM | WEIGHT: 232 LBS | BODY MASS INDEX: 33.21 KG/M2 | HEIGHT: 70 IN | DIASTOLIC BLOOD PRESSURE: 70 MMHG

## 2022-01-21 DIAGNOSIS — I25.83 CORONARY ARTERY DISEASE DUE TO LIPID RICH PLAQUE: ICD-10-CM

## 2022-01-21 DIAGNOSIS — I25.10 CORONARY ARTERY DISEASE DUE TO LIPID RICH PLAQUE: ICD-10-CM

## 2022-01-21 DIAGNOSIS — I21.4 NSTEMI (NON-ST ELEVATED MYOCARDIAL INFARCTION) (H): ICD-10-CM

## 2022-01-21 DIAGNOSIS — I10 ESSENTIAL HYPERTENSION: ICD-10-CM

## 2022-01-21 DIAGNOSIS — I48.91 ATRIAL FIBRILLATION, UNSPECIFIED TYPE (H): ICD-10-CM

## 2022-01-21 DIAGNOSIS — I71.40 ABDOMINAL AORTIC ANEURYSM (AAA) WITHOUT RUPTURE (H): ICD-10-CM

## 2022-01-21 DIAGNOSIS — R07.9 ACUTE CHEST PAIN: Primary | ICD-10-CM

## 2022-01-21 DIAGNOSIS — E78.2 MIXED HYPERLIPIDEMIA: ICD-10-CM

## 2022-01-21 DIAGNOSIS — E11.9 TYPE 2 DIABETES MELLITUS TREATED WITHOUT INSULIN (H): ICD-10-CM

## 2022-01-21 DIAGNOSIS — Z98.61 PERCUTANEOUS TRANSLUMINAL CORONARY ANGIOPLASTY STATUS: ICD-10-CM

## 2022-01-21 PROBLEM — I48.0 PAROXYSMAL ATRIAL FIBRILLATION (H): Status: ACTIVE | Noted: 2019-04-29

## 2022-01-21 PROCEDURE — 99215 OFFICE O/P EST HI 40 MIN: CPT | Performed by: NURSE PRACTITIONER

## 2022-01-21 ASSESSMENT — MIFFLIN-ST. JEOR: SCORE: 1783.6

## 2022-01-21 NOTE — TELEPHONE ENCOUNTER
Edu Schmitz APRN CNP Caswell, Mallory J, RN  Cc: Ajay Argueta MD  [facility],   Could you please call pt with recommendation from Dr. Argueta.   Please tell him to disregard my [facility] message to start cardiac rehab.   CY              Previous Messages       ----- Message -----   From: Ajay Argueta MD   Sent: 1/21/2022   2:31 PM CST   To: CATRACHITA Regalado CNP     I had a chance to review the [place] records, given this I think it would be reasonable to not be active, refrain from cardiac rehab, and after 3 months of antiplatelet therapy put on hold for a week for [place] to address the endoleak.  LF   ----- Message -----   From: Edu Schmitz APRN CNP   Sent: 1/21/2022   1:56 PM CST   To: MD GRICEL YoussefI:His medical record from [facility] is now available in care everywhere   ----- Message -----   From: Ajay Argueta MD   Sent: 1/21/2022  10:00 AM CST   To: CATRACHITA Regalado CNP     This is an abdominal graft, with light cardiac rehab I anticipate no issues and think it would be reasonable to pursue.  LF   ----- Message -----   From: Edu Schmitz APRN CNP   Sent: 1/21/2022   9:56 AM CST   To: MD Dr. Ellie Youssef,   Saw Mr. Ronquillo for post PCI follow-up.  He is stable except he states that he went to [facility] yesterday to have aortic stent since he was told that there is a leak from his previous graft however, they told him to wait for another 3 to 4 months because of his recent cardiac stent placement.  He is scheduled for cardiac rehab coming Monday.   He wants to make sure it safe for him to participate in cardiac rehab.   I do not have access to [facility] chart.  I sent request to medical records to obtain most recent notes from [facility] or have patient sign release information so we can access his medical record from [facility] on Care Everywhere.     Would you recommend holding off on cardiac rehab until we have more information from [facility] or initiate cardiac  rehab ?   CY              ==Called Koko and updated on recommendation from CY and LBF. He will hold off on cardiac rehab at this time and follow up as scheduled. -[facility]

## 2022-01-21 NOTE — LETTER
1/21/2022    Dustin Mccracken MD  [facility] [address]  [facility] [address]    RE: Koko Ronquillo       Dear Colleague,     I had the pleasure of seeing Koko Ronquillo in the [facility].          Assessment/Recommendations   Assessment:    1.  Coronary artery disease with status post PCI to RCA in the past: Recent hospitalization with non-STEMI.  Coronary angiogram showed 90% stenosis in mid LAD 90% stenosis and second diagonal, and 80% stenosis and distal RCA-1 lesion.  Lesions in LAD and distal RCA were successfully treated with a drug-eluting stents (see angio result for detail)    On dual antiplatelet therapy with ASA 81 mg indefinitely and  Clopidogrel (Plavix) 75 mg daily mg daily for 12 months.  Patient denies any chest pain/heartburn since recent stent placement    Cardiac rehab has been ordered/scheduled on 1/24/2022.    Right radial site is intact    Reviewed most recent BMP, Hgb, platelet- stable.    2.  Dyslipidemia with LDL goal <70/Obesity with a BMI of: Koko Ronquillo is on high intensity statin therapy with atorvastatin 80 mg daily.  He was switched from pravastatin to atorvastatin.  Tolerating with no adverse effect. Most recent LDL is 58 and triglycerides 241.  Most recent AST/ALT are stable.    3.  Hypertension: His blood pressure is well controlled at 130/70. Currently on hydrochlorothiazide, losartan, diltiazem, and metoprolol succinate.    4.  Diabetes: Most recent A1C is 6.1 in April 2021.  On metformin.    5.  History of abdominal aortic aneurysm with status post endoluminal graft in December 2019: On Plavix.  Patient states he went to the [facility] yesterday to have aortic stent as he was told there is a leak.  However, he was recommended to wait for 3 to 4 months with his recent cardiac stents.  Notes not available for review or have access in care everywhere.    He is wondering if he is safe to have cardiac rehab given he was told  that there is a leak.  He is asymptomatic.    6. paroxysmal atrial fibrillation: Event monitor in April 2021 showed no evidence of atrial fibrillation.  On diltiazem 180 mg daily for rate control.  He is asymptomatic.    Plan:  - We discussed the importance of antiplatelet therapy and talking with his cardiologist prior to stopping these medications for any reason.  We discussed about utilization of as needed nitroglycerin.     - Encouraged to seek medical attention if recurrent chest pain or shortness of breath.    - Risk factor modification and lifestyle management topics were discussed including managing comorbidities, weight loss, heart healthy diet, exercise, stress reduction and alcohol use.      - Fasting lipid profile check in 4 weeks order placed.  Instructed to fast for 8 to 12 hours prior to lab work.    - Cardiac rehab as scheduled for now.  Will discuss with Dr. Argueta and patricia if he is supposed to hold off on cardiac rehab.low up with patient will work with medical records to obtain his most recent [facility] visit.    -Patient was instructed to avoid strenuous activity, high intense exercise or lifting any heavy objects.    - We discussed a diet low in saturated fat, weight loss, and exercise along with medication for better control of cholesterol.  Highly encouraged to participate in nutrition class in cardiac rehab.    - Continue current hypertension regimen    - We discussed the importance of tightly controlled blood sugars to minimize risk of worsening coronary artery disease. Defer to PCP for diabetes managment.  Instructed patient to discuss with PCP regarding referral to diabetes educator since he had several questions about diabetes management and possible complications.    Follow up with Dr. Argueta as scheduled on 3/1/22.  Follow-up with PCP as scheduled.     History of Present Illness/Subjective    Mr. Koko Ronquillo is a 76 year old male with a past medical history of coronary artery  disease with status post PCI to RCA in the past, abdominal aortic aneurysm without rupture with status post endoluminal graft in December 2019, pure hypercholesterolemia, hypertension, obesity, obstructive sleep apnea on CPAP, diabetes mellitus type 2, and paroxysmal atrial fibrillation who is seen at [facility] for post coronary intervention follow up.     Patient was recently hospitalized with non-STEMI.  Coronary angiogram showed severe lesion in mid LAD and distal RCA which were successfully treated with drug-eluting stents.  Patient was switched from pravastatin to atorvastatin.  Echocardiogram showed preserved LVEF with no obvious valve abnormalities noted.    Today, Koko reports that his chest pain/heartburn resolved since recent stent placement.  He denies fatigue, lightheadedness, shortness of breath, dyspnea on exertion, orthopnea, PND, palpitations, chest pain, abdominal fullness/bloating and lower extremity edema.  He states that he went to the [facility] yesterday to have stent as he was told that there is a leak from his aortic aneurysm.  However, he was recommended to wait for another 3 to 4 months because of his recent cardiac stent placement.  He is wondering if he is safe to have cardiac rehab.  He is starting cardiac rehab next Monday.  He is also establishing care with a new primary care provider.  He has several questions about diabetes management.    Coronary Angiogram 1/3/2022: reviewed:  Conclusion    NSTEMI in an obese diabetic with hypertension, hyperlipidemia, and CAD, s/p PCI of the RCA with rotational atherectomy in 2019 (images unavailable).    Diffuse, severe coronary calcification.    Minimal left main disease.    Mild proximal and mid LAD disease with a focal, severe mid LAD stenosis which was stented with a Synergy RANCHO with good angiographic results. There is a severe ostial lesion in a small mid diagonal. The distal LAD is diffusely  small.    Large circumflex with mild proximal disease and moderate distal circumflex prior to the bifurcation of the large OM-3 and OM-4, both of which have moderate stenoses.    Large RCA is diffusely and severely calcified. The previous stents cover the proximal to distal RCA and there is a focal segment of severe restenosis in the distal RCA. PCI was difficult due to vessel angulation and calcification despite using an AL-1 guide and a Guideliner support catheter. See notes below. Successful PCI with a Synergy RANCHO with good angiographic results. Residual moderate distal RCA disease.    LV EDP elevated at 22 mmHg.     Recommendations  General Recommendations:  - Follow up visit with Nurse Practitioner in 1-2 weeks.   - Recommended follow up with doctor Dr. Argueta in 4-6 weeks.   - The patient will be observed in telemetry overnight and if stable, the patient will be allowed to return home tomorrow.   - Arterial sheath removed from radial artery with TR Band placement.   - Recommend cardiac rehabilitation.     Medications:  - Continue dual antiplatelet therapy for 12 month(s).   - Continue high dose statin therapy indefinitely.   - Risk factor management for atherosclerosis.     ECHO done on 1/3/2022-Reviewed:   Interpretation Summary     Technically challenging study.Definity contrast utlilized.  The left ventricle is normal in size.  There is mild concentric left ventricular hypertrophy.  Left ventricular systolic function is normal.  The visual ejection fraction is 55-60%.  Normal left ventricular wall motion  The right ventricle is not well visualized.  TAPSE is normal, which is consistent with normal right ventricular systolic  function.  The left atrium is not well visualized.  Right atrium not well visualized.  Valve structures are not optimally visualized.No obvious significant valve  abnormality observed  TDS-Definity used (no complications)  The aortic root measures mildly enlarged       Physical  Examination Review of Systems   There were no vitals taken for this visit.  There is no height or weight on file to calculate BMI.  Wt Readings from Last 3 Encounters:   01/04/22 104.7 kg (230 lb 12.8 oz)   01/03/22 106.3 kg (234 lb 6.4 oz)   03/29/21 105.2 kg (232 lb)     General Appearance:   no distress, normal body habitus   ENT/Mouth: membranes moist, no oral lesions or bleeding gums.      EYES:  no scleral icterus, normal conjunctivae   Neck: no carotid bruits or thyromegaly   Chest/Lungs:   lungs are clear to auscultation, no rales or wheezing, equal chest wall expansion    Cardiovascular:    Heart rate regular. Normal first and second heart sounds with no murmurs, rubs, or gallops; the carotid, radial and posterior tibial pulses are intact, Jugular venous pressure flat, no edema bilaterally    Abdomen:  no organomegaly, masses, bruits, or tenderness; bowel sounds are present   Extremities  Puncture Site: no cyanosis or clubbing  Right radial site is soft with no bruising.  Radial pulses and Pedal pulses intact and symmetrical.  CMS intact.   Skin: no xanthelasma, warm.    Neurologic: normal  bilateral, no tremors     Psychiatric: alert and oriented x3, calm                Negative unless noted in HPI     Medical History  Surgical History Family History Social History   Past Medical History:   Diagnosis Date     Anxiety      Atrial fibrillation (H)      Chest pain made worse by breathing 4/21/2016     CIS (carcinoma in situ of bladder) 8/17/2017     Fatigue 5/19/2016     Hematuria 3/1/2017     HLD (hyperlipidemia)      HTN (hypertension)      Psoriasis      RA (rheumatoid arthritis) (H)      Sleep apnea     does not use CPAP    Past Surgical History:   Procedure Laterality Date     ABDOMINAL AORTIC ANEURYSM REPAIR  1995     CV CORONARY ANGIOGRAM N/A 2/8/2019    Procedure: Coronary Angiogram;  Surgeon: El De Oliveira MD;  Location: [facility];  Service: Cardiology     CV CORONARY ANGIOGRAM  N/A 1/3/2022    Procedure: CV CORONARY ANGIOGRAM;  Surgeon: Hailee Pierce MD;  Location: [facility] LAB CV     CV LEFT HEART CATH N/A 1/3/2022    Procedure: Left Heart Cath;  Surgeon: Hailee Pierce MD;  Location: [facility] LAB CV     CV PCI N/A 1/3/2022    Procedure: Percutaneous Coronary Intervention;  Surgeon: Hailee Pierce MD;  Location: [facility] CV     CYSTOSCOPY, TRANSURETHRAL RESECTION (TUR) TUMOR BLADDER, COMBINED N/A 3/1/2017    Procedure: CYSTOSCOPY BLADDER BIOPSY AND FULGURATION;  Surgeon: Kostas Smith MD;  Location: [facility];  Service:      HERNIA REPAIR      with nesh     IR MISCELLANEOUS PROCEDURE  2007     IR THORACIC AORTOGRAM  2007     OTHER SURGICAL HISTORY      aortic bypass     OTHER SURGICAL HISTORY      nasal cauterization    Family History   Problem Relation Age of Onset     Acute Myocardial Infarction Father 58.00    Social History     Socioeconomic History     Marital status:      Spouse name: Not on file     Number of children: Not on file     Years of education: Not on file     Highest education level: Not on file   Occupational History     Not on file   Tobacco Use     Smoking status: Former Smoker     Quit date: 2007     Years since quittin.9     Smokeless tobacco: Never Used   Substance and Sexual Activity     Alcohol use: Yes     Comment: Alcoholic Drinks/day: occasional     Drug use: No     Sexual activity: Not on file   Other Topics Concern     Not on file   Social History Narrative     Not on file     Social Determinants of Health     Financial Resource Strain: Not on file   Food Insecurity: Not on file   Transportation Needs: Not on file   Physical Activity: Not on file   Stress: Not on file   Social Connections: Not on file   Intimate Partner Violence: Not on file   Housing Stability: Not on file          Medications  Allergies   Current Outpatient Medications   Medication Sig Dispense Refill     albuterol  "(PROAIR HFA;PROVENTIL HFA;VENTOLIN HFA) 90 mcg/actuation inhaler [ALBUTEROL (PROAIR HFA;PROVENTIL HFA;VENTOLIN HFA) 90 MCG/ACTUATION INHALER] Inhale 1 puff daily as needed.       aspirin (ASA) 81 MG chewable tablet Take 1 tablet (81 mg) by mouth daily Starting tomorrow. 30 tablet 3     atorvastatin (LIPITOR) 80 MG tablet Take 1 tablet (80 mg) by mouth daily 90 tablet 3     blood glucose test strips [BLOOD GLUCOSE TEST STRIPS] Use 1 each As Directed 2 (two) times a day. Dispense brand per patient's insurance at pharmacy discretion. 180 strip 1     clopidogrel (PLAVIX) 75 MG tablet Take 1 tablet (75 mg) by mouth daily Dose to start tomorrow. 90 tablet 3     diltiazem ER COATED BEADS (CARDIZEM CD/CARTIA XT) 120 MG 24 hr capsule Take 1 capsule (120 mg) by mouth daily 90 capsule 1     folic acid (FOLVITE) 1 MG tablet Take 2 mg by mouth daily Do not take on methotrexate days (Tuesday)  4     generic lancets [GENERIC LANCETS] Use 1 each As Directed 4 (four) times a day. 100 each 6     hydrochlorothiazide (HYDRODIURIL) 12.5 MG tablet Take 1 tablet (12.5 mg) by mouth daily 90 tablet 1     losartan (COZAAR) 100 MG tablet Take 1 tablet (100 mg) by mouth daily 90 tablet 1     metFORMIN (GLUCOPHAGE) 1000 MG tablet Take 1,000 mg by mouth daily (with dinner)       metFORMIN (GLUCOPHAGE) 500 MG tablet [METFORMIN (GLUCOPHAGE) 500 MG TABLET] TAKE 1 TABLET BY MOUTH EVERY MORNING AND 2 TABLETS AT NIGHT. (Patient taking differently: Take 500 mg by mouth daily before breakfast ) 270 tablet 3     methotrexate 2.5 MG tablet Take 12.5 mg by mouth once a week Tuesdays       metoprolol succinate (TOPROL-XL) 100 MG 24 hr tablet [METOPROLOL SUCCINATE (TOPROL-XL) 100 MG 24 HR TABLET] TAKE 1 TABLET(100 MG) BY MOUTH DAILY 90 tablet 2     nitroGLYcerin (NITROSTAT) 0.4 MG sublingual tablet One tablet under the tongue every 5 minutes if needed for chest pain. May repeat every 5 minutes for a maximum of 3 doses in 15 minutes\" 25 tablet 3     " tamsulosin (FLOMAX) 0.4 mg cap [TAMSULOSIN (FLOMAX) 0.4 MG CAP] Take 1 capsule (0.4 mg total) by mouth 2 (two) times a day. 180 capsule 0    No Known Allergies      Lab Results    Chemistry/lipid CBC Cardiac Enzymes/BNP/TSH/INR   Lab Results   Component Value Date    CHOL 132 01/02/2022    HDL 27 (L) 01/02/2022    TRIG 241 (H) 01/02/2022    BUN 15 01/04/2022     01/04/2022    CO2 23 01/04/2022    Lab Results   Component Value Date    WBC 7.0 01/04/2022    HGB 12.8 (L) 01/04/2022    HCT 37.0 (L) 01/04/2022    MCV 93 01/04/2022     01/04/2022    Lab Results   Component Value Date    TROPONINI 0.53 (HH) 01/04/2022    INR 0.95 01/03/2022        50  minutes spent on the date of encounter doing chart review, review of outside records, review of test results, interpretation with above tests, patient visit, documentation and discussion with other provider.        This note has been dictated using voice recognition software. Any grammatical, typographical, or context distortions are unintentional and inherent to the software

## 2022-01-21 NOTE — PROGRESS NOTES
Assessment/Recommendations   Assessment:    1.  Coronary artery disease with status post PCI to RCA in the past: Recent hospitalization with non-STEMI.  Coronary angiogram showed 90% stenosis in mid LAD 90% stenosis and second diagonal, and 80% stenosis and distal RCA-1 lesion.  Lesions in LAD and distal RCA were successfully treated with a drug-eluting stents (see angio result for detail)    On dual antiplatelet therapy with ASA 81 mg indefinitely and  Clopidogrel (Plavix) 75 mg daily mg daily for 12 months.  Patient denies any chest pain/heartburn since recent stent placement    Cardiac rehab has been ordered/scheduled on 1/24/2022.    Right radial site is intact    Reviewed most recent BMP, Hgb, platelet- stable.    2.  Dyslipidemia with LDL goal <70/Obesity with a BMI of: Koko Ronquillo is on high intensity statin therapy with atorvastatin 80 mg daily.  He was switched from pravastatin to atorvastatin.  Tolerating with no adverse effect. Most recent LDL is 58 and triglycerides 241.  Most recent AST/ALT are stable.    3.  Hypertension: His blood pressure is well controlled at 130/70. Currently on hydrochlorothiazide, losartan, diltiazem, and metoprolol succinate.    4.  Diabetes: Most recent A1C is 6.1 in April 2021.  On metformin.    5.  History of abdominal aortic aneurysm with status post endoluminal graft in December 2019: On Plavix.  Patient states he went to the [facility] yesterday to have aortic stent as he was told there is a leak.  However, he was recommended to wait for 3 to 4 months with his recent cardiac stents.  Notes not available for review or have access in care everywhere.    He is wondering if he is safe to have cardiac rehab given he was told that there is a leak.  He is asymptomatic.    6. paroxysmal atrial fibrillation: Event monitor in April 2021 showed no evidence of atrial fibrillation.  On diltiazem 180 mg daily for rate control.  He is asymptomatic.    Plan:  - We discussed  the importance of antiplatelet therapy and talking with his cardiologist prior to stopping these medications for any reason.  We discussed about utilization of as needed nitroglycerin.     - Encouraged to seek medical attention if recurrent chest pain or shortness of breath.    - Risk factor modification and lifestyle management topics were discussed including managing comorbidities, weight loss, heart healthy diet, exercise, stress reduction and alcohol use.      - Fasting lipid profile check in 4 weeks order placed.  Instructed to fast for 8 to 12 hours prior to lab work.    - Cardiac rehab as scheduled for now.  Will discuss with Dr. Argueta and patricia if he is supposed to hold off on cardiac rehab.low up with patient will work with medical records to obtain his most recent [facility] visit.    -Patient was instructed to avoid strenuous activity, high intense exercise or lifting any heavy objects.    - We discussed a diet low in saturated fat, weight loss, and exercise along with medication for better control of cholesterol.  Highly encouraged to participate in nutrition class in cardiac rehab.    - Continue current hypertension regimen    - We discussed the importance of tightly controlled blood sugars to minimize risk of worsening coronary artery disease. Defer to PCP for diabetes managment.  Instructed patient to discuss with PCP regarding referral to diabetes educator since he had several questions about diabetes management and possible complications.    Follow up with Dr. Argueta as scheduled on 3/1/22.  Follow-up with PCP as scheduled.     History of Present Illness/Subjective    Mr. Koko Ronquillo is a 76 year old male with a past medical history of coronary artery disease with status post PCI to RCA in the past, abdominal aortic aneurysm without rupture with status post endoluminal graft in December 2019, pure hypercholesterolemia, hypertension, obesity, obstructive sleep apnea on CPAP, diabetes mellitus  type 2, and paroxysmal atrial fibrillation who is seen at [facility] for post coronary intervention follow up.     Patient was recently hospitalized with non-STEMI.  Coronary angiogram showed severe lesion in mid LAD and distal RCA which were successfully treated with drug-eluting stents.  Patient was switched from pravastatin to atorvastatin.  Echocardiogram showed preserved LVEF with no obvious valve abnormalities noted.    Today, Koko reports that his chest pain/heartburn resolved since recent stent placement.  He denies fatigue, lightheadedness, shortness of breath, dyspnea on exertion, orthopnea, PND, palpitations, chest pain, abdominal fullness/bloating and lower extremity edema.  He states that he went to the [facility] yesterday to have stent as he was told that there is a leak from his aortic aneurysm.  However, he was recommended to wait for another 3 to 4 months because of his recent cardiac stent placement.  He is wondering if he is safe to have cardiac rehab.  He is starting cardiac rehab next Monday.  He is also establishing care with a new primary care provider.  He has several questions about diabetes management.    Coronary Angiogram 1/3/2022: reviewed:  Conclusion    NSTEMI in an obese diabetic with hypertension, hyperlipidemia, and CAD, s/p PCI of the RCA with rotational atherectomy in 2019 (images unavailable).    Diffuse, severe coronary calcification.    Minimal left main disease.    Mild proximal and mid LAD disease with a focal, severe mid LAD stenosis which was stented with a Synergy RANCHO with good angiographic results. There is a severe ostial lesion in a small mid diagonal. The distal LAD is diffusely small.    Large circumflex with mild proximal disease and moderate distal circumflex prior to the bifurcation of the large OM-3 and OM-4, both of which have moderate stenoses.    Large RCA is diffusely and severely calcified. The previous stents cover  the proximal to distal RCA and there is a focal segment of severe restenosis in the distal RCA. PCI was difficult due to vessel angulation and calcification despite using an AL-1 guide and a Guideliner support catheter. See notes below. Successful PCI with a Synergy RANCHO with good angiographic results. Residual moderate distal RCA disease.    LV EDP elevated at 22 mmHg.     Recommendations  General Recommendations:  - Follow up visit with Nurse Practitioner in 1-2 weeks.   - Recommended follow up with doctor Dr. Argueta in 4-6 weeks.   - The patient will be observed in telemetry overnight and if stable, the patient will be allowed to return home tomorrow.   - Arterial sheath removed from radial artery with TR Band placement.   - Recommend cardiac rehabilitation.     Medications:  - Continue dual antiplatelet therapy for 12 month(s).   - Continue high dose statin therapy indefinitely.   - Risk factor management for atherosclerosis.     ECHO done on 1/3/2022-Reviewed:   Interpretation Summary     Technically challenging study.Definity contrast utlilized.  The left ventricle is normal in size.  There is mild concentric left ventricular hypertrophy.  Left ventricular systolic function is normal.  The visual ejection fraction is 55-60%.  Normal left ventricular wall motion  The right ventricle is not well visualized.  TAPSE is normal, which is consistent with normal right ventricular systolic  function.  The left atrium is not well visualized.  Right atrium not well visualized.  Valve structures are not optimally visualized.No obvious significant valve  abnormality observed  TDS-Definity used (no complications)  The aortic root measures mildly enlarged       Physical Examination Review of Systems   There were no vitals taken for this visit.  There is no height or weight on file to calculate BMI.  Wt Readings from Last 3 Encounters:   01/04/22 104.7 kg (230 lb 12.8 oz)   01/03/22 106.3 kg (234 lb 6.4 oz)   03/29/21 105.2  kg (232 lb)     General Appearance:   no distress, normal body habitus   ENT/Mouth: membranes moist, no oral lesions or bleeding gums.      EYES:  no scleral icterus, normal conjunctivae   Neck: no carotid bruits or thyromegaly   Chest/Lungs:   lungs are clear to auscultation, no rales or wheezing, equal chest wall expansion    Cardiovascular:    Heart rate regular. Normal first and second heart sounds with no murmurs, rubs, or gallops; the carotid, radial and posterior tibial pulses are intact, Jugular venous pressure flat, no edema bilaterally    Abdomen:  no organomegaly, masses, bruits, or tenderness; bowel sounds are present   Extremities  Puncture Site: no cyanosis or clubbing  Right radial site is soft with no bruising.  Radial pulses and Pedal pulses intact and symmetrical.  CMS intact.   Skin: no xanthelasma, warm.    Neurologic: normal  bilateral, no tremors     Psychiatric: alert and oriented x3, calm                Negative unless noted in HPI     Medical History  Surgical History Family History Social History   Past Medical History:   Diagnosis Date     Anxiety      Atrial fibrillation (H)      Chest pain made worse by breathing 4/21/2016     CIS (carcinoma in situ of bladder) 8/17/2017     Fatigue 5/19/2016     Hematuria 3/1/2017     HLD (hyperlipidemia)      HTN (hypertension)      Psoriasis      RA (rheumatoid arthritis) (H)      Sleep apnea     does not use CPAP    Past Surgical History:   Procedure Laterality Date     ABDOMINAL AORTIC ANEURYSM REPAIR  1995     CV CORONARY ANGIOGRAM N/A 2/8/2019    Procedure: Coronary Angiogram;  Surgeon: El De Oliveira MD;  Location: [facility] Cath Lab;  Service: Cardiology     CV CORONARY ANGIOGRAM N/A 1/3/2022    Procedure: CV CORONARY ANGIOGRAM;  Surgeon: Hailee Pierce MD;  Location: [facility] CATH LAB CV     CV LEFT HEART CATH N/A 1/3/2022    Procedure: Left Heart Cath;  Surgeon: Hailee Pierce MD;  Location: [facility] CATH LAB CV     CV PCI N/A  1/3/2022    Procedure: Percutaneous Coronary Intervention;  Surgeon: Hailee Pierce MD;  Location: [facility] LAB CV     CYSTOSCOPY, TRANSURETHRAL RESECTION (TUR) TUMOR BLADDER, COMBINED N/A 3/1/2017    Procedure: CYSTOSCOPY BLADDER BIOPSY AND FULGURATION;  Surgeon: Kostas Smith MD;  Location: [facility] OR;  Service:      HERNIA REPAIR      with nesh     IR MISCELLANEOUS PROCEDURE  2007     IR THORACIC AORTOGRAM  2007     OTHER SURGICAL HISTORY      aortic bypass     OTHER SURGICAL HISTORY      nasal cauterization    Family History   Problem Relation Age of Onset     Acute Myocardial Infarction Father 58.00    Social History     Socioeconomic History     Marital status:      Spouse name: Not on file     Number of children: Not on file     Years of education: Not on file     Highest education level: Not on file   Occupational History     Not on file   Tobacco Use     Smoking status: Former Smoker     Quit date: 2007     Years since quittin.9     Smokeless tobacco: Never Used   Substance and Sexual Activity     Alcohol use: Yes     Comment: Alcoholic Drinks/day: occasional     Drug use: No     Sexual activity: Not on file   Other Topics Concern     Not on file   Social History Narrative     Not on file     Social Determinants of Health     Financial Resource Strain: Not on file   Food Insecurity: Not on file   Transportation Needs: Not on file   Physical Activity: Not on file   Stress: Not on file   Social Connections: Not on file   Intimate Partner Violence: Not on file   Housing Stability: Not on file          Medications  Allergies   Current Outpatient Medications   Medication Sig Dispense Refill     albuterol (PROAIR HFA;PROVENTIL HFA;VENTOLIN HFA) 90 mcg/actuation inhaler [ALBUTEROL (PROAIR HFA;PROVENTIL HFA;VENTOLIN HFA) 90 MCG/ACTUATION INHALER] Inhale 1 puff daily as needed.       aspirin (ASA) 81 MG chewable tablet Take 1 tablet (81 mg) by mouth daily Starting  "tomorrow. 30 tablet 3     atorvastatin (LIPITOR) 80 MG tablet Take 1 tablet (80 mg) by mouth daily 90 tablet 3     blood glucose test strips [BLOOD GLUCOSE TEST STRIPS] Use 1 each As Directed 2 (two) times a day. Dispense brand per patient's insurance at pharmacy discretion. 180 strip 1     clopidogrel (PLAVIX) 75 MG tablet Take 1 tablet (75 mg) by mouth daily Dose to start tomorrow. 90 tablet 3     diltiazem ER COATED BEADS (CARDIZEM CD/CARTIA XT) 120 MG 24 hr capsule Take 1 capsule (120 mg) by mouth daily 90 capsule 1     folic acid (FOLVITE) 1 MG tablet Take 2 mg by mouth daily Do not take on methotrexate days (Tuesday)  4     generic lancets [GENERIC LANCETS] Use 1 each As Directed 4 (four) times a day. 100 each 6     hydrochlorothiazide (HYDRODIURIL) 12.5 MG tablet Take 1 tablet (12.5 mg) by mouth daily 90 tablet 1     losartan (COZAAR) 100 MG tablet Take 1 tablet (100 mg) by mouth daily 90 tablet 1     metFORMIN (GLUCOPHAGE) 1000 MG tablet Take 1,000 mg by mouth daily (with dinner)       metFORMIN (GLUCOPHAGE) 500 MG tablet [METFORMIN (GLUCOPHAGE) 500 MG TABLET] TAKE 1 TABLET BY MOUTH EVERY MORNING AND 2 TABLETS AT NIGHT. (Patient taking differently: Take 500 mg by mouth daily before breakfast ) 270 tablet 3     methotrexate 2.5 MG tablet Take 12.5 mg by mouth once a week Tuesdays       metoprolol succinate (TOPROL-XL) 100 MG 24 hr tablet [METOPROLOL SUCCINATE (TOPROL-XL) 100 MG 24 HR TABLET] TAKE 1 TABLET(100 MG) BY MOUTH DAILY 90 tablet 2     nitroGLYcerin (NITROSTAT) 0.4 MG sublingual tablet One tablet under the tongue every 5 minutes if needed for chest pain. May repeat every 5 minutes for a maximum of 3 doses in 15 minutes\" 25 tablet 3     tamsulosin (FLOMAX) 0.4 mg cap [TAMSULOSIN (FLOMAX) 0.4 MG CAP] Take 1 capsule (0.4 mg total) by mouth 2 (two) times a day. 180 capsule 0    No Known Allergies      Lab Results    Chemistry/lipid CBC Cardiac Enzymes/BNP/TSH/INR   Lab Results   Component Value Date    " CHOL 132 01/02/2022    HDL 27 (L) 01/02/2022    TRIG 241 (H) 01/02/2022    BUN 15 01/04/2022     01/04/2022    CO2 23 01/04/2022    Lab Results   Component Value Date    WBC 7.0 01/04/2022    HGB 12.8 (L) 01/04/2022    HCT 37.0 (L) 01/04/2022    MCV 93 01/04/2022     01/04/2022    Lab Results   Component Value Date    TROPONINI 0.53 (HH) 01/04/2022    INR 0.95 01/03/2022        50  minutes spent on the date of encounter doing chart review, review of outside records, review of test results, interpretation with above tests, patient visit, documentation and discussion with other provider.        This note has been dictated using voice recognition software. Any grammatical, typographical, or context distortions are unintentional and inherent to the software

## 2022-01-21 NOTE — PATIENT INSTRUCTIONS
Koko Ronquillo,    It was a pleasure to see you today at the [facility].     My recommendations after this visit include:    - No medications changes made today    - Please seek medical attention if you develop recurrent chest pain or shortness of breath or similar symptoms you experienced prior to recent cardiac event    -Schedule fasting lipid check in 4 weeks. Make sure to fast for 8-12 hours before blood work. It okay to have plain water, coffee or tea without sugar, creamer or added flavor     - Cardiac rehab as scheduled    - Follow up with Dr. Argueta as scheduled in March    - Please call [phone], if you have any questions or concerns    Edu Schmitz CNP    Medication     o Take all your medications as prescribed  o Do not stop any medications without talking with a healthcare provider    Exercise      o Physical activity is important for overall health  o Set a goal of 150 minutes of exercise each week  o For example, 30 minutes of exercise 5 days each week.    o These 30 minutes can be broken into shorter periods of 15 minutes twice daily or 10 minutes three times daily  o Start any exercise program slowly and work towards the goal of 150 minutes each week  o For example, you may start with 10 minutes and plan to add a few minutes each week as you get stronger   o Examples of exercise include walking, swimming, or biking  o Remember to stretch and stay hydrated with exercise    Diet     o A heart healthy diet includes:  o A variety of fruits and vegetables  o Whole grains  o Low-fat dairy (fat-free, 1% fat, and low-fat)  o Lean meats and poultry without skin   o Fish (eat fish 2 times each week)  o Nuts  o Limit saturated fat to about 13 grams each day (based on a 2000 calorie diet)  o Limit red meat  o Limit sugars (sweets and sugary beverages)  o Limit your portion sizes  o Do not add salt to your food when cooking or at the table  o Limit alcohol intake (no more than 1  drink each day for women or 2 drinks each day for men)    Weight Loss     o Work on losing weight with diet and exercise  o You BMI (body mass index) should be between 18.5-24.9  o This is a calculation of your weight and height  o Please ask your healthcare provider for your BMI    Manage Other Chronic Health Conditions     o Control cholesterol  o Eat a diet low in saturated fat  o Exercise   o Take a statin medication as prescribed  o Manage blood pressure  o Eat a diet low in sodium  o Exercise  o Reduce stress  o Lose weight   o Take blood pressure medications as prescribed  o Control blood sugars if diabetic  o Monitor sugars and carbohydrates in your diet  o Lose weight   o Take diabetes medications as prescribed  o Follow-up with your primary care provider to make sure your blood sugars are well controlled    Stress Reduction     o Find time each day to relax  o Reading, listening to music, yoga, meditation, exercise, spending time with friends and family, volunteering   o Get 6-8 hours of sleep each night    Smoking Cessation     o Smoking causes numerous health problems including coronary artery disease  o It is never too late to quit  o Set realistic goals for quitting  o Decrease the number of cigarettes used each week  o Use nicotine gum or patches to help you quit    Information from the American Heart Association.  Please visit their website at www.heart.org    Patient Education     Eating Heart-Healthy Foods  Eating has a big impact on your heart health. In fact, eating healthier can improve several of your heart risks at once. For instance, it helps you manage weight, cholesterol, and blood pressure. Here are ideas to help you make heart-healthy changes without giving up all the foods and flavors you love.   Getting started    Talk with your healthcare provider about eating plans, such as the DASH or Mediterranean diet. You may also be referred to a dietitian.    Change a few things at a time. Give  yourself time to get used to a few eating changes before adding more.    Work to create a tasty, healthy eating plan that you can stick to for the rest of your life.    Goals for healthy eating  Below are some tips to improve your eating habits:     Limit saturated fats and trans fats. Saturated fats raise your levels of cholesterol, so keep these fats to a minimum. They are found in foods such as fatty meats, whole milk, cheese, and palm and coconut oils. Avoid trans fats because they lower good cholesterol as well as raise bad cholesterol. Trans fats are most often found in processed foods, such as pastries, cookies, pies, muffins, fried foods, stick margarines, and shortening.    Reduce how much sodium (salt) you have. Eating too much salt may increase your blood pressure. Limit your sodium intake to 2,300 milligrams (mg) per day (the amount in 1 teaspoon of salt), or less if your healthcare provider recommends it. Dining out less often and eating fewer processed foods are two great ways to decrease the amount of salt you consume. At home, flavor your foods with other spices and herbs instead of salt.    Managing calories. A calorie is a unit of energy. Your body burns calories for fuel, but if you eat more calories than your body burns, the extras are stored as fat. Your healthcare provider can help you create a diet plan to manage your calories. This will likely include eating healthier foods and getting regular exercise. To help you track your progress, keep a diary to record what you eat and how often you exercise.  Choose the right foods  Aim to make these foods staples of your diet. If you have diabetes, you may have different recommendations than what is listed here:     Fruits and vegetables provide plenty of nutrients without a lot of calories. At meals, fill half your plate with these foods. Choose between fresh, frozen, canned, or dried without added sauces, salt, or sugars. Split the other half of  your plate between whole grains and lean protein.    Whole grains are high in fiber and rich in vitamins and nutrients. Good choices include whole wheat bread, pasta, oats, and brown rice. Make at least half of your grains whole grains.    Lean proteins give you nutrition with less fat. Good choices include fish, skinless chicken and turkey, and beans. Draining the fat from cooked ground meat is another way to reduce the amount of fat you eat.    Low-fat and nonfat dairy provide nutrients without a lot of fat. Try low-fat or nonfat milk, cheese, or yogurt.    Healthy fats can be good for you in small amounts. These are unsaturated fats, such as olive oil, nuts, and fish. Try to have at least 2 servings per week of fatty fish, such as salmon, sardines, mackerel, rainbow trout, and albacore tuna. These contain omega-3 fatty acids, which are good for your heart. Flaxseed and walnuts are other sources of heart-healthy fats.  More on heart-healthy eating  Read food labels  Healthy eating starts at the grocery store. Be sure to pay attention to food labels on packaged foods. Look for products that are high in fiber and protein, and low in saturated fat, added sugars, and sodium. Avoid products that contain trans fat. And pay close attention to serving size. For instance, if you plan to eat two servings, double all the numbers on the label.   Prepare food right  A key part of healthy cooking is cutting down on added fat, sugar and salt. Look on the internet for lower-fat, lower-sodium recipes without a lot of added sugars. Also try these tips:     Remove fat from meat and skin from poultry before cooking.    Skim fat from the surface of soups and sauces.    Broil, roast, boil, bake, steam, grill, or microwave food without added fats.    Choose ingredients that spice up your food without adding calories, fat, sugar, or sodium. Try these items: horseradish, hot sauce, lemon, mustard, nonfat salad dressings, and vinegar.  Small amounts of olive oil-based vinaigrettes are OK, too. For salt-free herbs and spices, try basil, cilantro, cinnamon, cumin, paprika, pepper, and rosemary.  [employer] last reviewed this educational content on 7/1/2020 2000-2021 [employer]. All rights reserved. This information is not intended as a substitute for professional medical care. Always follow your healthcare professional's instructions.

## 2022-01-29 ENCOUNTER — HEALTH MAINTENANCE LETTER (OUTPATIENT)
Age: 77
End: 2022-01-29

## 2022-02-21 ENCOUNTER — LAB (OUTPATIENT)
Dept: CARDIOLOGY | Facility: CLINIC | Age: 77
End: 2022-02-21
Payer: COMMERCIAL

## 2022-02-21 DIAGNOSIS — E78.2 MIXED HYPERLIPIDEMIA: ICD-10-CM

## 2022-02-21 LAB
CHOLEST SERPL-MCNC: 120 MG/DL
FASTING STATUS PATIENT QL REPORTED: NO
HDLC SERPL-MCNC: 27 MG/DL
LDLC SERPL CALC-MCNC: 27 MG/DL
TRIGL SERPL-MCNC: 331 MG/DL

## 2022-02-21 PROCEDURE — 80061 LIPID PANEL: CPT

## 2022-02-21 PROCEDURE — 36415 COLL VENOUS BLD VENIPUNCTURE: CPT

## 2022-03-01 ENCOUNTER — OFFICE VISIT (OUTPATIENT)
Dept: CARDIOLOGY | Facility: CLINIC | Age: 77
End: 2022-03-01
Payer: COMMERCIAL

## 2022-03-01 VITALS
WEIGHT: 234 LBS | BODY MASS INDEX: 33.58 KG/M2 | HEART RATE: 65 BPM | SYSTOLIC BLOOD PRESSURE: 132 MMHG | OXYGEN SATURATION: 97 % | DIASTOLIC BLOOD PRESSURE: 60 MMHG | RESPIRATION RATE: 18 BRPM

## 2022-03-01 DIAGNOSIS — M05.731 RHEUMATOID ARTHRITIS INVOLVING BOTH WRISTS WITH POSITIVE RHEUMATOID FACTOR (H): ICD-10-CM

## 2022-03-01 DIAGNOSIS — I25.10 CORONARY ARTERY DISEASE DUE TO LIPID RICH PLAQUE: Primary | ICD-10-CM

## 2022-03-01 DIAGNOSIS — M05.732 RHEUMATOID ARTHRITIS INVOLVING BOTH WRISTS WITH POSITIVE RHEUMATOID FACTOR (H): ICD-10-CM

## 2022-03-01 DIAGNOSIS — E11.9 TYPE 2 DIABETES MELLITUS TREATED WITHOUT INSULIN (H): ICD-10-CM

## 2022-03-01 DIAGNOSIS — I25.83 CORONARY ARTERY DISEASE DUE TO LIPID RICH PLAQUE: Primary | ICD-10-CM

## 2022-03-01 DIAGNOSIS — E78.00 PURE HYPERCHOLESTEROLEMIA: ICD-10-CM

## 2022-03-01 DIAGNOSIS — I48.0 PAROXYSMAL ATRIAL FIBRILLATION (H): ICD-10-CM

## 2022-03-01 DIAGNOSIS — I10 ESSENTIAL HYPERTENSION: ICD-10-CM

## 2022-03-01 DIAGNOSIS — I71.40 ABDOMINAL AORTIC ANEURYSM (AAA) WITHOUT RUPTURE (H): ICD-10-CM

## 2022-03-01 PROBLEM — Z98.61 PERCUTANEOUS TRANSLUMINAL CORONARY ANGIOPLASTY STATUS: Status: RESOLVED | Noted: 2022-01-03 | Resolved: 2022-03-01

## 2022-03-01 PROCEDURE — 99214 OFFICE O/P EST MOD 30 MIN: CPT | Performed by: INTERNAL MEDICINE

## 2022-03-01 NOTE — PATIENT INSTRUCTIONS
Mr Koko Ronquillo,  I enjoyed visiting with you again today.  I am glad to hear you are doing well.  Per our conversation in anticipation for a stent in aorta let us get the stress test without exercise.  I will plan on seeing you 1 year or sooner if needed.  Ajay Argueta

## 2022-03-01 NOTE — PROGRESS NOTES
[facility]  Heart Care Clinic Follow-up Note    Assessment & Plan        (I25.10,  I25.83) Coronary artery disease due to lipid rich plaque  (primary encounter diagnosis)  Comment: Due to borderline abnormal troponin in 2019 is 0.35 and another abnormal troponin December 31, 2022 of 0.33 he underwent angiography.  January 3, 2022 angiography showed left main 10% stenosis, proximal LAD 25% followed by mid 25% followed by 90% mid lesion that received a drug-coated stent as well as a second diagonal 90% stenosis.  The circumflex had a proximal 20% lesion, mid 40% lesion, with a third obtuse marginal artery 50% stenosis in the fourth obtuse marginal artery 50% stenosis.  Right coronary artery had a proximal 10% lesion, there is a patent stent, distal right coronary artery 80% stenosis which was intervened  with drug-coated stent as well as a distal 50% lesion.  Given that he is looking at endoluminal graft we will arrange for repeat pharmacological stress nuclear, this is preop.    (I71.4) Abdominal aortic aneurysm (AAA) without rupture (H)  Comment: He is noted to have an endoluminal graft leak of the right renal artery portion of the abdominal aortic graft.  [facility] is anticipating reintervention on this around April 2022, in anticipation this will arrange for pharmacological stress nuclear.    (I48.0) Paroxysmal atrial fibrillation (H)  Comment: Paroxysmal, possibly due to sleep apnea, no recent episodes since before 2016.  Currently not on anticoagulation given this, repeat event monitor 2021 showed no major arrhythmia, and did have one episode lasting maybe 5 minutes of palpitations, this is maybe once a month.  Hold off on further evaluation just yet.  And if recurrent atrial fibrillation seen start Eliquis 5 mg p.o. twice daily given his CHADS 2 VASC score of 3.     (E78.00) Pure hypercholesterolemia  Comment: Now with a cholesterol 120 and LDL of 27 on atorvastatin with triglycerides of 331 and  will work on diet.    (E11.9) Type 2 diabetes mellitus treated without insulin (H)  Comment: On metformin with hemoglobin A1c of 5.8 and needs to work on weight loss.    (I10) Hypertension  Comment: Under good control on losartan, metoprolol, HCTZ, diltiazem extended release, as well as Flomax.    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    Plan  1.  Work on diet for triglycerides and weight loss.  2.  Pharmacological stress nuclear and if significantly abnormal will need repeat angiography.  3.  Follow-up with me around December which will be a year out from his second acute coronary syndrome.  4.  Proceed with endoluminal graft repair at [facility].    Subjective  CC: 76-year-old white gentleman here for follow-up.  Since I saw him he has had endoluminal graft leak of his abdominal aortic aneurysm, and has had another acute coronary syndrome with troponin of 0.33.  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  No chest discomfort, palpitations, PND, therapy, syncope, dizziness or peripheral edema.    Medications  Current Outpatient Medications   Medication Sig Dispense Refill     albuterol (PROAIR HFA;PROVENTIL HFA;VENTOLIN HFA) 90 mcg/actuation inhaler [ALBUTEROL (PROAIR HFA;PROVENTIL HFA;VENTOLIN HFA) 90 MCG/ACTUATION INHALER] Inhale 1 puff daily as needed.       aspirin (ASA) 81 MG chewable tablet Take 1 tablet (81 mg) by mouth daily Starting tomorrow. 30 tablet 3     atorvastatin (LIPITOR) 80 MG tablet Take 1 tablet (80 mg) by mouth daily 90 tablet 3     blood glucose test strips [BLOOD GLUCOSE TEST STRIPS] Use 1 each As Directed 2 (two) times a day. Dispense brand per patient's insurance at pharmacy discretion. 180 strip 1     clopidogrel (PLAVIX) 75 MG tablet Take 1 tablet (75 mg) by mouth daily  "Dose to start tomorrow. 90 tablet 3     diltiazem ER COATED BEADS (CARDIZEM CD/CARTIA XT) 120 MG 24 hr capsule Take 1 capsule (120 mg) by mouth daily 90 capsule 1     folic acid (FOLVITE) 1 MG tablet Take 2 mg by mouth daily Do not take on methotrexate days (Tuesday)  4     generic lancets [GENERIC LANCETS] Use 1 each As Directed 4 (four) times a day. 100 each 6     hydrochlorothiazide (HYDRODIURIL) 12.5 MG tablet TAKE 1 TABLET(12.5 MG) BY MOUTH DAILY 90 tablet 1     losartan (COZAAR) 100 MG tablet TAKE 1 TABLET(100 MG) BY MOUTH DAILY 90 tablet 1     metFORMIN (GLUCOPHAGE) 500 MG tablet [METFORMIN (GLUCOPHAGE) 500 MG TABLET] TAKE 1 TABLET BY MOUTH EVERY MORNING AND 2 TABLETS AT NIGHT. 270 tablet 3     methotrexate 2.5 MG tablet Take 10 mg by mouth once a week Tuesdays       metoprolol succinate (TOPROL-XL) 100 MG 24 hr tablet [METOPROLOL SUCCINATE (TOPROL-XL) 100 MG 24 HR TABLET] TAKE 1 TABLET(100 MG) BY MOUTH DAILY 90 tablet 2     nitroGLYcerin (NITROSTAT) 0.4 MG sublingual tablet One tablet under the tongue every 5 minutes if needed for chest pain. May repeat every 5 minutes for a maximum of 3 doses in 15 minutes\" 25 tablet 3     tamsulosin (FLOMAX) 0.4 mg cap [TAMSULOSIN (FLOMAX) 0.4 MG CAP] Take 1 capsule (0.4 mg total) by mouth 2 (two) times a day. 180 capsule 0       Objective  /60 (BP Location: Left arm, Patient Position: Sitting, Cuff Size: Adult Large)   Pulse 65   Resp 18   Wt 106.1 kg (234 lb)   SpO2 97%   BMI 33.58 kg/m      General Appearance:    Alert, cooperative, no distress, appears stated age   Head:    Normocephalic, without obvious abnormality, atraumatic   Throat:   Lips, mucosa, and tongue normal; teeth and gums normal   Neck:   Supple, symmetrical, trachea midline, no adenopathy;        thyroid:  No enlargement/tenderness/nodules; no carotid    bruit or JVD   Back:     Symmetric, no curvature, ROM normal, no CVA tenderness   Lungs:     Clear to auscultation bilaterally, respirations " unlabored   Chest wall:    No tenderness or deformity   Heart:    Regular rate and rhythm, S1 and S2 normal, no murmur, rub   or gallop   Abdomen:     Soft, non-tender, bowel sounds active all four quadrants,     no masses, no organomegaly   Extremities:   Normal, atraumatic, no cyanosis or edema   Pulses:   2+ and symmetric all extremities   Skin:   Skin color, texture, turgor normal, no rashes or lesions     Results    Lab Results personally reviewed   Lab Results   Component Value Date    CHOL 120 02/21/2022    CHOL 132 01/02/2022     Lab Results   Component Value Date    HDL 27 (L) 02/21/2022    HDL 27 (L) 01/02/2022     No components found for: LDLCALC  Lab Results   Component Value Date    TRIG 331 (H) 02/21/2022    TRIG 241 (H) 01/02/2022     Lab Results   Component Value Date    WBC 7.0 01/04/2022    HGB 12.8 (L) 01/04/2022    HCT 37.0 (L) 01/04/2022     01/04/2022     Lab Results   Component Value Date    BUN 15 01/04/2022     01/04/2022    CO2 23 01/04/2022

## 2022-03-01 NOTE — LETTER
3/1/2022    Jelani Shabazz MD  [facility] [address]  [facility] [address]    RE: Koko Ronquillo       Dear Colleague,     [facility] the [facility].      [facility]  Heart Care Clinic Follow-up Note    Assessment & Plan        (I25.10,  I25.83) Coronary artery disease due to lipid rich plaque  (primary encounter diagnosis)  Comment: Due to borderline abnormal troponin in 2019 is 0.35 and another abnormal troponin December 31, 2022 of 0.33 he underwent angiography.  January 3, 2022 angiography showed left main 10% stenosis, proximal LAD 25% followed by mid 25% followed by 90% mid lesion that received a drug-coated stent as well as a second diagonal 90% stenosis.  The circumflex had a proximal 20% lesion, mid 40% lesion, with a third obtuse marginal artery 50% stenosis in the fourth obtuse marginal artery 50% stenosis.  Right coronary artery had a proximal 10% lesion, there is a patent stent, distal right coronary artery 80% stenosis which was intervened  with drug-coated stent as well as a distal 50% lesion.  Given that he is looking at endoluminal graft we will arrange for repeat pharmacological stress nuclear, this is preop.    (I71.4) Abdominal aortic aneurysm (AAA) without rupture (H)  Comment: He is noted to have an endoluminal graft leak of the right renal artery portion of the abdominal aortic graft.  [facility] is anticipating reintervention on this around April 2022, in anticipation this will arrange for pharmacological stress nuclear.    (I48.0) Paroxysmal atrial fibrillation (H)  Comment: Paroxysmal, possibly due to sleep apnea, no recent episodes since before 2016.  Currently not on anticoagulation given this, repeat event monitor 2021 showed no major arrhythmia, and did have one episode lasting maybe 5 minutes of palpitations, this is maybe once a month.  Hold off on further evaluation just yet.  And if recurrent  atrial fibrillation seen start Eliquis 5 mg p.o. twice daily given his CHADS 2 VASC score of 3.     (E78.00) Pure hypercholesterolemia  Comment: Now with a cholesterol 120 and LDL of 27 on atorvastatin with triglycerides of 331 and will work on diet.    (E11.9) Type 2 diabetes mellitus treated without insulin (H)  Comment: On metformin with hemoglobin A1c of 5.8 and needs to work on weight loss.    (I10) Hypertension  Comment: Under good control on losartan, metoprolol, HCTZ, diltiazem extended release, as well as Flomax.    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    Plan  1.  Work on diet for triglycerides and weight loss.  2.  Pharmacological stress nuclear and if significantly abnormal will need repeat angiography.  3.  Follow-up with me around December which will be a year out from his second acute coronary syndrome.  4.  Proceed with endoluminal graft repair at [facility].    Subjective  CC: 76-year-old white gentleman here for follow-up.  Since I saw him he has had endoluminal graft leak of his abdominal aortic aneurysm, and has had another acute coronary syndrome with troponin of 0.33.  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  No chest discomfort, palpitations, PND, therapy, syncope, dizziness or peripheral edema.    Medications  Current Outpatient Medications   Medication Sig Dispense Refill     albuterol (PROAIR HFA;PROVENTIL HFA;VENTOLIN HFA) 90 mcg/actuation inhaler [ALBUTEROL (PROAIR HFA;PROVENTIL HFA;VENTOLIN HFA) 90 MCG/ACTUATION INHALER] Inhale 1 puff daily as needed.       aspirin (ASA) 81 MG chewable tablet Take 1 tablet (81 mg) by mouth daily Starting tomorrow. 30 tablet 3     atorvastatin (LIPITOR) 80 MG tablet Take 1 tablet (80 mg) by mouth daily 90 tablet 3     blood glucose test  "strips [BLOOD GLUCOSE TEST STRIPS] Use 1 each As Directed 2 (two) times a day. Dispense brand per patient's insurance at pharmacy discretion. 180 strip 1     clopidogrel (PLAVIX) 75 MG tablet Take 1 tablet (75 mg) by mouth daily Dose to start tomorrow. 90 tablet 3     diltiazem ER COATED BEADS (CARDIZEM CD/CARTIA XT) 120 MG 24 hr capsule Take 1 capsule (120 mg) by mouth daily 90 capsule 1     folic acid (FOLVITE) 1 MG tablet Take 2 mg by mouth daily Do not take on methotrexate days (Tuesday)  4     generic lancets [GENERIC LANCETS] Use 1 each As Directed 4 (four) times a day. 100 each 6     hydrochlorothiazide (HYDRODIURIL) 12.5 MG tablet TAKE 1 TABLET(12.5 MG) BY MOUTH DAILY 90 tablet 1     losartan (COZAAR) 100 MG tablet TAKE 1 TABLET(100 MG) BY MOUTH DAILY 90 tablet 1     metFORMIN (GLUCOPHAGE) 500 MG tablet [METFORMIN (GLUCOPHAGE) 500 MG TABLET] TAKE 1 TABLET BY MOUTH EVERY MORNING AND 2 TABLETS AT NIGHT. 270 tablet 3     methotrexate 2.5 MG tablet Take 10 mg by mouth once a week Tuesdays       metoprolol succinate (TOPROL-XL) 100 MG 24 hr tablet [METOPROLOL SUCCINATE (TOPROL-XL) 100 MG 24 HR TABLET] TAKE 1 TABLET(100 MG) BY MOUTH DAILY 90 tablet 2     nitroGLYcerin (NITROSTAT) 0.4 MG sublingual tablet One tablet under the tongue every 5 minutes if needed for chest pain. May repeat every 5 minutes for a maximum of 3 doses in 15 minutes\" 25 tablet 3     tamsulosin (FLOMAX) 0.4 mg cap [TAMSULOSIN (FLOMAX) 0.4 MG CAP] Take 1 capsule (0.4 mg total) by mouth 2 (two) times a day. 180 capsule 0       Objective  /60 (BP Location: Left arm, Patient Position: Sitting, Cuff Size: Adult Large)   Pulse 65   Resp 18   Wt 106.1 kg (234 lb)   SpO2 97%   BMI 33.58 kg/m      General Appearance:    Alert, cooperative, no distress, appears stated age   Head:    Normocephalic, without obvious abnormality, atraumatic   Throat:   Lips, mucosa, and tongue normal; teeth and gums normal   Neck:   Supple, symmetrical, trachea " midline, no adenopathy;        thyroid:  No enlargement/tenderness/nodules; no carotid    bruit or JVD   Back:     Symmetric, no curvature, ROM normal, no CVA tenderness   Lungs:     Clear to auscultation bilaterally, respirations unlabored   Chest wall:    No tenderness or deformity   Heart:    Regular rate and rhythm, S1 and S2 normal, no murmur, rub   or gallop   Abdomen:     Soft, non-tender, bowel sounds active all four quadrants,     no masses, no organomegaly   Extremities:   Normal, atraumatic, no cyanosis or edema   Pulses:   2+ and symmetric all extremities   Skin:   Skin color, texture, turgor normal, no rashes or lesions     Results    Lab Results personally reviewed   Lab Results   Component Value Date    CHOL 120 02/21/2022    CHOL 132 01/02/2022     Lab Results   Component Value Date    HDL 27 (L) 02/21/2022    HDL 27 (L) 01/02/2022     No components found for: LDLCALC  Lab Results   Component Value Date    TRIG 331 (H) 02/21/2022    TRIG 241 (H) 01/02/2022     Lab Results   Component Value Date    WBC 7.0 01/04/2022    HGB 12.8 (L) 01/04/2022    HCT 37.0 (L) 01/04/2022     01/04/2022     Lab Results   Component Value Date    BUN 15 01/04/2022     01/04/2022    CO2 23 01/04/2022               Thank you for allowing me to participate in the care of your patient.      Sincerely,     PARVIZ ARGUETA MD     [facility]  cc:   Parviz Argueta MD  [address] [place], [place] [id number]

## 2022-03-15 ENCOUNTER — HOSPITAL ENCOUNTER (OUTPATIENT)
Dept: CARDIOLOGY | Facility: CLINIC | Age: 77
Discharge: HOME | End: 2022-03-15
Attending: INTERNAL MEDICINE
Payer: COMMERCIAL

## 2022-03-15 ENCOUNTER — HOSPITAL ENCOUNTER (OUTPATIENT)
Dept: NUCLEAR MEDICINE | Facility: CLINIC | Age: 77
Discharge: HOME | End: 2022-03-15
Attending: INTERNAL MEDICINE
Payer: COMMERCIAL

## 2022-03-15 DIAGNOSIS — I25.83 CORONARY ARTERY DISEASE DUE TO LIPID RICH PLAQUE: ICD-10-CM

## 2022-03-15 DIAGNOSIS — I25.10 CORONARY ARTERY DISEASE DUE TO LIPID RICH PLAQUE: ICD-10-CM

## 2022-03-15 DIAGNOSIS — I71.40 ABDOMINAL AORTIC ANEURYSM (AAA) WITHOUT RUPTURE (H): ICD-10-CM

## 2022-03-15 LAB
CV STRESS CURRENT BP HE: NORMAL
CV STRESS CURRENT HR HE: 100
CV STRESS CURRENT HR HE: 101
CV STRESS CURRENT HR HE: 101
CV STRESS CURRENT HR HE: 103
CV STRESS CURRENT HR HE: 106
CV STRESS CURRENT HR HE: 107
CV STRESS CURRENT HR HE: 107
CV STRESS CURRENT HR HE: 110
CV STRESS CURRENT HR HE: 82
CV STRESS CURRENT HR HE: 86
CV STRESS CURRENT HR HE: 87
CV STRESS CURRENT HR HE: 88
CV STRESS CURRENT HR HE: 97
CV STRESS CURRENT HR HE: 98
CV STRESS DEVIATION TIME HE: NORMAL
CV STRESS ECHO PERCENT HR HE: NORMAL
CV STRESS EXERCISE STAGE HE: NORMAL
CV STRESS FINAL RESTING BP HE: NORMAL
CV STRESS FINAL RESTING HR HE: 86
CV STRESS MAX HR HE: 110
CV STRESS MAX TREADMILL GRADE HE: 0
CV STRESS MAX TREADMILL SPEED HE: 0
CV STRESS PEAK DIA BP HE: NORMAL
CV STRESS PEAK SYS BP HE: NORMAL
CV STRESS PHASE HE: NORMAL
CV STRESS PROTOCOL HE: NORMAL
CV STRESS RESTING PT POSITION HE: NORMAL
CV STRESS ST DEVIATION AMOUNT HE: NORMAL
CV STRESS ST DEVIATION ELEVATION HE: NORMAL
CV STRESS ST EVELATION AMOUNT HE: NORMAL
CV STRESS TEST TYPE HE: NORMAL
CV STRESS TOTAL STAGE TIME MIN 1 HE: NORMAL
NUC STRESS EJECTION FRACTION: 62 %
RATE PRESSURE PRODUCT: NORMAL
STRESS ECHO BASELINE DIASTOLIC HE: 77
STRESS ECHO BASELINE HR: 88
STRESS ECHO BASELINE SYSTOLIC BP: 169
STRESS ECHO CALCULATED PERCENT HR: 77 %
STRESS ECHO LAST STRESS DIASTOLIC BP: 85
STRESS ECHO LAST STRESS HR: 107
STRESS ECHO LAST STRESS SYSTOLIC BP: 159
STRESS ECHO TARGET HR: 143

## 2022-03-15 PROCEDURE — A9500 TC99M SESTAMIBI: HCPCS | Performed by: INTERNAL MEDICINE

## 2022-03-15 PROCEDURE — 250N000011 HC RX IP 250 OP 636: Performed by: INTERNAL MEDICINE

## 2022-03-15 PROCEDURE — 78452 HT MUSCLE IMAGE SPECT MULT: CPT | Mod: 26 | Performed by: INTERNAL MEDICINE

## 2022-03-15 PROCEDURE — 343N000001 HC RX 343: Performed by: INTERNAL MEDICINE

## 2022-03-15 PROCEDURE — 78452 HT MUSCLE IMAGE SPECT MULT: CPT

## 2022-03-15 PROCEDURE — 93016 CV STRESS TEST SUPVJ ONLY: CPT | Performed by: INTERNAL MEDICINE

## 2022-03-15 PROCEDURE — 93018 CV STRESS TEST I&R ONLY: CPT | Performed by: INTERNAL MEDICINE

## 2022-03-15 PROCEDURE — 93017 CV STRESS TEST TRACING ONLY: CPT

## 2022-03-15 RX ORDER — AMINOPHYLLINE 25 MG/ML
50 INJECTION, SOLUTION INTRAVENOUS
Status: DISCONTINUED | OUTPATIENT
Start: 2022-03-15 | End: 2022-03-15 | Stop reason: HOSPADM

## 2022-03-15 RX ORDER — ALBUTEROL SULFATE 0.83 MG/ML
2.5 SOLUTION RESPIRATORY (INHALATION)
Status: DISCONTINUED | OUTPATIENT
Start: 2022-03-15 | End: 2022-03-15 | Stop reason: HOSPADM

## 2022-03-15 RX ORDER — REGADENOSON 0.08 MG/ML
0.4 INJECTION, SOLUTION INTRAVENOUS ONCE
Status: COMPLETED | OUTPATIENT
Start: 2022-03-15 | End: 2022-03-15

## 2022-03-15 RX ADMIN — Medication 8 MCI.: at 07:28

## 2022-03-15 RX ADMIN — Medication 36.3 MCI.: at 08:15

## 2022-03-15 RX ADMIN — REGADENOSON 0.4 MG: 0.08 INJECTION, SOLUTION INTRAVENOUS at 08:13

## 2022-04-19 ENCOUNTER — TELEPHONE (OUTPATIENT)
Dept: CARDIOLOGY | Facility: CLINIC | Age: 77
End: 2022-04-19
Payer: COMMERCIAL

## 2022-04-19 NOTE — TELEPHONE ENCOUNTER
Message  Received: Today  Ajay Argueta MD Caswell, Mallory J, RN  Caller: Unspecified (Today, 12:30 PM)  Seeing as he is 4 months out from his most recent stenting, if he needs to have his endograft repaired we can hold Plavix for 5 days.  Certainly would like to restart it the evening of procedure.  I would like him to stay on Plavix most likely indefinite given his numerous episodes of stenting.   LF                 ==Called over to [facility] vascular clinic regarding above message. Spoke with Briana and passed on message regarding Plavix hold. She verbalized understanding and will notify Dr. Loera and the patient. -[facility] [place]

## 2022-04-19 NOTE — TELEPHONE ENCOUNTER
----- Message from Patsy Lunsford sent at 4/19/2022 10:59 AM CDT -----  Regarding: [facility] PT  General phone call:    Caller: DR. EMERSON HERNADEZ   Primary cardiologist: [facility]   Detailed reason for call: DR IS HOPING TO PUT IN A RENAL STENT 4/25  - CAN THEY STOP HIS PLAVIX   Best phone number: [phone]  Best time to contact: ANY  Ok to leave a detailedmessage? YES  Device? NO    Additional Info:       Per [facility] note with Dr. Loera:      ASSESSMENT / PLAN     Mr. Ronquillo was seen today for surveillance. 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 They were provided with a contact details to get in touch with us if there is any change in his clinical scenario-abdominal or back pain. Given that the aneurysm is shrinking, it is unlikely that this endoleak is going to cause major sac pressurization and expansion leading to rupture in these 3 months, although this is a remote possibility. He was here today with his wife, they expressed understanding and were in agreement with the plan. We also discussed that he will discuss his antiplatelet plan with his cardiologist, we will contact him closer to the procedure to determine which medications he needs to hold. He was seen with Dr. Loera.    Electronically signed by Anju Loja M.B.B.S. at 01/19/2022 4:48 PM CST            ==Noted message from [facility] physician - he is due to have repair of right renal endoleak. This has been put off for 3 months due to recent cardiac intervention in January of this year- 1/3/22 PCI to LAD and RCA due to NSTEMI. Will [facility]. -[facility] [place]          Dr. Argueta,  See above- I know you saw Koko recently and he had a stable stress test and was told he could proceed with upcoming surgery at [place]. Ok for him to hold his Plavix for this? And if so, how many days are you comfortable with?  Thanks,  Mal

## 2022-07-16 ENCOUNTER — HEALTH MAINTENANCE LETTER (OUTPATIENT)
Age: 77
End: 2022-07-16

## 2022-08-18 DIAGNOSIS — I71.40 ABDOMINAL AORTIC ANEURYSM (AAA) WITHOUT RUPTURE (H): ICD-10-CM

## 2022-08-18 DIAGNOSIS — I10 ESSENTIAL HYPERTENSION: ICD-10-CM

## 2022-08-18 RX ORDER — LOSARTAN POTASSIUM 100 MG/1
TABLET ORAL
Qty: 90 TABLET | Refills: 1 | Status: SHIPPED | OUTPATIENT
Start: 2022-08-18 | End: 2023-02-15

## 2022-08-18 RX ORDER — HYDROCHLOROTHIAZIDE 12.5 MG/1
TABLET ORAL
Qty: 90 TABLET | Refills: 1 | Status: SHIPPED | OUTPATIENT
Start: 2022-08-18 | End: 2023-02-15

## 2022-09-18 ENCOUNTER — HEALTH MAINTENANCE LETTER (OUTPATIENT)
Age: 77
End: 2022-09-18

## 2022-12-17 ENCOUNTER — APPOINTMENT (OUTPATIENT)
Dept: RADIOLOGY | Facility: CLINIC | Age: 77
End: 2022-12-17
Attending: EMERGENCY MEDICINE
Payer: COMMERCIAL

## 2022-12-17 ENCOUNTER — HOSPITAL ENCOUNTER (OUTPATIENT)
Facility: CLINIC | Age: 77
Setting detail: OBSERVATION
Discharge: HOME | End: 2022-12-18
Attending: EMERGENCY MEDICINE | Admitting: EMERGENCY MEDICINE
Payer: COMMERCIAL

## 2022-12-17 DIAGNOSIS — I25.10 CORONARY ARTERY DISEASE DUE TO LIPID RICH PLAQUE: Primary | ICD-10-CM

## 2022-12-17 DIAGNOSIS — R07.9 CHEST PAIN, UNSPECIFIED TYPE: ICD-10-CM

## 2022-12-17 DIAGNOSIS — I25.83 CORONARY ARTERY DISEASE DUE TO LIPID RICH PLAQUE: Primary | ICD-10-CM

## 2022-12-17 LAB
BASOPHILS # BLD AUTO: 0.1 10E3/UL (ref 0–0.2)
BASOPHILS NFR BLD AUTO: 2 %
EOSINOPHIL # BLD AUTO: 0.2 10E3/UL (ref 0–0.7)
EOSINOPHIL NFR BLD AUTO: 3 %
ERYTHROCYTE [DISTWIDTH] IN BLOOD BY AUTOMATED COUNT: 13.9 % (ref 10–15)
HCT VFR BLD AUTO: 38.1 % (ref 40–53)
HGB BLD-MCNC: 12.8 G/DL (ref 13.3–17.7)
IMM GRANULOCYTES # BLD: 0 10E3/UL
IMM GRANULOCYTES NFR BLD: 0 %
LYMPHOCYTES # BLD AUTO: 2.3 10E3/UL (ref 0.8–5.3)
LYMPHOCYTES NFR BLD AUTO: 29 %
MCH RBC QN AUTO: 31.2 PG (ref 26.5–33)
MCHC RBC AUTO-ENTMCNC: 33.6 G/DL (ref 31.5–36.5)
MCV RBC AUTO: 93 FL (ref 78–100)
MONOCYTES # BLD AUTO: 0.7 10E3/UL (ref 0–1.3)
MONOCYTES NFR BLD AUTO: 8 %
NEUTROPHILS # BLD AUTO: 4.6 10E3/UL (ref 1.6–8.3)
NEUTROPHILS NFR BLD AUTO: 58 %
NRBC # BLD AUTO: 0 10E3/UL
NRBC BLD AUTO-RTO: 0 /100
PLATELET # BLD AUTO: 232 10E3/UL (ref 150–450)
RBC # BLD AUTO: 4.1 10E6/UL (ref 4.4–5.9)
WBC # BLD AUTO: 8 10E3/UL (ref 4–11)

## 2022-12-17 PROCEDURE — 93005 ELECTROCARDIOGRAM TRACING: CPT | Performed by: EMERGENCY MEDICINE

## 2022-12-17 PROCEDURE — 80053 COMPREHEN METABOLIC PANEL: CPT | Performed by: EMERGENCY MEDICINE

## 2022-12-17 PROCEDURE — 71046 X-RAY EXAM CHEST 2 VIEWS: CPT

## 2022-12-17 PROCEDURE — 99285 EMERGENCY DEPT VISIT HI MDM: CPT | Mod: 25

## 2022-12-17 PROCEDURE — 36415 COLL VENOUS BLD VENIPUNCTURE: CPT | Performed by: EMERGENCY MEDICINE

## 2022-12-17 PROCEDURE — 84484 ASSAY OF TROPONIN QUANT: CPT | Performed by: EMERGENCY MEDICINE

## 2022-12-17 PROCEDURE — 250N000013 HC RX MED GY IP 250 OP 250 PS 637: Performed by: EMERGENCY MEDICINE

## 2022-12-17 PROCEDURE — 85025 COMPLETE CBC W/AUTO DIFF WBC: CPT | Performed by: EMERGENCY MEDICINE

## 2022-12-17 RX ORDER — ASPIRIN 81 MG/1
81 TABLET, CHEWABLE ORAL ONCE
Status: COMPLETED | OUTPATIENT
Start: 2022-12-17 | End: 2022-12-17

## 2022-12-17 RX ADMIN — ASPIRIN 81 MG 81 MG: 81 TABLET ORAL at 23:38

## 2022-12-17 ASSESSMENT — ENCOUNTER SYMPTOMS
NAUSEA: 0
SHORTNESS OF BREATH: 1
VOMITING: 0
FATIGUE: 1

## 2022-12-17 ASSESSMENT — ACTIVITIES OF DAILY LIVING (ADL): ADLS_ACUITY_SCORE: 35

## 2022-12-18 VITALS
TEMPERATURE: 97.5 F | OXYGEN SATURATION: 95 % | HEIGHT: 70 IN | HEART RATE: 70 BPM | RESPIRATION RATE: 18 BRPM | BODY MASS INDEX: 33.64 KG/M2 | WEIGHT: 235 LBS | SYSTOLIC BLOOD PRESSURE: 117 MMHG | DIASTOLIC BLOOD PRESSURE: 70 MMHG

## 2022-12-18 LAB
ALBUMIN SERPL-MCNC: 3.7 G/DL (ref 3.5–5)
ALP SERPL-CCNC: 119 U/L (ref 45–120)
ALT SERPL W P-5'-P-CCNC: 35 U/L (ref 0–45)
ANION GAP SERPL CALCULATED.3IONS-SCNC: 12 MMOL/L (ref 5–18)
AST SERPL W P-5'-P-CCNC: 22 U/L (ref 0–40)
ATRIAL RATE - MUSE: 60 BPM
BILIRUB SERPL-MCNC: 0.5 MG/DL (ref 0–1)
BUN SERPL-MCNC: 16 MG/DL (ref 8–28)
CALCIUM SERPL-MCNC: 8.9 MG/DL (ref 8.5–10.5)
CHLORIDE BLD-SCNC: 106 MMOL/L (ref 98–107)
CO2 SERPL-SCNC: 22 MMOL/L (ref 22–31)
CREAT SERPL-MCNC: 1.11 MG/DL (ref 0.7–1.3)
DIASTOLIC BLOOD PRESSURE - MUSE: 80 MMHG
GFR SERPL CREATININE-BSD FRML MDRD: 68 ML/MIN/1.73M2
GLUCOSE BLD-MCNC: 143 MG/DL (ref 70–125)
HOLD SPECIMEN: NORMAL
INTERPRETATION ECG - MUSE: NORMAL
P AXIS - MUSE: 39 DEGREES
POTASSIUM BLD-SCNC: 3.7 MMOL/L (ref 3.5–5)
PR INTERVAL - MUSE: 184 MS
PROT SERPL-MCNC: 7.2 G/DL (ref 6–8)
QRS DURATION - MUSE: 84 MS
QT - MUSE: 448 MS
QTC - MUSE: 448 MS
R AXIS - MUSE: 4 DEGREES
SODIUM SERPL-SCNC: 140 MMOL/L (ref 136–145)
SYSTOLIC BLOOD PRESSURE - MUSE: 161 MMHG
T AXIS - MUSE: 17 DEGREES
TROPONIN I SERPL-MCNC: 0.02 NG/ML (ref 0–0.29)
TROPONIN I SERPL-MCNC: 0.02 NG/ML (ref 0–0.29)
TROPONIN I SERPL-MCNC: 0.04 NG/ML (ref 0–0.29)
VENTRICULAR RATE- MUSE: 60 BPM

## 2022-12-18 PROCEDURE — 99219 PR INITIAL OBSERVATION CARE,LEVEL II: CPT | Performed by: HOSPITALIST

## 2022-12-18 PROCEDURE — 36415 COLL VENOUS BLD VENIPUNCTURE: CPT | Performed by: HOSPITALIST

## 2022-12-18 PROCEDURE — 84484 ASSAY OF TROPONIN QUANT: CPT | Performed by: HOSPITALIST

## 2022-12-18 PROCEDURE — 250N000013 HC RX MED GY IP 250 OP 250 PS 637: Performed by: INTERNAL MEDICINE

## 2022-12-18 PROCEDURE — G0378 HOSPITAL OBSERVATION PER HR: HCPCS

## 2022-12-18 PROCEDURE — 99214 OFFICE O/P EST MOD 30 MIN: CPT | Performed by: INTERNAL MEDICINE

## 2022-12-18 RX ORDER — ATORVASTATIN CALCIUM 40 MG/1
80 TABLET, FILM COATED ORAL AT BEDTIME
Status: DISCONTINUED | OUTPATIENT
Start: 2022-12-18 | End: 2022-12-18 | Stop reason: HOSPADM

## 2022-12-18 RX ORDER — NITROGLYCERIN 0.4 MG/1
0.4 TABLET SUBLINGUAL EVERY 5 MIN PRN
Status: DISCONTINUED | OUTPATIENT
Start: 2022-12-18 | End: 2022-12-18 | Stop reason: HOSPADM

## 2022-12-18 RX ORDER — ALBUTEROL SULFATE 90 UG/1
2 AEROSOL, METERED RESPIRATORY (INHALATION) EVERY 4 HOURS PRN
Status: DISCONTINUED | OUTPATIENT
Start: 2022-12-18 | End: 2022-12-18 | Stop reason: HOSPADM

## 2022-12-18 RX ORDER — HYDROCHLOROTHIAZIDE 12.5 MG/1
12.5 TABLET ORAL DAILY
Status: DISCONTINUED | OUTPATIENT
Start: 2022-12-18 | End: 2022-12-18 | Stop reason: HOSPADM

## 2022-12-18 RX ORDER — ACETAMINOPHEN 325 MG/1
975 TABLET ORAL EVERY 8 HOURS PRN
Status: DISCONTINUED | OUTPATIENT
Start: 2022-12-18 | End: 2022-12-18 | Stop reason: HOSPADM

## 2022-12-18 RX ORDER — TAMSULOSIN HYDROCHLORIDE 0.4 MG/1
0.4 CAPSULE ORAL 2 TIMES DAILY
Status: DISCONTINUED | OUTPATIENT
Start: 2022-12-18 | End: 2022-12-18 | Stop reason: HOSPADM

## 2022-12-18 RX ORDER — ACETAMINOPHEN 325 MG/1
650 TABLET ORAL EVERY 4 HOURS PRN
Start: 2022-12-18 | End: 2024-02-05

## 2022-12-18 RX ORDER — CLOPIDOGREL BISULFATE 75 MG/1
75 TABLET ORAL DAILY
Status: DISCONTINUED | OUTPATIENT
Start: 2022-12-18 | End: 2022-12-18 | Stop reason: HOSPADM

## 2022-12-18 RX ORDER — METOPROLOL SUCCINATE 25 MG/1
100 TABLET, EXTENDED RELEASE ORAL DAILY
Status: DISCONTINUED | OUTPATIENT
Start: 2022-12-18 | End: 2022-12-18 | Stop reason: HOSPADM

## 2022-12-18 RX ORDER — MAGNESIUM HYDROXIDE/ALUMINUM HYDROXICE/SIMETHICONE 120; 1200; 1200 MG/30ML; MG/30ML; MG/30ML
30 SUSPENSION ORAL EVERY 4 HOURS PRN
Status: DISCONTINUED | OUTPATIENT
Start: 2022-12-18 | End: 2022-12-18 | Stop reason: HOSPADM

## 2022-12-18 RX ORDER — DILTIAZEM HYDROCHLORIDE 120 MG/1
120 CAPSULE, COATED, EXTENDED RELEASE ORAL DAILY
Status: DISCONTINUED | OUTPATIENT
Start: 2022-12-18 | End: 2022-12-18 | Stop reason: HOSPADM

## 2022-12-18 RX ORDER — ASPIRIN 81 MG/1
81 TABLET ORAL DAILY
Status: DISCONTINUED | OUTPATIENT
Start: 2022-12-19 | End: 2022-12-18 | Stop reason: HOSPADM

## 2022-12-18 RX ORDER — LOSARTAN POTASSIUM 25 MG/1
100 TABLET ORAL DAILY
Status: DISCONTINUED | OUTPATIENT
Start: 2022-12-18 | End: 2022-12-18 | Stop reason: HOSPADM

## 2022-12-18 RX ADMIN — DILTIAZEM HYDROCHLORIDE 120 MG: 120 CAPSULE, COATED, EXTENDED RELEASE ORAL at 10:08

## 2022-12-18 RX ADMIN — METOPROLOL SUCCINATE 100 MG: 25 TABLET, EXTENDED RELEASE ORAL at 09:58

## 2022-12-18 RX ADMIN — HYDROCHLOROTHIAZIDE 12.5 MG: 12.5 TABLET ORAL at 10:08

## 2022-12-18 RX ADMIN — LOSARTAN POTASSIUM 100 MG: 25 TABLET, FILM COATED ORAL at 10:01

## 2022-12-18 RX ADMIN — CLOPIDOGREL BISULFATE 75 MG: 75 TABLET ORAL at 10:00

## 2022-12-18 RX ADMIN — TAMSULOSIN HYDROCHLORIDE 0.4 MG: 0.4 CAPSULE ORAL at 10:10

## 2022-12-18 ASSESSMENT — ACTIVITIES OF DAILY LIVING (ADL)
ADLS_ACUITY_SCORE: 35
DEPENDENT_IADLS:: INDEPENDENT
ADLS_ACUITY_SCORE: 35

## 2022-12-18 NOTE — DISCHARGE INSTRUCTIONS
Heart Care will call to schedule follow up stress test.    No medication changes.    Use nitroglycerin as directed as you did prior to arrival.

## 2022-12-18 NOTE — PHARMACY-ADMISSION MEDICATION HISTORY
"Pharmacy Note - Admission Medication History    Pertinent Provider Information:     ______________________________________________________________________    Prior To Admission (PTA) med list completed and updated in EMR.       PTA Med List   Medication Sig Last Dose     albuterol (PROAIR HFA;PROVENTIL HFA;VENTOLIN HFA) 90 mcg/actuation inhaler Inhale 2 puffs into the lungs every 4 hours as needed rare use     atorvastatin (LIPITOR) 80 MG tablet Take 1 tablet (80 mg) by mouth daily 12/16/2022 at PM     clopidogrel (PLAVIX) 75 MG tablet Take 1 tablet (75 mg) by mouth daily Dose to start tomorrow. 12/17/2022 at AM     diltiazem ER COATED BEADS (CARDIZEM CD/CARTIA XT) 120 MG 24 hr capsule TAKE 1 CAPSULE(120 MG) BY MOUTH DAILY 12/17/2022 at AM     folic acid (FOLVITE) 1 MG tablet Take 1 mg by mouth daily Do not take on methotrexate days (Tuesday) 12/17/2022 at AM     hydrochlorothiazide (HYDRODIURIL) 12.5 MG tablet TAKE 1 TABLET(12.5 MG) BY MOUTH DAILY 12/17/2022 at Am     losartan (COZAAR) 100 MG tablet TAKE 1 TABLET(100 MG) BY MOUTH DAILY 12/17/2022 at AM     methotrexate 2.5 MG tablet Take 12.5 mg by mouth once a week Tuesdays 12/13/2022     metoprolol succinate (TOPROL-XL) 100 MG 24 hr tablet [METOPROLOL SUCCINATE (TOPROL-XL) 100 MG 24 HR TABLET] TAKE 1 TABLET(100 MG) BY MOUTH DAILY 12/17/2022 at AM     nitroGLYcerin (NITROSTAT) 0.4 MG sublingual tablet One tablet under the tongue every 5 minutes if needed for chest pain. May repeat every 5 minutes for a maximum of 3 doses in 15 minutes\"      tamsulosin (FLOMAX) 0.4 mg cap [TAMSULOSIN (FLOMAX) 0.4 MG CAP] Take 1 capsule (0.4 mg total) by mouth 2 (two) times a day. 12/17/2022       Information source(s): Patient and CareEverywhere/SureScripts  Method of interview communication: in-person    Summary of Changes to PTA Med List  New: none  Discontinued: aspirin, metformin  Changed: folic acid, methotrexate    Patient was asked about OTC/herbal products specifically.  " PTA med list reflects this.    In the past week, patient estimated taking medication this percent of the time:  greater than 90%.    Allergies were reviewed, assessed, and updated with the patient.      Patient does not anticipate needing any multi-use medications during admission.    The information provided in this note is only as accurate as the sources available at the time of the update(s).    Thank you for the opportunity to participate in the care of this patient.    Tomasa Fong RPH  12/18/2022 7:37 AM

## 2022-12-18 NOTE — CONSULTS
[facility]  [phone]      Assessment/Recommendations   Patient with known coronary artery disease as, status post multiple stents placed in January 2022.  Prior to that he had symptoms which felt like heartburn.  Last night he had recurrent symptoms that felt like heartburn.  He initially thought that he would take Rolaids but then decided to take a nitroglycerin.  The discomfort gradually went away in 15 minutes while he was on his way to the emergency room.  He did not have shortness of breath with it or diaphoresis nausea or vomiting.  EKG does not show any acute changes and 2 troponin levels are normal, 6 hours apart.    I think he is at low risk for unstable angina and gave him the option of remaining in the hospital for stress test tomorrow or discharge from the hospital with stress test this coming week.  If he were to go home I have instructed him to immediately return to the emergency room should he get the discomfort back.  Patient would like to go home, and states that he lives close to [facility] and if he has any recurrent symptoms he will return at which time we would keep him in the hospital until stress test or angiography were performed.  He knows that there is some small risk that he will have recurrent discomfort.    I will set up stress test for this week.    Thank you for allowing us to participate in his care.       History of Present Illness/Subjective    Mr. Koko Ronquillo is a 76 year old male with known coronary artery disease.  Patient had non-ST elevation MI in January 2022.  He had had a more distant history of PCI in the right coronary artery in 2019.  Heart catheterization at the very beginning of this year showed severe disease in the mid LAD and ostial diagonal lesion.  Moderate disease in the circumflex system and a severe lesion in the distal right coronary artery.  The LAD and right coronary artery were intervened upon.  He has felt well since  that time and has had no recurrence of chest discomfort until last night.  He admits to being very sedentary and that he does not exercise.  He does have some shortness of breath with activity.  Last night he was just sitting and developed a discomfort in his chest which felt like heartburn.  It is hard for him to distinguish but it felt a bit like the discomfort he had at the very beginning of the year as well.  He has had heartburn all his adult life and it all seems the same to him.  He was not short of breath with the discomfort and did not have nausea or diaphoresis.  He thought he would try some Rolaids but then he remembered with Dr. Kelly griffith said to try some nitroglycerin.  He took a nitroglycerin and they started to come to the emergency room and on the way to the emergency room the discomfort went away.  The discomfort altogether lasted about 15 minutes by his best recollection.  He did not have the discomfort when he arrived in the emergency room.    He denies orthopnea, paroxysmal nocturnal dyspnea, peripheral edema occasionally gets some palpitations and has a history of atrial fibrillation.  He has not had syncopal or near syncopal episodes.    Risk factors include hypertension, hyperlipidemia, and type 2 diabetes.    Patient has also been treated for abdominal aortic aneurysm and had an endoluminal graft leak which was repaired in [place] earlier this year as well.    ECG: Personally reviewed.  Sinus rhythm at 60 bpm and no ST-T wave changes.    Troponin negative x2.  EXAM: XR CHEST 2 VIEWS  LOCATION: [facility]  DATE/TIME: 12/18/2022 12:05 AM     INDICATION: Chest pain.  COMPARISON: 01/01/2022                                                                      IMPRESSION: Hyperinflation compatible with COPD. Some minimal bibasilar chronic interstitial change. Normal heart size. Torsion aorta. Stent graft within the lower descending and upper abdominal aorta.     Physical  "Examination Review of Systems   BP (!) 155/70   Pulse 61   Temp 97.5  F (36.4  C) (Oral)   Resp 25   Ht 1.778 m (5' 10\")   Wt 106.6 kg (235 lb)   SpO2 97%   BMI 33.72 kg/m    Body mass index is 33.72 kg/m .  Wt Readings from Last 3 Encounters:   12/17/22 106.6 kg (235 lb)   03/01/22 106.1 kg (234 lb)   01/21/22 105.2 kg (232 lb)     General Appearance:   Alert, cooperative and in no acute distress.   ENT/Mouth: Patient wearing a mask.      EYES:  no scleral icterus, normal conjunctivae   Neck: JVP normal. No Hepatojugular reflux. Thyroid not visualized.   Chest/Lungs:   Lungs are clear to auscultation, equal chest wall expansion.   Cardiovascular:   S1, S2 with 1/6 systolic murmur , no clicks or rubs. Brachial, radial  pulses are intact and symetric. No carotid bruits noted   Abdomen:  Nontender. BS+.    Extremities: No cyanosis, clubbing or edema   Skin: no xanthelasma, warm.    Neurologic: normal arm movement bilateral, no tremors     Psychiatric: Appropriate affect.      Enc Vitals  BP: (!) 155/70  Pulse: 61  Resp: 25  Temp: 97.5  F (36.4  C)  Temp src: Oral  SpO2: 97 %  Weight: 106.6 kg (235 lb)  Height: 177.8 cm (5' 10\")                                           Medical History  Surgical History Family History Social History   Past Medical History:   Diagnosis Date     Anxiety      Atrial fibrillation (H)      Chest pain made worse by breathing 4/21/2016     CIS (carcinoma in situ of bladder) 8/17/2017     Fatigue 5/19/2016     Hematuria 3/1/2017     HLD (hyperlipidemia)      HTN (hypertension)      Psoriasis      RA (rheumatoid arthritis) (H)      Sleep apnea     does not use CPAP    Past Surgical History:   Procedure Laterality Date     ABDOMINAL AORTIC ANEURYSM REPAIR  1995     CV CORONARY ANGIOGRAM N/A 2/8/2019    Procedure: Coronary Angiogram;  Surgeon: El De Oliveira MD;  Location: [facility] Cath Lab;  Service: Cardiology     CV CORONARY ANGIOGRAM N/A 1/3/2022    Procedure: CV CORONARY " ANGIOGRAM;  Surgeon: Hailee Pierce MD;  Location: [facility] CATH LAB CV     CV LEFT HEART CATH N/A 1/3/2022    Procedure: Left Heart Cath;  Surgeon: Hailee Pierce MD;  Location: [facility] LAB CV     CV PCI N/A 1/3/2022    Procedure: Percutaneous Coronary Intervention;  Surgeon: Hailee Pierce MD;  Location: [facility] LAB CV     CYSTOSCOPY, TRANSURETHRAL RESECTION (TUR) TUMOR BLADDER, COMBINED N/A 3/1/2017    Procedure: CYSTOSCOPY BLADDER BIOPSY AND FULGURATION;  Surgeon: Kostas Smith MD;  Location: [facility] OR;  Service:      HERNIA REPAIR      with nesh     IR MISCELLANEOUS PROCEDURE  1/24/2007     IR THORACIC AORTOGRAM  1/24/2007     OTHER SURGICAL HISTORY      aortic bypass     OTHER SURGICAL HISTORY      nasal cauterization    Family History   Problem Relation Age of Onset     Acute Myocardial Infarction Father 58.00    Social History     Socioeconomic History     Marital status:      Spouse name: Not on file     Number of children: Not on file     Years of education: Not on file     Highest education level: Not on file   Occupational History     Not on file   Tobacco Use     Smoking status: Former     Types: Cigarettes     Quit date: 2/28/2007     Years since quitting: 15.8     Smokeless tobacco: Never   Substance and Sexual Activity     Alcohol use: Yes     Comment: Alcoholic Drinks/day: occasional     Drug use: No     Sexual activity: Not on file   Other Topics Concern     Not on file   Social History Narrative     Not on file     Social Determinants of Health     Financial Resource Strain: Not on file   Food Insecurity: Not on file   Transportation Needs: Not on file   Physical Activity: Not on file   Stress: Not on file   Social Connections: Not on file   Intimate Partner Violence: Not on file   Housing Stability: Not on file          Medications  Allergies   Current Outpatient Medications   Medication Sig Dispense Refill     albuterol (PROAIR HFA;PROVENTIL  "HFA;VENTOLIN HFA) 90 mcg/actuation inhaler Inhale 2 puffs into the lungs every 4 hours as needed       atorvastatin (LIPITOR) 80 MG tablet Take 1 tablet (80 mg) by mouth daily 90 tablet 3     clopidogrel (PLAVIX) 75 MG tablet Take 1 tablet (75 mg) by mouth daily Dose to start tomorrow. (Patient taking differently: Take 75 mg by mouth daily) 90 tablet 3     diltiazem ER COATED BEADS (CARDIZEM CD/CARTIA XT) 120 MG 24 hr capsule TAKE 1 CAPSULE(120 MG) BY MOUTH DAILY 90 capsule 1     folic acid (FOLVITE) 1 MG tablet Take 1 mg by mouth daily Do not take on methotrexate days (Tuesday)  4     hydrochlorothiazide (HYDRODIURIL) 12.5 MG tablet TAKE 1 TABLET(12.5 MG) BY MOUTH DAILY 90 tablet 1     losartan (COZAAR) 100 MG tablet TAKE 1 TABLET(100 MG) BY MOUTH DAILY 90 tablet 1     methotrexate 2.5 MG tablet Take 12.5 mg by mouth once a week Tuesdays       metoprolol succinate (TOPROL-XL) 100 MG 24 hr tablet [METOPROLOL SUCCINATE (TOPROL-XL) 100 MG 24 HR TABLET] TAKE 1 TABLET(100 MG) BY MOUTH DAILY 90 tablet 2     nitroGLYcerin (NITROSTAT) 0.4 MG sublingual tablet One tablet under the tongue every 5 minutes if needed for chest pain. May repeat every 5 minutes for a maximum of 3 doses in 15 minutes\" 25 tablet 3     tamsulosin (FLOMAX) 0.4 mg cap [TAMSULOSIN (FLOMAX) 0.4 MG CAP] Take 1 capsule (0.4 mg total) by mouth 2 (two) times a day. 180 capsule 0     blood glucose test strips [BLOOD GLUCOSE TEST STRIPS] Use 1 each As Directed 2 (two) times a day. Dispense brand per patient's insurance at pharmacy discretion. 180 strip 1     generic lancets [GENERIC LANCETS] Use 1 each As Directed 4 (four) times a day. 100 each 6    No Known Allergies      Lab Results    Chemistry/lipid CBC Cardiac Enzymes/BNP/TSH/INR   Lab Results   Component Value Date    CHOL 120 02/21/2022    HDL 27 (L) 02/21/2022    TRIG 331 (H) 02/21/2022    BUN 16 12/17/2022     12/17/2022    CO2 22 12/17/2022    Lab Results   Component Value Date    WBC 8.0 " 12/17/2022    HGB 12.8 (L) 12/17/2022    HCT 38.1 (L) 12/17/2022    MCV 93 12/17/2022     12/17/2022    Lab Results   Component Value Date    TROPONINI 0.02 12/18/2022    INR 0.95 01/03/2022

## 2022-12-18 NOTE — ED TRIAGE NOTES
Arrives to ED with c/o CP that began 30 minutes PTA. Hx of stent x2 1/2022. Took nitroglycerin x1 PTA, reports effective. Endorses SOB. Describes as burning to mid chest, denies radiation of pain.       Triage Assessment     Row Name 12/17/22 2636       Triage Assessment (Adult)    Airway WDL WDL       Respiratory WDL    Respiratory WDL WDL       Skin Circulation/Temperature WDL    Skin Circulation/Temperature WDL WDL       Cardiac WDL    Cardiac WDL X;chest pain       Peripheral/Neurovascular WDL    Peripheral Neurovascular WDL WDL       Cognitive/Neuro/Behavioral WDL    Cognitive/Neuro/Behavioral WDL WDL

## 2022-12-18 NOTE — H&P
"[facility] MEDICINE ADMISSION HISTORY AND PHYSICAL     Assessment & Plan       Koko Ronquillo is a 76 year old male who presented with complaints of chest pain, shortness of breath.    Medical history is notable for coronary disease with stent in January 2022.  Also with a history of anxiety, atrial fibrillation, bladder cancer, hypertension, sleep apnea, rheumatoid arthritis, obesity.    Initial evaluation revealed elevated blood pressure, normal heart rate, normal basic metabolic panel and LFTs, normal troponin.  Chest x-ray with no acute pulmonary edema or infiltrates.  Noted a stent graft within the lower descending and upper abdominal aorta.  EKG without ischemia.    Initial treatment included aspirin.      Assessment and plan:  Chest pain, atypical  Coronary artery disease  Descending aortic stent  Follow serial troponins, telemetry  Symptoms atypical for angina or aortic pathology  Currently symptom-free  Underwent revision of the aortic stent in the spring or summer 2022  Patient describes symptoms like heartburn  If symptoms return we will try GI cocktail  Negative stress test in March    Essential hypertension  Non-insulin-dependent diabetes  Paroxysmal atrial fibrillation, not on anticoagulation  Obstructive sleep apnea      Clinically Significant Risk Factors Present on Admission                # Drug Induced Platelet Defect: home medication list includes an antiplatelet medication   # Hypertension: home medication list includes antihypertensive(s)      # Obesity: Estimated body mass index is 33.72 kg/m  as calculated from the following:    Height as of this encounter: 1.778 m (5' 10\").    Weight as of this encounter: 106.6 kg (235 lb).             DVTP: Mechanical Prophylaxis/ Sequential Compression Devices  Code Status: Prior  Disposition: Observation   Expected LOS: 1 day  Goals for the hospitalization: Cardiac rule out  Diet: Cardiac  Fluids: None    Disposition Plan    "   Expected Discharge Date: 12/19/2022               Chief Complaint  chest pain     HISTORY   Koko Ronquillo is a 76 year old male who presented with complaints of chest pain.    Per ED provider:  Koko Ronquillo is a 76 year old male with a pertinent history of CAD, HTN, hyperlipidemia, dyslipidemia, paroxysmal atrial fibrillation, NSTEMI, GERD, AAA, TAAA, DM2, bladder cancer, who presents to this ED via walk-in for evaluation of chest pain.     Patient reports he developed chest pain about 30 minutes ago while watching TV. He describes pain as a burning sensation like heartburn. He also mentions some associating shortness of breath with onset of pain. Patient took nitroglycerin afterwards, which resolved his symptoms. At present, he denies any chest pain. He states that he had a large cardiac workup including an angiogram in January 2022. During that time, he also had a stent placed and had another one replaced. Since then, he has not had any workup since then. Patient also mentions that he has felt fatigued over the last couple weeks. Otherwise, he denies any nausea, vomiting, leg pain, and leg swelling. Patient takes Plavix regularly but no aspirin. No allergies to medications. No other complaints at this time.    Symptoms started earlier in the day.  He said that it felt like heartburn.  There was no radiation to the back neck jaw or arms.  It was not associated with any nausea or diaphoresis or shortness of breath.  He remembers his cardiologist saying that if he should have chest pain he should take a nitroglycerin and come to the emergency department which is what he did.  He rated the severity of the pain at about 5 out of 10.  He said by the time that he got to the emergency department the pain was gone.  Past Medical History     Past Medical History:  No date: Anxiety  No date: Atrial fibrillation (H)  4/21/2016: Chest pain made worse by breathing  8/17/2017: CIS (carcinoma in situ of  bladder)  5/19/2016: Fatigue  3/1/2017: Hematuria  No date: HLD (hyperlipidemia)  No date: HTN (hypertension)  No date: Psoriasis  No date: RA (rheumatoid arthritis) (H)  No date: Sleep apnea      Comment:  does not use CPAP     Surgical History     Past Surgical History:   Procedure Laterality Date     ABDOMINAL AORTIC ANEURYSM REPAIR  1995     CV CORONARY ANGIOGRAM N/A 2/8/2019    Procedure: Coronary Angiogram;  Surgeon: El De Oliveira MD;  Location: [facility] Cath Lab;  Service: Cardiology     CV CORONARY ANGIOGRAM N/A 1/3/2022    Procedure: CV CORONARY ANGIOGRAM;  Surgeon: Hailee Pierce MD;  Location: [facility] CATH LAB CV     CV LEFT HEART CATH N/A 1/3/2022    Procedure: Left Heart Cath;  Surgeon: Hailee Pierce MD;  Location: [facility] LAB CV     CV PCI N/A 1/3/2022    Procedure: Percutaneous Coronary Intervention;  Surgeon: Hailee Pierce MD;  Location: [facility] CV     CYSTOSCOPY, TRANSURETHRAL RESECTION (TUR) TUMOR BLADDER, COMBINED N/A 3/1/2017    Procedure: CYSTOSCOPY BLADDER BIOPSY AND FULGURATION;  Surgeon: Kostas Smith MD;  Location: [facility] OR;  Service:      HERNIA REPAIR      with nesh     IR MISCELLANEOUS PROCEDURE  1/24/2007     IR THORACIC AORTOGRAM  1/24/2007     OTHER SURGICAL HISTORY      aortic bypass     OTHER SURGICAL HISTORY      nasal cauterization     Family History      Family History   Problem Relation Age of Onset     Acute Myocardial Infarction Father 58.00      Social History      Social History     Tobacco Use     Smoking status: Former     Types: Cigarettes     Quit date: 2/28/2007     Years since quitting: 15.8     Smokeless tobacco: Never   Substance Use Topics     Alcohol use: Yes     Comment: Alcoholic Drinks/day: occasional     Drug use: No      Allergies   No Known Allergies  Prior to Admission Medications      Prior to Admission Medications   Prescriptions Last Dose Informant Patient Reported? Taking?   albuterol (PROAIR  "HFA;PROVENTIL HFA;VENTOLIN HFA) 90 mcg/actuation inhaler   Yes No   Sig: [ALBUTEROL (PROAIR HFA;PROVENTIL HFA;VENTOLIN HFA) 90 MCG/ACTUATION INHALER] Inhale 1 puff daily as needed.   aspirin (ASA) 81 MG chewable tablet   No No   Sig: Take 1 tablet (81 mg) by mouth daily Starting tomorrow.   atorvastatin (LIPITOR) 80 MG tablet   No No   Sig: Take 1 tablet (80 mg) by mouth daily   blood glucose test strips   No No   Sig: [BLOOD GLUCOSE TEST STRIPS] Use 1 each As Directed 2 (two) times a day. Dispense brand per patient's insurance at pharmacy discretion.   clopidogrel (PLAVIX) 75 MG tablet   No No   Sig: Take 1 tablet (75 mg) by mouth daily Dose to start tomorrow.   diltiazem ER COATED BEADS (CARDIZEM CD/CARTIA XT) 120 MG 24 hr capsule   No No   Sig: TAKE 1 CAPSULE(120 MG) BY MOUTH DAILY   folic acid (FOLVITE) 1 MG tablet   Yes No   Sig: Take 2 mg by mouth daily Do not take on methotrexate days (Tuesday)   generic lancets   No No   Sig: [GENERIC LANCETS] Use 1 each As Directed 4 (four) times a day.   hydrochlorothiazide (HYDRODIURIL) 12.5 MG tablet   No No   Sig: TAKE 1 TABLET(12.5 MG) BY MOUTH DAILY   losartan (COZAAR) 100 MG tablet   No No   Sig: TAKE 1 TABLET(100 MG) BY MOUTH DAILY   metFORMIN (GLUCOPHAGE) 500 MG tablet   No No   Sig: [METFORMIN (GLUCOPHAGE) 500 MG TABLET] TAKE 1 TABLET BY MOUTH EVERY MORNING AND 2 TABLETS AT NIGHT.   methotrexate 2.5 MG tablet   Yes No   Sig: Take 10 mg by mouth once a week Tuesdays   metoprolol succinate (TOPROL-XL) 100 MG 24 hr tablet   No No   Sig: [METOPROLOL SUCCINATE (TOPROL-XL) 100 MG 24 HR TABLET] TAKE 1 TABLET(100 MG) BY MOUTH DAILY   nitroGLYcerin (NITROSTAT) 0.4 MG sublingual tablet   No No   Sig: One tablet under the tongue every 5 minutes if needed for chest pain. May repeat every 5 minutes for a maximum of 3 doses in 15 minutes\"   tamsulosin (FLOMAX) 0.4 mg cap   No No   Sig: [TAMSULOSIN (FLOMAX) 0.4 MG CAP] Take 1 capsule (0.4 mg total) by mouth 2 (two) times a day. "      Facility-Administered Medications: None      Review of Systems     A 12 point comprehensive review of systems was negative except as noted above in HPI.    PHYSICAL EXAMINATION     Vitals      Temp:  [97.5  F (36.4  C)] 97.5  F (36.4  C)  Pulse:  [59-66] 59  Resp:  [20-24] 24  BP: (152-161)/(70-80) 152/70  SpO2:  [97 %-98 %] 97 %    Examination   Physical Exam:    Gen: no acute distress, comfortable, alert, pleasant  ENT: no scleral icterus  Pulm: Breathing comfortably in room air at rest  CV: regular rate and rhythm noted on telemetry  Derm: Not pale, no jaundice  Psych: appropriate affect      Pertinent Radiology     Radiology Results:   Recent Results (from the past 24 hour(s))   XR Chest 2 Views    Narrative    EXAM: XR CHEST 2 VIEWS  LOCATION: [facility]  DATE/TIME: 12/18/2022 12:05 AM    INDICATION: Chest pain.  COMPARISON: 01/01/2022      Impression    IMPRESSION: Hyperinflation compatible with COPD. Some minimal bibasilar chronic interstitial change. Normal heart size. Torsion aorta. Stent graft within the lower descending and upper abdominal aorta.     EKG Results: personally reviewed.     Advance Care Planning        Stephan Akhtar DO  [facility]  [facility]   Phone: #[phone]

## 2022-12-18 NOTE — ED PROVIDER NOTES
EMERGENCY DEPARTMENT ENCOUNTER      NAME: Koko Ronquillo  AGE: 76 year old male  YOB: 1945  MRN: [id number]  EVALUATION DATE & TIME: 12/17/2022 11:01 PM    PCP: Jelani Shabazz    ED PROVIDER: Jesus Cabral M.D.      Chief Complaint   Patient presents with     Chest Pain         FINAL IMPRESSION:  1. Chest pain, unspecified type          ED COURSE & MEDICAL DECISION MAKING:    Pertinent Labs & Imaging studies reviewed. (See chart for details)  76 year old male presents to the Emergency Department for evaluation of pain.  Somewhat concerning as he does have a history of having multiple stents placed in January.  EKG does not show any obvious ischemic changes.  Initial troponin is negative.  Hemoglobin is 12.8.  Chest x-ray is clear.  No signs of pneumonia pneumothorax or other cause of this pain.  Pain is gone after nitro.  Will give aspirin here.  Given his significant history patient is high risk and will be admitted.  Discussed with hospitalist who accepts patient for observation admission.    11:11 PM I met with the patient to gather history and to perform my initial exam. I discussed the plan for care while in the Emergency Department. PPE: gloves, surgical mask, and eye protection.  12:53 AM I discussed case with Dr. Akhtar, hospitalist, who accepts the patient for admission.    At the conclusion of the encounter I discussed the results of all of the tests and the disposition. The questions were answered. The patient or family acknowledged understanding and was agreeable with the care plan.       MEDICATIONS GIVEN IN THE EMERGENCY:  Medications   aspirin EC tablet 81 mg (has no administration in time range)   nitroGLYcerin (NITROSTAT) sublingual tablet 0.4 mg (has no administration in time range)   alum & mag hydroxide-simethicone (MAALOX) suspension 30 mL (has no administration in time range)   acetaminophen (TYLENOL) tablet 975 mg (has no administration in time range)   lidocaine (viscous)  (XYLOCAINE) 2 % 15 mL, alum & mag hydroxide-simethicone (MAALOX) 15 mL GI Cocktail (has no administration in time range)   aspirin (ASA) chewable tablet 81 mg (81 mg Oral Given 12/17/22 5725)       NEW PRESCRIPTIONS STARTED AT TODAY'S ER VISIT  New Prescriptions    No medications on file          =================================================================    HPI    Patient information was obtained from: Patient    Use of : N/A        Koko Ronquillo is a 76 year old male with a pertinent history of CAD, HTN, hyperlipidemia, dyslipidemia, paroxysmal atrial fibrillation, NSTEMI, GERD, AAA, TAAA, DM2, bladder cancer, who presents to this ED via walk-in for evaluation of chest pain.    Patient reports he developed chest pain about 30 minutes ago while watching TV. He describes pain as a burning sensation like heartburn. He also mentions some associating shortness of breath with onset of pain. Patient took nitroglycerin afterwards, which resolved his symptoms. At present, he denies any chest pain. He states that he had a large cardiac workup including an angiogram in January 2022. During that time, he also had a stent placed and had another one replaced. Since then, he has not had any workup since then. Patient also mentions that he has felt fatigued over the last couple weeks. Otherwise, he denies any nausea, vomiting, leg pain, and leg swelling. Patient takes Plavix regularly but no aspirin. No allergies to medications. No other complaints at this time.     Per chart review, patient ws seen at [facility] on 1/1/22-1/322 for chest pain. Patient was admitted for evaluation and had normal EKG changes. Troponins had mild elevation which cam down after he was given heparin. Patient was evaluated by cardiology. Heparin drip was started. He was also recommended to have an angiogram. Patient transferred to [facility] for further cardiac study.     Per chart review, patient was see at [facility] on 1/3/22-1/4/22 for NSTEMI, s/p PCI to LAD and RCA. Patient was initially treated with heparin drip and transferred to [facility] for coronary angiography. Angiogram was done on 13/22 and patient had stenting done to LAD and RCA. He was noted to have preserved ejection fraction. Angiogram also showed technically challenging study.Definity contrast utlilized. The left ventricle is normal in size. There is mild concentric left ventricular hypertrophy. Left ventricular systolic function is normal. The visual ejection fraction is 55-60%. Normal left ventricular wall motion. The right entricle is not well visualized. TAPSE is normal, which is consistent with normal right ventricular systolic function.The left atrium is not well visualized. Right atrium not well visualized. Valve structures are not optimally visualized.No obvious significant valve abnormality observed. TDS-Definity used (no complications). The aortic root measures mildly enlarged. Patient was continued on previous cardiac medications with addition of aspirin to regimen and atorvastatin in place of his previous pravastatin. Patient discharged home with recommendation to follow up with primary care provider and cardiology. Referral was placed to cardiac rehab.    REVIEW OF SYSTEMS   Review of Systems   Constitutional: Positive for fatigue.   Respiratory: Positive for shortness of breath.    Cardiovascular: Positive for chest pain (resolved). Negative for leg swelling.   Gastrointestinal: Negative for nausea and vomiting.   Musculoskeletal:        Negative for leg pain   All other systems reviewed and are negative.     PAST MEDICAL HISTORY:  Past Medical History:   Diagnosis Date     Anxiety      Atrial fibrillation (H)      Chest pain made worse by breathing 4/21/2016     CIS (carcinoma in situ of bladder) 8/17/2017     Fatigue 5/19/2016     Hematuria 3/1/2017     HLD (hyperlipidemia)      HTN (hypertension)      Psoriasis      RA  (rheumatoid arthritis) (H)      Sleep apnea     does not use CPAP       PAST SURGICAL HISTORY:  Past Surgical History:   Procedure Laterality Date     ABDOMINAL AORTIC ANEURYSM REPAIR  1995     CV CORONARY ANGIOGRAM N/A 2/8/2019    Procedure: Coronary Angiogram;  Surgeon: El De Oliveira MD;  Location: [facility] Cath Lab;  Service: Cardiology     CV CORONARY ANGIOGRAM N/A 1/3/2022    Procedure: CV CORONARY ANGIOGRAM;  Surgeon: Hailee Pierce MD;  Location: [facility] CATH LAB CV     CV LEFT HEART CATH N/A 1/3/2022    Procedure: Left Heart Cath;  Surgeon: Hailee Pierce MD;  Location: [facility] LAB CV     CV PCI N/A 1/3/2022    Procedure: Percutaneous Coronary Intervention;  Surgeon: Hailee Pierce MD;  Location: [facility] CV     CYSTOSCOPY, TRANSURETHRAL RESECTION (TUR) TUMOR BLADDER, COMBINED N/A 3/1/2017    Procedure: CYSTOSCOPY BLADDER BIOPSY AND FULGURATION;  Surgeon: Kostas Smith MD;  Location: [facility];  Service:      HERNIA REPAIR      with nesh     IR MISCELLANEOUS PROCEDURE  1/24/2007     IR THORACIC AORTOGRAM  1/24/2007     OTHER SURGICAL HISTORY      aortic bypass     OTHER SURGICAL HISTORY      nasal cauterization           CURRENT MEDICATIONS:    Current Facility-Administered Medications   Medication     acetaminophen (TYLENOL) tablet 975 mg     alum & mag hydroxide-simethicone (MAALOX) suspension 30 mL     [START ON 12/19/2022] aspirin EC tablet 81 mg     lidocaine (viscous) (XYLOCAINE) 2 % 15 mL, alum & mag hydroxide-simethicone (MAALOX) 15 mL GI Cocktail     nitroGLYcerin (NITROSTAT) sublingual tablet 0.4 mg     Current Outpatient Medications   Medication     albuterol (PROAIR HFA;PROVENTIL HFA;VENTOLIN HFA) 90 mcg/actuation inhaler     aspirin (ASA) 81 MG chewable tablet     atorvastatin (LIPITOR) 80 MG tablet     blood glucose test strips     clopidogrel (PLAVIX) 75 MG tablet     diltiazem ER COATED BEADS (CARDIZEM CD/CARTIA XT) 120 MG 24 hr capsule      "folic acid (FOLVITE) 1 MG tablet     generic lancets     hydrochlorothiazide (HYDRODIURIL) 12.5 MG tablet     losartan (COZAAR) 100 MG tablet     metFORMIN (GLUCOPHAGE) 500 MG tablet     methotrexate 2.5 MG tablet     metoprolol succinate (TOPROL-XL) 100 MG 24 hr tablet     nitroGLYcerin (NITROSTAT) 0.4 MG sublingual tablet     tamsulosin (FLOMAX) 0.4 mg cap         ALLERGIES:  No Known Allergies    FAMILY HISTORY:  Family History   Problem Relation Age of Onset     Acute Myocardial Infarction Father 58.00       SOCIAL HISTORY:   Social History     Socioeconomic History     Marital status:    Tobacco Use     Smoking status: Former     Types: Cigarettes     Quit date: 2/28/2007     Years since quitting: 15.8     Smokeless tobacco: Never   Substance and Sexual Activity     Alcohol use: Yes     Comment: Alcoholic Drinks/day: occasional     Drug use: No       VITALS:  BP (!) 152/70   Pulse 59   Temp 97.5  F (36.4  C) (Oral)   Resp 24   Ht 1.778 m (5' 10\")   Wt 106.6 kg (235 lb)   SpO2 97%   BMI 33.72 kg/m      PHYSICAL EXAM    Physical Exam  Vitals and nursing note reviewed.   Constitutional:       General: He is not in acute distress.     Appearance: He is not diaphoretic.   HENT:      Head: Atraumatic.   Eyes:      General: No scleral icterus.     Pupils: Pupils are equal, round, and reactive to light.   Cardiovascular:      Rate and Rhythm: Normal rate and regular rhythm.      Heart sounds: Normal heart sounds.   Pulmonary:      Effort: No respiratory distress.      Breath sounds: Normal breath sounds.   Abdominal:      Palpations: Abdomen is soft.      Tenderness: There is no abdominal tenderness.   Musculoskeletal:         General: No tenderness.   Lymphadenopathy:      Cervical: No cervical adenopathy.   Skin:     General: Skin is warm.      Findings: No rash.           LAB:  All pertinent labs reviewed and interpreted.  Labs Ordered and Resulted from Time of ED Arrival to Time of ED Departure "   COMPREHENSIVE METABOLIC PANEL - Abnormal       Result Value    Sodium 140      Potassium 3.7      Chloride 106      Carbon Dioxide (CO2) 22      Anion Gap 12      Urea Nitrogen 16      Creatinine 1.11      Calcium 8.9      Glucose 143 (*)     Alkaline Phosphatase 119      AST 22      ALT 35      Protein Total 7.2      Albumin 3.7      Bilirubin Total 0.5      GFR Estimate 68     CBC WITH PLATELETS AND DIFFERENTIAL - Abnormal    WBC Count 8.0      RBC Count 4.10 (*)     Hemoglobin 12.8 (*)     Hematocrit 38.1 (*)     MCV 93      MCH 31.2      MCHC 33.6      RDW 13.9      Platelet Count 232      % Neutrophils 58      % Lymphocytes 29      % Monocytes 8      % Eosinophils 3      % Basophils 2      % Immature Granulocytes 0      NRBCs per 100 WBC 0      Absolute Neutrophils 4.6      Absolute Lymphocytes 2.3      Absolute Monocytes 0.7      Absolute Eosinophils 0.2      Absolute Basophils 0.1      Absolute Immature Granulocytes 0.0      Absolute NRBCs 0.0     TROPONIN I - Normal    Troponin I 0.04     TROPONIN I   TROPONIN I       RADIOLOGY:  Reviewed all pertinent imaging. Please see official radiology report.  XR Chest 2 Views   Final Result   IMPRESSION: Hyperinflation compatible with COPD. Some minimal bibasilar chronic interstitial change. Normal heart size. Torsion aorta. Stent graft within the lower descending and upper abdominal aorta.          EKG:    Performed at: 2311  Impression: Normal EKG.  Unchanged from 1/4/22  NSR with rate of 60. .  QRS 84..    I have independently reviewed and interpreted the EKG(s) documented above.          I, Erum Pollack, am serving as a scribe to document services personally performed by Dr. Jesus Cabral, based on my observation and the provider's statements to me. I, Jesus Cabral MD attest that Erum Pollack is acting in a scribe capacity, has observed my performance of the services and has documented them in accordance with my direction.    Jesus Cabral,  M.D.  Emergency Medicine  [facility] EMERGENCY ROOM  [address] [id number] [phone]  Dept: [phone]     Jesus Cabral MD  12/18/22 0425

## 2022-12-18 NOTE — CONSULTS
Care Management Initial Consult    General Information  Assessment completed with: Patient,         Primary Care Provider verified and updated as needed: Yes   Readmission within the last 30 days: no previous admission in last 30 days   Advance Care Planning: Advance Care Planning Reviewed:  (does not have HCD, declines blank HCD)        Communication Assessment  Patient's communication style: spoken language (English or Bilingual)        Cognitive  Cognitive/Neuro/Behavioral: WDL                      Living Environment:   People in home: spouse  Ellen  Current living Arrangements: house      Able to return to prior arrangements: yes     Family/Social Support:  Care provided by: self  Provides care for: no one  Marital Status:   Wife, Children (he has 5 kids and his wife has 1 living child)  Ellen       Description of Support System: Supportive, Involved       Current Resources:   Patient receiving home care services: No  Community Resources: None  Equipment currently used at home:  (Rx glasses)  Supplies currently used at home: None    Employment/Financial:  Employment Status: retired        Financial Concerns: No concerns identified   Referral to Financial Worker: No       Lifestyle & Psychosocial Needs:  Social Determinants of Health     Tobacco Use: Medium Risk     Smoking Tobacco Use: Former     Smokeless Tobacco Use: Never     Passive Exposure: Not on file   Alcohol Use: Not on file   Financial Resource Strain: Not on file   Food Insecurity: Not on file   Transportation Needs: Not on file   Physical Activity: Not on file   Stress: Not on file   Social Connections: Not on file   Intimate Partner Violence: Not on file   Depression: Not on file   Housing Stability: Not on file     Functional Status:  Prior to admission patient needed assistance:   Dependent ADLs:: Independent  Dependent IADLs:: Independent     Mental Health Status:  Mental Health Status:  (denies)       Chemical Dependency  Status:  Chemical Dependency Status:  (denies)           Values/Beliefs:  Spiritual, Cultural Beliefs, [employer] Practices, Values that affect care: no               Additional Information:  Chart reviewed.   CM met with patient; assessment completed. Lives with wife in private home. Independent with all cares, including driving. Rx glasses; no other assistive devices. PCP confirmed. No services.   CM provided MOON notice and education. Has [employer] Medicare Advantage Plan. He verbalized understanding and signed. Original in chart and copy to patient.   Will return home with wife; no CM needs. Wife to transport home.     Care Management Discharge Note    Discharge Date: 12/18/2022       Discharge Disposition: Home (with wife)    Discharge Services: None    Discharge DME: None    Discharge Transportation: family or friend will provide    Private pay costs discussed: N/A  PAS Confirmation Code:  N/A  Patient/family educated on Medicare website which has current facility and service quality ratings:  (N/A)    Education Provided on the Discharge Plan:  No CM needs. AVS per bedside RN.   Persons Notified of Discharge Plans: patient   Patient/Family in Agreement with the Plan: no    Handoff Referral Completed: No CM needs. Wife will transport.     Additional Information:  No CM needs. Wife will transport home.         April Beasley, RN          April Beasley, RN

## 2022-12-19 ENCOUNTER — TELEPHONE (OUTPATIENT)
Dept: NURSING | Facility: CLINIC | Age: 77
End: 2022-12-19

## 2022-12-19 ENCOUNTER — TELEPHONE (OUTPATIENT)
Dept: CARDIOLOGY | Facility: CLINIC | Age: 77
End: 2022-12-19

## 2022-12-19 DIAGNOSIS — I25.10 CORONARY ARTERY DISEASE DUE TO LIPID RICH PLAQUE: Primary | ICD-10-CM

## 2022-12-19 DIAGNOSIS — I10 ESSENTIAL HYPERTENSION: ICD-10-CM

## 2022-12-19 DIAGNOSIS — I25.83 CORONARY ARTERY DISEASE DUE TO LIPID RICH PLAQUE: Primary | ICD-10-CM

## 2022-12-19 DIAGNOSIS — I48.91 NEW ONSET ATRIAL FIBRILLATION (H): ICD-10-CM

## 2022-12-19 NOTE — TELEPHONE ENCOUNTER
----- Message from Paramjit Fong MD sent at 12/18/2022  9:22 AM CST -----  Samina and Tasha,    This patient was discharged from the hospital on Sunday.  Can we get a pharmacologic nuclear study this week at [facility]?  He could probably travel to [place] if more availability there.    Results should go to Dr. Argueta but they could come to me as well.    Thanks,    Paramjit    ==  Order placed per Dr. Fong. Results to go to [facility].  Message to scheduling to contact patient. -sita

## 2022-12-19 NOTE — TELEPHONE ENCOUNTER
Patient recently seen in ED for chest pain, admitted under observation. Confused on the medications listed on his AVS. Explained changes in his medications: APAP to be taken PRN for mild pain. ASA 81 mg to be taken daily. Continue taking Plavix as ordered.    Patient had no other questions. Did not triage.    Thu Fong RN on 12/19/2022 at 10:44 AM

## 2022-12-29 ENCOUNTER — HOSPITAL ENCOUNTER (OUTPATIENT)
Dept: NUCLEAR MEDICINE | Facility: CLINIC | Age: 77
Discharge: HOME | End: 2022-12-29
Attending: INTERNAL MEDICINE
Payer: COMMERCIAL

## 2022-12-29 ENCOUNTER — HOSPITAL ENCOUNTER (OUTPATIENT)
Dept: CARDIOLOGY | Facility: CLINIC | Age: 77
Discharge: HOME | End: 2022-12-29
Attending: INTERNAL MEDICINE
Payer: COMMERCIAL

## 2022-12-29 DIAGNOSIS — I25.10 CORONARY ARTERY DISEASE DUE TO LIPID RICH PLAQUE: ICD-10-CM

## 2022-12-29 DIAGNOSIS — I25.83 CORONARY ARTERY DISEASE DUE TO LIPID RICH PLAQUE: ICD-10-CM

## 2022-12-29 DIAGNOSIS — I48.91 NEW ONSET ATRIAL FIBRILLATION (H): ICD-10-CM

## 2022-12-29 DIAGNOSIS — I10 ESSENTIAL HYPERTENSION: ICD-10-CM

## 2022-12-29 LAB
CV STRESS CURRENT BP HE: NORMAL
CV STRESS CURRENT HR HE: 57
CV STRESS CURRENT HR HE: 57
CV STRESS CURRENT HR HE: 65
CV STRESS CURRENT HR HE: 65
CV STRESS CURRENT HR HE: 66
CV STRESS CURRENT HR HE: 67
CV STRESS CURRENT HR HE: 68
CV STRESS CURRENT HR HE: 69
CV STRESS CURRENT HR HE: 70
CV STRESS CURRENT HR HE: 74
CV STRESS CURRENT HR HE: 75
CV STRESS CURRENT HR HE: 75
CV STRESS CURRENT HR HE: 80
CV STRESS CURRENT HR HE: 82
CV STRESS CURRENT HR HE: 83
CV STRESS CURRENT HR HE: 84
CV STRESS DEVIATION TIME HE: NORMAL
CV STRESS ECHO PERCENT HR HE: NORMAL
CV STRESS EXERCISE STAGE HE: NORMAL
CV STRESS FINAL RESTING BP HE: NORMAL
CV STRESS FINAL RESTING HR HE: 67
CV STRESS MAX HR HE: 84
CV STRESS MAX TREADMILL GRADE HE: 0
CV STRESS MAX TREADMILL SPEED HE: 0
CV STRESS PEAK DIA BP HE: NORMAL
CV STRESS PEAK SYS BP HE: NORMAL
CV STRESS PHASE HE: NORMAL
CV STRESS PROTOCOL HE: NORMAL
CV STRESS ST DEVIATION AMOUNT HE: NORMAL
CV STRESS ST DEVIATION ELEVATION HE: NORMAL
CV STRESS ST EVELATION AMOUNT HE: NORMAL
CV STRESS TEST TYPE HE: NORMAL
CV STRESS TOTAL STAGE TIME MIN 1 HE: NORMAL
NUC STRESS EJECTION FRACTION: 59 %
RATE PRESSURE PRODUCT: NORMAL
STRESS ECHO BASELINE DIASTOLIC HE: 67
STRESS ECHO BASELINE HR: 54
STRESS ECHO BASELINE SYSTOLIC BP: 150
STRESS ECHO CALCULATED PERCENT HR: 59 %
STRESS ECHO LAST STRESS DIASTOLIC BP: 81
STRESS ECHO LAST STRESS HR: 75
STRESS ECHO LAST STRESS SYSTOLIC BP: 162
STRESS ECHO TARGET HR: 142

## 2022-12-29 PROCEDURE — 93017 CV STRESS TEST TRACING ONLY: CPT | Performed by: INTERNAL MEDICINE

## 2022-12-29 PROCEDURE — A9500 TC99M SESTAMIBI: HCPCS | Performed by: INTERNAL MEDICINE

## 2022-12-29 PROCEDURE — 250N000011 HC RX IP 250 OP 636: Performed by: INTERNAL MEDICINE

## 2022-12-29 PROCEDURE — 93017 CV STRESS TEST TRACING ONLY: CPT

## 2022-12-29 PROCEDURE — 93016 CV STRESS TEST SUPVJ ONLY: CPT | Performed by: INTERNAL MEDICINE

## 2022-12-29 PROCEDURE — 343N000001 HC RX 343: Performed by: INTERNAL MEDICINE

## 2022-12-29 PROCEDURE — 93018 CV STRESS TEST I&R ONLY: CPT | Performed by: INTERNAL MEDICINE

## 2022-12-29 PROCEDURE — 78452 HT MUSCLE IMAGE SPECT MULT: CPT

## 2022-12-29 PROCEDURE — 78452 HT MUSCLE IMAGE SPECT MULT: CPT | Mod: 26 | Performed by: INTERNAL MEDICINE

## 2022-12-29 RX ORDER — REGADENOSON 0.08 MG/ML
0.4 INJECTION, SOLUTION INTRAVENOUS ONCE
Status: COMPLETED | OUTPATIENT
Start: 2022-12-29 | End: 2022-12-29

## 2022-12-29 RX ORDER — AMINOPHYLLINE 25 MG/ML
50 INJECTION, SOLUTION INTRAVENOUS
Status: DISCONTINUED | OUTPATIENT
Start: 2022-12-29 | End: 2022-12-29 | Stop reason: HOSPADM

## 2022-12-29 RX ORDER — ALBUTEROL SULFATE 0.83 MG/ML
2.5 SOLUTION RESPIRATORY (INHALATION)
Status: DISCONTINUED | OUTPATIENT
Start: 2022-12-29 | End: 2022-12-29 | Stop reason: HOSPADM

## 2022-12-29 RX ORDER — CAFFEINE 200 MG
200 TABLET ORAL
Status: DISCONTINUED | OUTPATIENT
Start: 2022-12-29 | End: 2022-12-29 | Stop reason: HOSPADM

## 2022-12-29 RX ORDER — CAFFEINE CITRATE 20 MG/ML
60 SOLUTION INTRAVENOUS
Status: DISCONTINUED | OUTPATIENT
Start: 2022-12-29 | End: 2022-12-29 | Stop reason: HOSPADM

## 2022-12-29 RX ADMIN — REGADENOSON 0.4 MG: 0.08 INJECTION, SOLUTION INTRAVENOUS at 09:06

## 2022-12-29 RX ADMIN — Medication 8.13 MILLICURIE: at 08:00

## 2022-12-29 RX ADMIN — Medication 37.3 MILLICURIE: at 09:07

## 2023-02-14 DIAGNOSIS — I71.40 ABDOMINAL AORTIC ANEURYSM (AAA) WITHOUT RUPTURE (H): ICD-10-CM

## 2023-02-14 DIAGNOSIS — I10 ESSENTIAL HYPERTENSION: ICD-10-CM

## 2023-02-15 RX ORDER — HYDROCHLOROTHIAZIDE 12.5 MG/1
TABLET ORAL
Qty: 90 TABLET | Refills: 1 | Status: SHIPPED | OUTPATIENT
Start: 2023-02-15 | End: 2023-05-31

## 2023-02-15 RX ORDER — LOSARTAN POTASSIUM 100 MG/1
TABLET ORAL
Qty: 90 TABLET | Refills: 1 | Status: SHIPPED | OUTPATIENT
Start: 2023-02-15 | End: 2023-05-31

## 2023-02-28 ENCOUNTER — OFFICE VISIT (OUTPATIENT)
Dept: CARDIOLOGY | Facility: CLINIC | Age: 78
End: 2023-02-28
Attending: INTERNAL MEDICINE
Payer: COMMERCIAL

## 2023-02-28 VITALS
HEART RATE: 56 BPM | RESPIRATION RATE: 28 BRPM | WEIGHT: 247.3 LBS | DIASTOLIC BLOOD PRESSURE: 62 MMHG | HEIGHT: 70 IN | SYSTOLIC BLOOD PRESSURE: 141 MMHG | BODY MASS INDEX: 35.4 KG/M2

## 2023-02-28 DIAGNOSIS — I48.91 NEW ONSET ATRIAL FIBRILLATION (H): ICD-10-CM

## 2023-02-28 DIAGNOSIS — I48.0 PAROXYSMAL ATRIAL FIBRILLATION (H): ICD-10-CM

## 2023-02-28 DIAGNOSIS — I25.83 CORONARY ARTERY DISEASE DUE TO LIPID RICH PLAQUE: Primary | ICD-10-CM

## 2023-02-28 DIAGNOSIS — I25.10 CORONARY ARTERY DISEASE DUE TO LIPID RICH PLAQUE: Primary | ICD-10-CM

## 2023-02-28 DIAGNOSIS — G89.29 CHRONIC BILATERAL LOW BACK PAIN WITHOUT SCIATICA: ICD-10-CM

## 2023-02-28 DIAGNOSIS — E66.01 CLASS 3 SEVERE OBESITY DUE TO EXCESS CALORIES WITHOUT SERIOUS COMORBIDITY IN ADULT, UNSPECIFIED BMI (H): ICD-10-CM

## 2023-02-28 DIAGNOSIS — I10 ESSENTIAL HYPERTENSION: ICD-10-CM

## 2023-02-28 DIAGNOSIS — E78.00 PURE HYPERCHOLESTEROLEMIA: ICD-10-CM

## 2023-02-28 DIAGNOSIS — I10 ESSENTIAL HYPERTENSION WITH GOAL BLOOD PRESSURE LESS THAN 140/90: ICD-10-CM

## 2023-02-28 DIAGNOSIS — G47.33 OSA (OBSTRUCTIVE SLEEP APNEA): ICD-10-CM

## 2023-02-28 DIAGNOSIS — E11.9 TYPE 2 DIABETES MELLITUS TREATED WITHOUT INSULIN (H): ICD-10-CM

## 2023-02-28 DIAGNOSIS — M54.50 CHRONIC BILATERAL LOW BACK PAIN WITHOUT SCIATICA: ICD-10-CM

## 2023-02-28 DIAGNOSIS — E66.813 CLASS 3 SEVERE OBESITY DUE TO EXCESS CALORIES WITHOUT SERIOUS COMORBIDITY IN ADULT, UNSPECIFIED BMI (H): ICD-10-CM

## 2023-02-28 PROBLEM — R07.9 ACUTE CHEST PAIN: Status: RESOLVED | Noted: 2019-02-07 | Resolved: 2023-02-28

## 2023-02-28 PROCEDURE — 99214 OFFICE O/P EST MOD 30 MIN: CPT | Performed by: INTERNAL MEDICINE

## 2023-02-28 RX ORDER — KETOCONAZOLE 20 MG/ML
SHAMPOO TOPICAL
COMMUNITY
Start: 2023-01-09

## 2023-02-28 RX ORDER — DILTIAZEM HYDROCHLORIDE 180 MG/1
180 CAPSULE, COATED, EXTENDED RELEASE ORAL DAILY
Qty: 90 CAPSULE | Refills: 4 | Status: SHIPPED | OUTPATIENT
Start: 2023-03-28 | End: 2024-04-02

## 2023-02-28 NOTE — PROGRESS NOTES
[facility]  Heart Care Clinic Follow-up Note    Assessment & Plan        (I25.10,  I25.83) Coronary artery disease due to lipid rich plaque  (primary encounter diagnosis)  Comment: Due to borderline abnormal troponin in 2019 0.35 and another abnormal troponin December 31, 2022 of 0.33 he underwent angiography.  January 3, 2022 angiography showed left main 10% stenosis, proximal LAD 25% followed by mid 25% followed by 90% mid lesion that received a drug-coated stent as well as a second diagonal 90% stenosis.  The circumflex had a proximal 20% lesion, mid 40% lesion, with a third obtuse marginal artery 50% stenosis in the fourth obtuse marginal artery 50% stenosis.  Right coronary artery had a proximal 10% lesion, there is a patent stent, distal right coronary artery 80% stenosis which was intervened  with drug-coated stent as well as a distal 50% lesion.  He had some atypical chest discomfort around December, presented to the hospital, had unremarkable stress nuclear, but still has his shortness of breath.    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  [facility] had repeat intervention, doing better, will be arranging for relook with [facility] the next several months.    (I10) Hypertension  Comment: Slightly elevated, I have taken the liberty given his resistant hypertension of increasing Cardizem from 120 to 180.  Currently on diltiazem, losartan, HCTZ, as well as metoprolol.    (E78.00) Pure hypercholesterolemia  Comment: Total cholesterol 120 with an LDL of 27 which is excellent.    (G47.33) SHAWN (obstructive sleep apnea)  Comment: So noted, work on weight loss.    (E66.01) Class 3 severe obesity due to excess calories without serious comorbidity in adult, unspecified BMI (H)  Comment: Work on weight loss.    (E11.9) Type 2 diabetes mellitus treated without insulin  (H)  Comment: Hemoglobin A1c 7.1 and defer to primary.    (I48.0) Paroxysmal atrial fibrillation (H)  Comment: Paroxysmal, possibly due to sleep apnea, no recent episodes since before 2016.  Currently not on anticoagulation given this, repeat event monitor 2021 showed no major arrhythmia, and did have one episode lasting maybe 5 minutes of palpitations, this is maybe once a month.  Hold off on further evaluation just yet.  And if recurrent atrial fibrillation seen start Eliquis 5 mg p.o. twice daily given his CHADS 2 VASC score of 3.     (M54.50,  G89.29) Chronic bilateral low back pain without sciatica  Comment: Biggest issue, defer to primary.    Plan  1.  Work on weight loss.  2.  Increase Cardizem from 120 up to 180.  3.  Shortness of breath persist let us know and we will arrange for angiography.  4.  Follow-up with me in 1 year or sooner if needed.    Subjective  CC: 69-year-old white gentleman being seen in follow-up.  Since I seen him he went to [facility] and had his leaking endoluminal graft addressed.  He tells me his shortness of breath and activity but also has gained weight.  He tells me has significant back discomfort and cannot exercise.  There is no chest pains, palpitations, syncope, dizziness, PND, orthopnea or peripheral edema.    Medications  Current Outpatient Medications   Medication Sig Dispense Refill     acetaminophen (TYLENOL) 325 MG tablet Take 2 tablets (650 mg) by mouth every 4 hours as needed for mild pain       albuterol (PROAIR HFA;PROVENTIL HFA;VENTOLIN HFA) 90 mcg/actuation inhaler Inhale 2 puffs into the lungs every 4 hours as needed       atorvastatin (LIPITOR) 80 MG tablet Take 1 tablet (80 mg) by mouth daily 90 tablet 3     blood glucose test strips [BLOOD GLUCOSE TEST STRIPS] Use 1 each As Directed 2 (two) times a day. Dispense brand per patient's insurance at pharmacy discretion. 180 strip 1     clopidogrel (PLAVIX) 75 MG tablet Take 1 tablet (75 mg) by mouth daily Dose to  "start tomorrow. 90 tablet 3     [START ON 3/28/2023] diltiazem ER COATED BEADS (CARDIZEM CD/CARTIA XT) 180 MG 24 hr capsule Take 1 capsule (180 mg) by mouth daily 90 capsule 4     folic acid (FOLVITE) 1 MG tablet Take 1 mg by mouth daily Do not take on methotrexate days (Tuesday)  4     generic lancets [GENERIC LANCETS] Use 1 each As Directed 4 (four) times a day. 100 each 6     hydrochlorothiazide (HYDRODIURIL) 12.5 MG tablet TAKE 1 TABLET(12.5 MG) BY MOUTH DAILY 90 tablet 1     losartan (COZAAR) 100 MG tablet TAKE 1 TABLET(100 MG) BY MOUTH DAILY 90 tablet 1     methotrexate 2.5 MG tablet Take 12.5 mg by mouth once a week Tuesdays       metoprolol succinate (TOPROL-XL) 100 MG 24 hr tablet [METOPROLOL SUCCINATE (TOPROL-XL) 100 MG 24 HR TABLET] TAKE 1 TABLET(100 MG) BY MOUTH DAILY 90 tablet 2     nitroGLYcerin (NITROSTAT) 0.4 MG sublingual tablet One tablet under the tongue every 5 minutes if needed for chest pain. May repeat every 5 minutes for a maximum of 3 doses in 15 minutes\" 25 tablet 3     tamsulosin (FLOMAX) 0.4 mg cap [TAMSULOSIN (FLOMAX) 0.4 MG CAP] Take 1 capsule (0.4 mg total) by mouth 2 (two) times a day. 180 capsule 0     ketoconazole (NIZORAL) 2 % external shampoo          Objective  BP (!) 141/62 (BP Location: Left arm, Patient Position: Sitting, Cuff Size: Adult Large)   Pulse 56   Resp 28   Ht 1.778 m (5' 10\")   Wt 112.2 kg (247 lb 4.8 oz)   BMI 35.48 kg/m      General Appearance:    Alert, cooperative, no distress, appears stated age   Head:    Normocephalic, without obvious abnormality, atraumatic   Throat:   Lips, mucosa, and tongue normal; teeth and gums normal   Neck:   Supple, symmetrical, trachea midline, no adenopathy;        thyroid:  No enlargement/tenderness/nodules; no carotid    bruit or JVD   Back:     Symmetric, no curvature, ROM normal, no CVA tenderness   Lungs:     Clear to auscultation bilaterally, respirations unlabored   Chest wall:    No tenderness or deformity   Heart:    " Regular rate and rhythm, S1 and S2 normal, no murmur, rub   or gallop   Abdomen:     Soft, non-tender, bowel sounds active all four quadrants,     no masses, no organomegaly   Extremities:   Normal, atraumatic, no cyanosis or edema   Pulses:   2+ and symmetric all upper extremities, 1+ lower extremities   Skin:   Skin color, texture, turgor normal, no rashes or lesions     Results    Lab Results personally reviewed   Lab Results   Component Value Date    CHOL 120 02/21/2022    CHOL 132 01/02/2022     Lab Results   Component Value Date    HDL 27 (L) 02/21/2022    HDL 27 (L) 01/02/2022     No components found for: LDLCALC  Lab Results   Component Value Date    TRIG 331 (H) 02/21/2022    TRIG 241 (H) 01/02/2022     Lab Results   Component Value Date    WBC 8.0 12/17/2022    HGB 12.8 (L) 12/17/2022    HCT 38.1 (L) 12/17/2022     12/17/2022     Lab Results   Component Value Date    BUN 16 12/17/2022     12/17/2022    CO2 22 12/17/2022

## 2023-02-28 NOTE — PATIENT INSTRUCTIONS
Mr Koko Ronquillo,  I enjoyed visiting with you again today.  I am glad to hear you are doing well.  Per our conversation if the shortness of breath keeps up let me know.  Work on weight loss.  Lastly, given blood pressure when the DILTIAZEM 120 runs out  the 180 and start taking.  I will plan on seeing you 1 year.  Ajay Argueta

## 2023-02-28 NOTE — LETTER
2/28/2023    Jelani Shabazz MD  [facility] [address]  [facility] [address]    RE: Koko Ronquillo       Dear Colleague,     I had the pleasure of seeing K[facility]qu[facility] in the [facility].      [facility]  Heart Care Clinic Follow-up Note    Assessment & Plan        (I25.10,  I25.83) Coronary artery disease due to lipid rich plaque  (primary encounter diagnosis)  Comment: Due to borderline abnormal troponin in 2019 0.35 and another abnormal troponin December 31, 2022 of 0.33 he underwent angiography.  January 3, 2022 angiography showed left main 10% stenosis, proximal LAD 25% followed by mid 25% followed by 90% mid lesion that received a drug-coated stent as well as a second diagonal 90% stenosis.  The circumflex had a proximal 20% lesion, mid 40% lesion, with a third obtuse marginal artery 50% stenosis in the fourth obtuse marginal artery 50% stenosis.  Right coronary artery had a proximal 10% lesion, there is a patent stent, distal right coronary artery 80% stenosis which was intervened  with drug-coated stent as well as a distal 50% lesion.  He had some atypical chest discomfort around December, presented to the hospital, had unremarkable stress nuclear, but still has his shortness of breath.    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  [facility] had repeat intervention, doing better, will be arranging for relook with [place] the next several months.    (I10) Hypertension  Comment: Slightly elevated, I have taken the liberty given his resistant hypertension of increasing Cardizem from 120 to 180.  Currently on diltiazem, losartan, HCTZ, as well as metoprolol.    (E78.00) Pure hypercholesterolemia  Comment: Total cholesterol 120 with an LDL of 27 which is excellent.    (G47.33) SHAWN (obstructive sleep apnea)  Comment: So noted,  work on weight loss.    (E66.01) Class 3 severe obesity due to excess calories without serious comorbidity in adult, unspecified BMI (H)  Comment: Work on weight loss.    (E11.9) Type 2 diabetes mellitus treated without insulin (H)  Comment: Hemoglobin A1c 7.1 and defer to primary.    (I48.0) Paroxysmal atrial fibrillation (H)  Comment: Paroxysmal, possibly due to sleep apnea, no recent episodes since before 2016.  Currently not on anticoagulation given this, repeat event monitor 2021 showed no major arrhythmia, and did have one episode lasting maybe 5 minutes of palpitations, this is maybe once a month.  Hold off on further evaluation just yet.  And if recurrent atrial fibrillation seen start Eliquis 5 mg p.o. twice daily given his CHADS 2 VASC score of 3.     (M54.50,  G89.29) Chronic bilateral low back pain without sciatica  Comment: Biggest issue, defer to primary.    Plan  1.  Work on weight loss.  2.  Increase Cardizem from 120 up to 180.  3.  Shortness of breath persist let us know and we will arrange for angiography.  4.  Follow-up with me in 1 year or sooner if needed.    Subjective  CC: 69-year-old white gentleman being seen in follow-up.  Since I seen him he went to [facility] and had his leaking endoluminal graft addressed.  He tells me his shortness of breath and activity but also has gained weight.  He tells me has significant back discomfort and cannot exercise.  There is no chest pains, palpitations, syncope, dizziness, PND, orthopnea or peripheral edema.    Medications  Current Outpatient Medications   Medication Sig Dispense Refill     acetaminophen (TYLENOL) 325 MG tablet Take 2 tablets (650 mg) by mouth every 4 hours as needed for mild pain       albuterol (PROAIR HFA;PROVENTIL HFA;VENTOLIN HFA) 90 mcg/actuation inhaler Inhale 2 puffs into the lungs every 4 hours as needed       atorvastatin (LIPITOR) 80 MG tablet Take 1 tablet (80 mg) by mouth daily 90 tablet 3     blood glucose test strips  "[BLOOD GLUCOSE TEST STRIPS] Use 1 each As Directed 2 (two) times a day. Dispense brand per patient's insurance at pharmacy discretion. 180 strip 1     clopidogrel (PLAVIX) 75 MG tablet Take 1 tablet (75 mg) by mouth daily Dose to start tomorrow. 90 tablet 3     [START ON 3/28/2023] diltiazem ER COATED BEADS (CARDIZEM CD/CARTIA XT) 180 MG 24 hr capsule Take 1 capsule (180 mg) by mouth daily 90 capsule 4     folic acid (FOLVITE) 1 MG tablet Take 1 mg by mouth daily Do not take on methotrexate days (Tuesday)  4     generic lancets [GENERIC LANCETS] Use 1 each As Directed 4 (four) times a day. 100 each 6     hydrochlorothiazide (HYDRODIURIL) 12.5 MG tablet TAKE 1 TABLET(12.5 MG) BY MOUTH DAILY 90 tablet 1     losartan (COZAAR) 100 MG tablet TAKE 1 TABLET(100 MG) BY MOUTH DAILY 90 tablet 1     methotrexate 2.5 MG tablet Take 12.5 mg by mouth once a week Tuesdays       metoprolol succinate (TOPROL-XL) 100 MG 24 hr tablet [METOPROLOL SUCCINATE (TOPROL-XL) 100 MG 24 HR TABLET] TAKE 1 TABLET(100 MG) BY MOUTH DAILY 90 tablet 2     nitroGLYcerin (NITROSTAT) 0.4 MG sublingual tablet One tablet under the tongue every 5 minutes if needed for chest pain. May repeat every 5 minutes for a maximum of 3 doses in 15 minutes\" 25 tablet 3     tamsulosin (FLOMAX) 0.4 mg cap [TAMSULOSIN (FLOMAX) 0.4 MG CAP] Take 1 capsule (0.4 mg total) by mouth 2 (two) times a day. 180 capsule 0     ketoconazole (NIZORAL) 2 % external shampoo          Objective  BP (!) 141/62 (BP Location: Left arm, Patient Position: Sitting, Cuff Size: Adult Large)   Pulse 56   Resp 28   Ht 1.778 m (5' 10\")   Wt 112.2 kg (247 lb 4.8 oz)   BMI 35.48 kg/m      General Appearance:    Alert, cooperative, no distress, appears stated age   Head:    Normocephalic, without obvious abnormality, atraumatic   Throat:   Lips, mucosa, and tongue normal; teeth and gums normal   Neck:   Supple, symmetrical, trachea midline, no adenopathy;        thyroid:  No " enlargement/tenderness/nodules; no carotid    bruit or JVD   Back:     Symmetric, no curvature, ROM normal, no CVA tenderness   Lungs:     Clear to auscultation bilaterally, respirations unlabored   Chest wall:    No tenderness or deformity   Heart:    Regular rate and rhythm, S1 and S2 normal, no murmur, rub   or gallop   Abdomen:     Soft, non-tender, bowel sounds active all four quadrants,     no masses, no organomegaly   Extremities:   Normal, atraumatic, no cyanosis or edema   Pulses:   2+ and symmetric all upper extremities, 1+ lower extremities   Skin:   Skin color, texture, turgor normal, no rashes or lesions     Results    Lab Results personally reviewed   Lab Results   Component Value Date    CHOL 120 02/21/2022    CHOL 132 01/02/2022     Lab Results   Component Value Date    HDL 27 (L) 02/21/2022    HDL 27 (L) 01/02/2022     No components found for: LDLCALC  Lab Results   Component Value Date    TRIG 331 (H) 02/21/2022    TRIG 241 (H) 01/02/2022     Lab Results   Component Value Date    WBC 8.0 12/17/2022    HGB 12.8 (L) 12/17/2022    HCT 38.1 (L) 12/17/2022     12/17/2022     Lab Results   Component Value Date    BUN 16 12/17/2022     12/17/2022    CO2 22 12/17/2022               Thank you for allowing me to participate in the care of your patient.      Sincerely,     PARVIZ ARGUETA MD     [facility]  cc:   Parviz Argueta MD  [address], [address]  [place] [id number]

## 2023-03-22 NOTE — TELEPHONE ENCOUNTER
Central PA team  [phone]  Pool: HE PA MED ([id number])          PA has been initiated.       PA form completed and faxed insurance via Cover My Meds     Key:  [id number]     Medication:  ACCU-CHEK STRIPS    Insurance:  [employer]        Response will be received via fax and may take up to 5-10 business days depending on plan   

## 2023-04-14 ENCOUNTER — TELEPHONE (OUTPATIENT)
Dept: CARDIOLOGY | Facility: CLINIC | Age: 78
End: 2023-04-14
Payer: COMMERCIAL

## 2023-04-14 NOTE — TELEPHONE ENCOUNTER
"Called Koko to address his concerns. He reports since increasing his Diltiazem ER to 180 mg from 120 mg after OV 2/28/2023, he has felt increased fatigue. He reports that he is just tired all the time and overall feels \"very lazy\". He denies any dizziness or lightheadedness. He has not checked a pulse or blood pressure since going up on dosage. He requests to go back to previous dose regardless. Will route. [place] [place]        [place]. Argueta,  See above. Koko requests to go back to 120 mg of Dilt ER.   Thanks,  Mal   "

## 2023-04-14 NOTE — TELEPHONE ENCOUNTER
[facility] Call Center    Phone Message    May a detailed message be left on voicemail: yes     Reason for Call: Medication Question or concern regarding medication   Prescription Clarification  Name of Medication: diltiazem ER COATED BEADS (CARDIZEM CD/CARTIA XT) 180 MG 24 hr capsule  Prescribing Provider: Dr. Argueta   Pharmacy: [facility][id number] [address] AT [place] [address]   What on the order needs clarification? Pt states his dosage was recently changed and now he's feeling lazy and tired and wants to know if he should go back to regular dose, please reach out to pt at [phone] to discuss.          Action Taken: Other: Cardiology    Travel Screening: Not Applicable     Thank you!  [facility]

## 2023-04-17 NOTE — TELEPHONE ENCOUNTER
Called Koko and outlined below message from [facility]. He states that he has a BP cuff and will start monitoring his BP again before going down to 120 mg. He knows the importance of good BP control. He will call back directly with x1 week worth of values and he was instructed to keep a log along with any symptoms he was having that day. [place] [place]

## 2023-04-17 NOTE — TELEPHONE ENCOUNTER
----- Message -----  From: Ajay Argueta MD  Sent: 4/14/2023   4:54 PM CDT  To: Audrey Sepulveda RN    I saw him in February at which point time I increased the medicine, I can only assume he has been on the higher dose for the last 2 months?  If it has been less than a week or 2 higher dose I would ask him to try 1 more week, if however it has been longer than that, does he have blood pressures at home that show more like 120s over 80s?  It is of course his choice but unless the blood pressures are that low I would suggest continuing it but if he does have significant side effects I would agree with him lowering it back down to 120.  In addition, he should understand that his endoluminal graft leak will only get worse with higher blood pressures.  LF

## 2023-04-19 DIAGNOSIS — E78.2 MIXED HYPERLIPIDEMIA: ICD-10-CM

## 2023-04-19 DIAGNOSIS — I21.4 NSTEMI (NON-ST ELEVATED MYOCARDIAL INFARCTION) (H): ICD-10-CM

## 2023-04-19 RX ORDER — CLOPIDOGREL BISULFATE 75 MG/1
75 TABLET ORAL DAILY
Qty: 90 TABLET | Refills: 3 | Status: SHIPPED | OUTPATIENT
Start: 2023-04-19 | End: 2024-03-21

## 2023-05-07 ENCOUNTER — HEALTH MAINTENANCE LETTER (OUTPATIENT)
Age: 78
End: 2023-05-07

## 2023-07-30 ENCOUNTER — HEALTH MAINTENANCE LETTER (OUTPATIENT)
Age: 78
End: 2023-07-30

## 2023-12-17 ENCOUNTER — HEALTH MAINTENANCE LETTER (OUTPATIENT)
Age: 78
End: 2023-12-17

## 2024-02-05 ENCOUNTER — APPOINTMENT (OUTPATIENT)
Dept: CT IMAGING | Facility: CLINIC | Age: 79
End: 2024-02-05
Attending: EMERGENCY MEDICINE
Payer: COMMERCIAL

## 2024-02-05 ENCOUNTER — APPOINTMENT (OUTPATIENT)
Dept: MRI IMAGING | Facility: CLINIC | Age: 79
End: 2024-02-05
Attending: EMERGENCY MEDICINE
Payer: COMMERCIAL

## 2024-02-05 ENCOUNTER — HOSPITAL ENCOUNTER (OUTPATIENT)
Facility: CLINIC | Age: 79
Setting detail: OBSERVATION
Discharge: HOME | End: 2024-02-06
Attending: EMERGENCY MEDICINE
Payer: COMMERCIAL

## 2024-02-05 DIAGNOSIS — G47.33 OSA (OBSTRUCTIVE SLEEP APNEA): Primary | ICD-10-CM

## 2024-02-05 DIAGNOSIS — G45.9 TIA (TRANSIENT ISCHEMIC ATTACK): ICD-10-CM

## 2024-02-05 DIAGNOSIS — R47.01 APHASIA: ICD-10-CM

## 2024-02-05 LAB
ANION GAP SERPL CALCULATED.3IONS-SCNC: 11 MMOL/L (ref 7–15)
APTT PPP: 28 SECONDS (ref 22–38)
ATRIAL RATE - MUSE: 56 BPM
BASOPHILS # BLD AUTO: 0.1 10E3/UL (ref 0–0.2)
BASOPHILS NFR BLD AUTO: 1 %
BUN SERPL-MCNC: 11.9 MG/DL (ref 8–23)
CALCIUM SERPL-MCNC: 9.3 MG/DL (ref 8.8–10.2)
CHLORIDE SERPL-SCNC: 105 MMOL/L (ref 98–107)
CREAT SERPL-MCNC: 1.13 MG/DL (ref 0.67–1.17)
DEPRECATED HCO3 PLAS-SCNC: 24 MMOL/L (ref 22–29)
DIASTOLIC BLOOD PRESSURE - MUSE: NORMAL MMHG
EGFRCR SERPLBLD CKD-EPI 2021: 66 ML/MIN/1.73M2
EOSINOPHIL # BLD AUTO: 0.2 10E3/UL (ref 0–0.7)
EOSINOPHIL NFR BLD AUTO: 3 %
ERYTHROCYTE [DISTWIDTH] IN BLOOD BY AUTOMATED COUNT: 14.6 % (ref 10–15)
GLUCOSE BLDC GLUCOMTR-MCNC: 117 MG/DL (ref 70–99)
GLUCOSE BLDC GLUCOMTR-MCNC: 91 MG/DL (ref 70–99)
GLUCOSE SERPL-MCNC: 96 MG/DL (ref 70–99)
HBA1C MFR BLD: 6.2 %
HCT VFR BLD AUTO: 39.8 % (ref 40–53)
HGB BLD-MCNC: 13.5 G/DL (ref 13.3–17.7)
IMM GRANULOCYTES # BLD: 0 10E3/UL
IMM GRANULOCYTES NFR BLD: 0 %
INR PPP: 1.08 (ref 0.85–1.15)
INTERPRETATION ECG - MUSE: NORMAL
LYMPHOCYTES # BLD AUTO: 1.8 10E3/UL (ref 0.8–5.3)
LYMPHOCYTES NFR BLD AUTO: 24 %
MCH RBC QN AUTO: 31.2 PG (ref 26.5–33)
MCHC RBC AUTO-ENTMCNC: 33.9 G/DL (ref 31.5–36.5)
MCV RBC AUTO: 92 FL (ref 78–100)
MONOCYTES # BLD AUTO: 0.8 10E3/UL (ref 0–1.3)
MONOCYTES NFR BLD AUTO: 10 %
NEUTROPHILS # BLD AUTO: 4.8 10E3/UL (ref 1.6–8.3)
NEUTROPHILS NFR BLD AUTO: 62 %
NRBC # BLD AUTO: 0 10E3/UL
NRBC BLD AUTO-RTO: 0 /100
P AXIS - MUSE: 47 DEGREES
PLATELET # BLD AUTO: 224 10E3/UL (ref 150–450)
POTASSIUM SERPL-SCNC: 4.4 MMOL/L (ref 3.4–5.3)
PR INTERVAL - MUSE: 198 MS
QRS DURATION - MUSE: 84 MS
QT - MUSE: 448 MS
QTC - MUSE: 432 MS
R AXIS - MUSE: 30 DEGREES
RBC # BLD AUTO: 4.33 10E6/UL (ref 4.4–5.9)
SODIUM SERPL-SCNC: 140 MMOL/L (ref 135–145)
SYSTOLIC BLOOD PRESSURE - MUSE: NORMAL MMHG
T AXIS - MUSE: 26 DEGREES
TROPONIN T SERPL HS-MCNC: 15 NG/L
VENTRICULAR RATE- MUSE: 56 BPM
WBC # BLD AUTO: 7.8 10E3/UL (ref 4–11)

## 2024-02-05 PROCEDURE — 250N000011 HC RX IP 250 OP 636: Performed by: EMERGENCY MEDICINE

## 2024-02-05 PROCEDURE — 0042T CT HEAD PERFUSION W CONTRAST: CPT

## 2024-02-05 PROCEDURE — 36415 COLL VENOUS BLD VENIPUNCTURE: CPT | Performed by: EMERGENCY MEDICINE

## 2024-02-05 PROCEDURE — G0378 HOSPITAL OBSERVATION PER HR: HCPCS

## 2024-02-05 PROCEDURE — 85730 THROMBOPLASTIN TIME PARTIAL: CPT | Performed by: EMERGENCY MEDICINE

## 2024-02-05 PROCEDURE — 85025 COMPLETE CBC W/AUTO DIFF WBC: CPT | Performed by: EMERGENCY MEDICINE

## 2024-02-05 PROCEDURE — 99285 EMERGENCY DEPT VISIT HI MDM: CPT

## 2024-02-05 PROCEDURE — 80048 BASIC METABOLIC PNL TOTAL CA: CPT | Performed by: EMERGENCY MEDICINE

## 2024-02-05 PROCEDURE — 250N000013 HC RX MED GY IP 250 OP 250 PS 637: Performed by: EMERGENCY MEDICINE

## 2024-02-05 PROCEDURE — 83036 HEMOGLOBIN GLYCOSYLATED A1C: CPT | Performed by: STUDENT IN AN ORGANIZED HEALTH CARE EDUCATION/TRAINING PROGRAM

## 2024-02-05 PROCEDURE — 80061 LIPID PANEL: CPT | Performed by: STUDENT IN AN ORGANIZED HEALTH CARE EDUCATION/TRAINING PROGRAM

## 2024-02-05 PROCEDURE — 82962 GLUCOSE BLOOD TEST: CPT

## 2024-02-05 PROCEDURE — 250N000013 HC RX MED GY IP 250 OP 250 PS 637: Performed by: STUDENT IN AN ORGANIZED HEALTH CARE EDUCATION/TRAINING PROGRAM

## 2024-02-05 PROCEDURE — 70549 MR ANGIOGRAPH NECK W/O&W/DYE: CPT

## 2024-02-05 PROCEDURE — 99222 1ST HOSP IP/OBS MODERATE 55: CPT | Performed by: STUDENT IN AN ORGANIZED HEALTH CARE EDUCATION/TRAINING PROGRAM

## 2024-02-05 PROCEDURE — 70450 CT HEAD/BRAIN W/O DYE: CPT

## 2024-02-05 PROCEDURE — 70496 CT ANGIOGRAPHY HEAD: CPT

## 2024-02-05 PROCEDURE — 84484 ASSAY OF TROPONIN QUANT: CPT | Performed by: EMERGENCY MEDICINE

## 2024-02-05 PROCEDURE — 70553 MRI BRAIN STEM W/O & W/DYE: CPT

## 2024-02-05 PROCEDURE — 70544 MR ANGIOGRAPHY HEAD W/O DYE: CPT

## 2024-02-05 PROCEDURE — 93005 ELECTROCARDIOGRAM TRACING: CPT | Performed by: EMERGENCY MEDICINE

## 2024-02-05 PROCEDURE — 99207 PR NO CHARGE LOS: CPT | Performed by: PHYSICIAN ASSISTANT

## 2024-02-05 PROCEDURE — 85610 PROTHROMBIN TIME: CPT | Performed by: EMERGENCY MEDICINE

## 2024-02-05 RX ORDER — NICOTINE POLACRILEX 4 MG
15-30 LOZENGE BUCCAL
Status: DISCONTINUED | OUTPATIENT
Start: 2024-02-05 | End: 2024-02-06 | Stop reason: HOSPADM

## 2024-02-05 RX ORDER — METOPROLOL SUCCINATE 50 MG/1
50 TABLET, EXTENDED RELEASE ORAL DAILY
COMMUNITY
Start: 2023-12-07

## 2024-02-05 RX ORDER — CLOPIDOGREL BISULFATE 75 MG/1
75 TABLET ORAL DAILY
Status: DISCONTINUED | OUTPATIENT
Start: 2024-02-06 | End: 2024-02-06 | Stop reason: HOSPADM

## 2024-02-05 RX ORDER — ASPIRIN 325 MG
325 TABLET ORAL ONCE
Status: COMPLETED | OUTPATIENT
Start: 2024-02-05 | End: 2024-02-05

## 2024-02-05 RX ORDER — ONDANSETRON 2 MG/ML
4 INJECTION INTRAMUSCULAR; INTRAVENOUS EVERY 6 HOURS PRN
Status: DISCONTINUED | OUTPATIENT
Start: 2024-02-05 | End: 2024-02-06 | Stop reason: HOSPADM

## 2024-02-05 RX ORDER — NITROGLYCERIN 0.4 MG/1
0.4 TABLET SUBLINGUAL EVERY 5 MIN PRN
Status: DISCONTINUED | OUTPATIENT
Start: 2024-02-05 | End: 2024-02-06 | Stop reason: HOSPADM

## 2024-02-05 RX ORDER — LIDOCAINE 40 MG/G
CREAM TOPICAL
Status: DISCONTINUED | OUTPATIENT
Start: 2024-02-05 | End: 2024-02-06 | Stop reason: HOSPADM

## 2024-02-05 RX ORDER — ONDANSETRON 4 MG/1
4 TABLET, ORALLY DISINTEGRATING ORAL EVERY 6 HOURS PRN
Status: DISCONTINUED | OUTPATIENT
Start: 2024-02-05 | End: 2024-02-06 | Stop reason: HOSPADM

## 2024-02-05 RX ORDER — DEXTROSE MONOHYDRATE 25 G/50ML
25-50 INJECTION, SOLUTION INTRAVENOUS
Status: DISCONTINUED | OUTPATIENT
Start: 2024-02-05 | End: 2024-02-06 | Stop reason: HOSPADM

## 2024-02-05 RX ORDER — ALBUTEROL SULFATE 90 UG/1
2 AEROSOL, METERED RESPIRATORY (INHALATION) EVERY 4 HOURS PRN
Status: DISCONTINUED | OUTPATIENT
Start: 2024-02-05 | End: 2024-02-06 | Stop reason: HOSPADM

## 2024-02-05 RX ORDER — PROCHLORPERAZINE 25 MG
12.5 SUPPOSITORY, RECTAL RECTAL EVERY 12 HOURS PRN
Status: DISCONTINUED | OUTPATIENT
Start: 2024-02-05 | End: 2024-02-06 | Stop reason: HOSPADM

## 2024-02-05 RX ORDER — IOPAMIDOL 755 MG/ML
100 INJECTION, SOLUTION INTRAVASCULAR ONCE
Status: COMPLETED | OUTPATIENT
Start: 2024-02-05 | End: 2024-02-05

## 2024-02-05 RX ORDER — PROCHLORPERAZINE MALEATE 5 MG
5 TABLET ORAL EVERY 6 HOURS PRN
Status: DISCONTINUED | OUTPATIENT
Start: 2024-02-05 | End: 2024-02-06 | Stop reason: HOSPADM

## 2024-02-05 RX ORDER — ATORVASTATIN CALCIUM 40 MG/1
80 TABLET, FILM COATED ORAL AT BEDTIME
Status: DISCONTINUED | OUTPATIENT
Start: 2024-02-05 | End: 2024-02-06 | Stop reason: HOSPADM

## 2024-02-05 RX ORDER — IOPAMIDOL 755 MG/ML
75 INJECTION, SOLUTION INTRAVASCULAR ONCE
Status: COMPLETED | OUTPATIENT
Start: 2024-02-05 | End: 2024-02-05

## 2024-02-05 RX ADMIN — IOPAMIDOL 100 ML: 755 INJECTION, SOLUTION INTRAVENOUS at 14:27

## 2024-02-05 RX ADMIN — IOPAMIDOL 75 ML: 755 INJECTION, SOLUTION INTRAVENOUS at 14:19

## 2024-02-05 RX ADMIN — ASPIRIN 325 MG ORAL TABLET 325 MG: 325 PILL ORAL at 15:36

## 2024-02-05 RX ADMIN — ATORVASTATIN CALCIUM 80 MG: 40 TABLET, FILM COATED ORAL at 22:49

## 2024-02-05 ASSESSMENT — ACTIVITIES OF DAILY LIVING (ADL)
ADLS_ACUITY_SCORE: 35
DEPENDENT_IADLS:: INDEPENDENT
ADLS_ACUITY_SCORE: 35
ADLS_ACUITY_SCORE: 35

## 2024-02-05 NOTE — PHARMACY-ADMISSION MEDICATION HISTORY
Pharmacist Admission Medication History    Admission medication history is complete. The information provided in this note is only as accurate as the sources available at the time of the update.    Information Source(s): Patient and CareEverywhere/SureScripts via in-person    Pertinent Information: Patient said he is okay to resume his meds once home tomorrow - if he discharges early (told observation possibly until noon 2/6/24).     Changes made to PTA medication list:  Added: None  Deleted: acetaminophen, hydrochlorothiazide   Changed: methotrexate 4 tabs = 10 mg not 5 tabs = 12.5 mg, metoprolol XL 50 mg daily not 100 mg daily, added dosing to ketoconazole shampoo,     Allergies reviewed with patient and updates made in EHR: yes    Medication History Completed By: Paty Cote [facility] 2/5/2024 3:47 PM    PTA Med List   Medication Sig Note Last Dose    albuterol (PROAIR HFA;PROVENTIL HFA;VENTOLIN HFA) 90 mcg/actuation inhaler Inhale 2 puffs into the lungs every 4 hours as needed  More than a month    atorvastatin (LIPITOR) 80 MG tablet Take 1 tablet (80 mg) by mouth daily 2/5/2024: Takes at bedtime. 2/4/2024 at takes qhs    blood glucose test strips [BLOOD GLUCOSE TEST STRIPS] Use 1 each As Directed 2 (two) times a day. Dispense brand per patient's insurance at pharmacy discretion.  Unknown    clopidogrel (PLAVIX) 75 MG tablet Take 1 tablet (75 mg) by mouth daily Dose to start tomorrow.  2/5/2024 at am    diltiazem ER COATED BEADS (CARDIZEM CD/CARTIA XT) 180 MG 24 hr capsule Take 1 capsule (180 mg) by mouth daily  2/5/2024 at am    folic acid (FOLVITE) 1 MG tablet Take 1 mg by mouth daily Do not take on methotrexate days (Tuesday)  2/5/2024 at am    generic lancets [GENERIC LANCETS] Use 1 each As Directed 4 (four) times a day.  Unknown    ketoconazole (NIZORAL) 2 % external shampoo Apply topically twice a week  Past Week    losartan (COZAAR) 100 MG tablet TAKE 1 TABLET(100 MG) BY MOUTH DAILY  2/5/2024 at am     "methotrexate 2.5 MG tablet Take 10 mg by mouth once a week 4 tabs once weekly on Tuesdays 1/30/2024    metoprolol succinate ER (TOPROL XL) 50 MG 24 hr tablet Take 50 mg by mouth daily  2/5/2024 at am    nitroGLYcerin (NITROSTAT) 0.4 MG sublingual tablet One tablet under the tongue every 5 minutes if needed for chest pain. May repeat every 5 minutes for a maximum of 3 doses in 15 minutes\"  Unknown    tamsulosin (FLOMAX) 0.4 mg cap [TAMSULOSIN (FLOMAX) 0.4 MG CAP] Take 1 capsule (0.4 mg total) by mouth 2 (two) times a day.  2/5/2024 at am       "

## 2024-02-05 NOTE — ED TRIAGE NOTES
"Pt presents to the ED with c/o an episode of aphagia about one hour ago. Pt states that it lasted about an hour, was not able to say simple words while on the phone. Pt denies any other symptoms. Reports that his balance is off, but states it has been that way \"for a while\". Denies any vision changes, no sensation changes. No weakness noted upon exam. Denies any hx of strokes. Not on blood thinners.      Triage Assessment (Adult)       Row Name 02/05/24 3902          Triage Assessment    Airway WDL WDL        Respiratory WDL    Respiratory WDL WDL        Skin Circulation/Temperature WDL    Skin Circulation/Temperature WDL WDL        Cardiac WDL    Cardiac WDL WDL        Peripheral/Neurovascular WDL    Peripheral Neurovascular WDL WDL        Cognitive/Neuro/Behavioral WDL    Cognitive/Neuro/Behavioral WDL WDL                     "

## 2024-02-05 NOTE — H&P
[facility]    History and Physical - Hospitalist Service       Date of Admission:  2/5/2024    Assessment & Plan      Koko Ronquillo is a 78 year old male admitted on 2/5/2024. He has a pmhx of hyperlipidemia, cad s/p RANCHO in both right coronary and LAD on plavix in Jan 2022, atrial fibrillation, bladder cancer, hypertension, anxiety, DM2, SHAWN, rheumatoid arthritis. Recently here 12/2022 for chest pain that resolved. Presents 2/5/2024 for concern of stroke.     Presentation of aphasia at 1pm when he received a call and could not reply to the caller, then he proceeded to google stroke symptoms but could not read his phone; notably within the hour or so of his symptoms beginning they completely resolved. He did have a bilateral frontal headache during this time but that has resolved too when reached ER. Only feeling slightly dizzy on admission, though ambulated in room. Of note this history is different than what the ER sign-out and Neuro note states: last known well 8am, 10am  trouble reading and speaking symptoms started doing computer work. Pt reaffirmed to writer x3 times that their history is inaccurate and as aforementioned symptoms started at 1pm when he picked up phone call.     On admit is vitally 171/77 o/w stable, labs include normal BMP, troponin neg, no leukocytosis and hgb 13.5. CT head showed presumed chronic microvascular ischemic changes but no perfusion defects on CT and CTA showed plaque in internal carotids mild narrowing of left vertebral, V2 and V3 segment of right vertebral artery. Neurology recommended MRI. Utilized order set and admitted to hospital w/ stroke neuro consult. Given aspirin. On admission exam no neurologic deficits, no weakness.      -----   Aphasia  Difficulty reading, bilateral frontal headache  Concern for TIA, all symptoms resolved by admission  A- symptoms resolved. Appreciate neuro following and formal consult on 2/6. MRI as per their recs.  P:    - admit to inpatient, utilized Ischemic Stroke Routine Admission   - echocardiogram ordered  -Permissive hypertension -goal SBP < 220 mmHg, explained to patient  -Neurochecks and Vital Signs every 4 hours    - Daily aspirin 325 mg for secondary stroke prevention  - cholesterol control optimization  - Telemetry, EKG  - Bedside Glucose Monitoring  - Nutrition: per RN   - bedside swallow before starting conditional diet  - A1c, Lipid Panel, Troponin x 3  - PT/OT/SLP  - Stroke Education/Stroke Class per [facility]  - Depression Screen  - treat/screen SHAWN    Coronary artery disease  RANCHO stents on plavix  Recently here dec 2022 for chest pain that resolved  Negative stress test in March 2022  No chest pain on admit      Essential hypertension  Permissive hypertension, hold losartan and metoprolol and diltiazem   Explained to patient who concurs     Bph  Hold flomax, permissive htn    Non-insulin-dependent diabetes  Not on medication at this time  Recommend glucose control to minimize recurrent cva or tia event  Ldssi and hypoglycemia protocol     Rheumatoid Arthritis  Stable, but hold methotrexate while here   Monitor clinically     Paroxysmal atrial fibrillation, not on anticoagulation  On plavix, given asa for stroke; he took plavix on 2/5 PTA  continue  Appreciate neurology follow up and defer further modification to them     Obstructive sleep apnea  cpap          Diet: NPO for Medical/Clinical Reasons Except for: No Exceptions  Combination Diet    DVT Prophylaxis: Pneumatic Compression Devices  Snider Catheter: Not present  Lines: None     Cardiac Monitoring: ACTIVE order. Indication: Stroke, acute (48 hours)  Code Status: Full Code     Clinically Significant Risk Factors Present on Admission                 # Drug Induced Platelet Defect: home medication list includes an antiplatelet medication   # Hypertension: Noted on problem list                 Disposition Plan      Expected Discharge Date:  02/06/2024                  Lucio Barnes MD  Hospitalist Service  [facility]  Securely message with [employer] (more info)  Text page via [email or web address] Paging/Directory     ______________________________________________________________________    Chief Complaint   I could talk when I got a phone call around 1pm     History is obtained from the patient and prior from ER physician     History of Present Illness   Koko Ronquillo is a 78 year old male who has a pmhx of hyperlipidemia, cad s/p RANCHO in both right coronary and LAD on plavix in Jan 2022, atrial fibrillation, bladder cancer, hypertension, anxiety, DM2, SHAWN, rheumatoid arthritis. Recently here 12/2022 for chest pain that resolved. Presents 2/5/2024 for concern of stroke.     Presentation of aphasia at 1pm when he received a call and could not reply to the caller, then he proceeded to google stroke symptoms but could not read his phone; notably within the hour or so of his symptoms beginning they completely resolved. He did have a bilateral frontal headache during this time but that has resolved too when reached ER. Only feeling slightly dizzy on admission, though ambulated in room. Of note this history is different than what the ER sign-out and Neuro note states: last known well 8am, 10am  trouble reading and speaking symptoms started doing computer work. Pt reaffirmed to writer x3 times that their history is inaccurate and as aforementioned symptoms started at 1pm when he picked up phone call.     On admit is vitally 171/77 o/w stable, labs include normal BMP, troponin neg, no leukocytosis and hgb 13.5. CT head showed presumed chronic microvascular ischemic changes but no perfusion defects on CT and CTA showed plaque in internal carotids mild narrowing of left vertebral, V2 and V3 segment of right vertebral artery. Neurology recommended MRI.    On interview, the patient confirms the aforementioned and also reports the symptoms started at 1pm  as above (again he stated the 8am last know well is inaccurate; he was driving his SO at 8am to airport); no other symptoms noted including no persistent or recurrent headaches, fevers, chills, chest pain or shortness of breath including tachypnea dyspnea or coughs, no abdominal pain, no diarrhea or constipation, no dysuria, no neurologic changes or sensory changes. The pt is Full code. Lives with an SO who is now in [place] for work (hence whey he dropped off at airport at 8), no exposure to sick contacts or children or pets. From [place].        Past Medical History    Past Medical History:   Diagnosis Date    Anxiety     Atrial fibrillation (H)     Chest pain made worse by breathing 4/21/2016    CIS (carcinoma in situ of bladder) 8/17/2017    Fatigue 5/19/2016    Hematuria 3/1/2017    HLD (hyperlipidemia)     HTN (hypertension)     Psoriasis     RA (rheumatoid arthritis) (H)     Sleep apnea     does not use CPAP       Past Surgical History   Past Surgical History:   Procedure Laterality Date    ABDOMINAL AORTIC ANEURYSM REPAIR  1995    CV CORONARY ANGIOGRAM N/A 2/8/2019    Procedure: Coronary Angiogram;  Surgeon: El De Oliveira MD;  Location: [facility] Cath Lab;  Service: Cardiology    CV CORONARY ANGIOGRAM N/A 1/3/2022    Procedure: CV CORONARY ANGIOGRAM;  Surgeon: Hailee Pierce MD;  Location: [facility] CV    CV LEFT HEART CATH N/A 1/3/2022    Procedure: Left Heart Cath;  Surgeon: Hailee Pierce MD;  Location: [facility] CV    CV PCI N/A 1/3/2022    Procedure: Percutaneous Coronary Intervention;  Surgeon: Hailee Pierce MD;  Location: [facility] CV    CYSTOSCOPY, TRANSURETHRAL RESECTION (TUR) TUMOR BLADDER, COMBINED N/A 3/1/2017    Procedure: CYSTOSCOPY BLADDER BIOPSY AND FULGURATION;  Surgeon: Kostas Smith MD;  Location: [facility] OR;  Service:     HERNIA REPAIR      with nesh    IR MISCELLANEOUS PROCEDURE  1/24/2007    IR THORACIC AORTOGRAM  1/24/2007     "OTHER SURGICAL HISTORY      aortic bypass    OTHER SURGICAL HISTORY      nasal cauterization       Prior to Admission Medications   Prior to Admission Medications   Prescriptions Last Dose Informant Patient Reported? Taking?   albuterol (PROAIR HFA;PROVENTIL HFA;VENTOLIN HFA) 90 mcg/actuation inhaler More than a month  Yes Yes   Sig: Inhale 2 puffs into the lungs every 4 hours as needed   atorvastatin (LIPITOR) 80 MG tablet 2/4/2024 at takes qhs  No Yes   Sig: Take 1 tablet (80 mg) by mouth daily   blood glucose test strips Unknown  No Yes   Sig: [BLOOD GLUCOSE TEST STRIPS] Use 1 each As Directed 2 (two) times a day. Dispense brand per patient's insurance at pharmacy discretion.   clopidogrel (PLAVIX) 75 MG tablet 2/5/2024 at am  No Yes   Sig: Take 1 tablet (75 mg) by mouth daily Dose to start tomorrow.   diltiazem ER COATED BEADS (CARDIZEM CD/CARTIA XT) 180 MG 24 hr capsule 2/5/2024 at am  No Yes   Sig: Take 1 capsule (180 mg) by mouth daily   folic acid (FOLVITE) 1 MG tablet 2/5/2024 at am  Yes Yes   Sig: Take 1 mg by mouth daily Do not take on methotrexate days (Tuesday)   generic lancets Unknown  No Yes   Sig: [GENERIC LANCETS] Use 1 each As Directed 4 (four) times a day.   ketoconazole (NIZORAL) 2 % external shampoo Past Week  Yes Yes   Sig: Apply topically twice a week   losartan (COZAAR) 100 MG tablet 2/5/2024 at am  No Yes   Sig: TAKE 1 TABLET(100 MG) BY MOUTH DAILY   methotrexate 2.5 MG tablet 1/30/2024  Yes Yes   Sig: Take 10 mg by mouth once a week 4 tabs once weekly on Tuesdays   metoprolol succinate ER (TOPROL XL) 50 MG 24 hr tablet 2/5/2024 at am  Yes Yes   Sig: Take 50 mg by mouth daily   nitroGLYcerin (NITROSTAT) 0.4 MG sublingual tablet Unknown  No Yes   Sig: One tablet under the tongue every 5 minutes if needed for chest pain. May repeat every 5 minutes for a maximum of 3 doses in 15 minutes\"   tamsulosin (FLOMAX) 0.4 mg cap 2/5/2024 at am  No Yes   Sig: [TAMSULOSIN (FLOMAX) 0.4 MG CAP] Take 1 " capsule (0.4 mg total) by mouth 2 (two) times a day.      Facility-Administered Medications: None        Review of Systems    The 10 point Review of Systems is negative other than noted in the HPI or here.      Social History   I have reviewed this patient's social history and updated it with pertinent information if needed.  Social History     Tobacco Use    Smoking status: Former     Types: Cigarettes     Quit date: 2007     Years since quittin.9    Smokeless tobacco: Never   Vaping Use    Vaping Use: Never used   Substance Use Topics    Alcohol use: Yes     Comment: Alcoholic Drinks/day: occasional    Drug use: No         Allergies   No Known Allergies     Physical Exam   Vital Signs: Temp: 98.3  F (36.8  C)   BP: (!) 171/77 Pulse: 68   Resp: 18 SpO2: 97 % O2 Device: None (Room air)    Weight: 226 lbs 0 oz      GENERAL:  Alert, appears comfortable, in no acute distress, appears stated age   HEAD:  Normocephalic, without obvious abnormality, atraumatic   EYES:  PERRL, conjunctiva/corneas clear, no scleral icterus, EOM's intact   NOSE: Nares normal, septum midline, mucosa normal, no drainage   THROAT: Lips, mucosa, and tongue normal; teeth and gums normal, mouth moist   NECK: Supple, symmetrical, trachea midline   BACK:   Symmetric, no curvature   LUNGS:   Clear to auscultation bilaterally, no rales, rhonchi, or wheezing, symmetric chest rise on inhalation, respirations unlabored, on room air    CHEST WALL:  No tenderness or conspicuous deformity   HEART:  Regular rate and rhythm, S1 and S2 normal, no murmur, rub, or gallop, no conspicuous jugular venous distention noted, peripheral pulses intact    ABDOMEN:   Soft, non-tender, bowel sounds auscultated in all four quadrants, no masses, no organomegaly, no rebound or guarding   EXTREMITIES: Extremities normal, atraumatic, no cyanosis or edema    SKIN: Dry to touch, no exanthems in the visualized areas or erythema   NEURO: Alert, oriented x3--nonfocal,  moves all extremities of own volition; strength remains 5/5 in all 5 limbs, no coordination deficits, no swallowing deficits, speech preserved, sensation intact throughout and symmetric and proprioception preserved, ROM in 4 limbs are WNL, sensation to various stimuli remain intact bilaterally, EOMI equal and PERRL, negative down-going babinski reflexes, deep tendon reflexes: patellar and biceps reflexes are normoreflexive bilaterally, no dysdiadochocinesia, no swallowing issues  or speech issues during our conversation    PSYCH: Cooperative and behavior is appropriate       Medical Decision Making       65 MINUTES SPENT BY ME on the date of service doing chart review, history, exam, documentation & further activities per the note.      Data   ------------------------- PAST 24 HR DATA REVIEWED -----------------------------------------------    I have personally reviewed the following data over the past 24 hrs:    7.8  \   13.5   / 224     140 105 11.9 /  96   4.4 24 1.13 \     Trop: 15 BNP: N/A     INR:  1.08 PTT:  28   D-dimer:  N/A Fibrinogen:  N/A       Imaging results reviewed over the past 24 hrs:   Recent Results (from the past 24 hour(s))   CT Head w/o Contrast    Narrative    EXAM: CT HEAD W/O CONTRAST  LOCATION: [facility]  DATE: 2/5/2024    INDICATION: Code Stroke to evaluate for potential thrombolysis and thrombectomy. PLEASE READ IMMEDIATELY. Aphasia.  COMPARISON: None.  TECHNIQUE: Routine CT Head without IV contrast. Multiplanar reformats. Dose reduction techniques were used.    FINDINGS:  INTRACRANIAL CONTENTS: No intracranial hemorrhage. Mild diffuse cerebral parenchymal volume loss. No midline shift. The basilar cisterns are patent. Mild periventricular and scattered foci of deep white matter hypodensities involving both cerebral   hemispheres. No CT evidence for an acute infarct.    VISUALIZED ORBITS/SINUSES/MASTOIDS: Prior bilateral cataract surgery. Visualized portions of  the orbits are otherwise unremarkable. Mild chronic mucosal thickening of the maxillary sinuses and ethmoid air cells. No middle ear or mastoid effusion.    BONES/SOFT TISSUES: No acute abnormality.      Impression    IMPRESSION:  1.  No intracranial hemorrhage, mass lesions, hydrocephalus or CT evidence for an acute infarct. MRI is more sensitive for the evaluation of acute infarcts.  2.  Mild diffuse cerebral parenchymal volume loss. Presumed chronic hypertensive/microvascular ischemic white matter changes.    Discussed the head CT findings with Dr. Aceves at 1416 hours, 02/05/2024.     CTA Head Neck with Contrast    Narrative    EXAM: CTA HEAD NECK W CONTRAST  LOCATION: [facility]  DATE: 2/5/2024    INDICATION: Code Stroke to evaluate for potential thrombolysis and thrombectomy. PLEASE READ IMMEDIATELY.. Aphasia.  COMPARISON: Head CT from today 02/05/2024  CONTRAST: 75ml Isovue 370  TECHNIQUE: Head and neck CT angiogram with IV contrast. Axial helical CT images of the head and neck vessels obtained during the arterial phase of intravenous contrast administration. Axial 2D reconstructed images and multiplanar 3D MIP reconstructed   images of the head and neck vessels were performed by the technologist. Dose reduction techniques were used. All stenosis measurements made according to NASCET criteria unless otherwise specified.    FINDINGS:   HEAD CTA:  ANTERIOR CIRCULATION: Mild atherosclerotic plaque of the cavernous and supraclinoid internal carotid arteries. The anterior cerebral arteries are patent without hemodynamically significant stenosis. The left middle cerebral artery is patent without   hemodynamically significant stenosis. The right middle cerebral artery is patent without hemodynamically significant stenosis. Standard [place] of [place] anatomy. 2 mm infundibulum at the right middle cerebral artery trifurcation.    POSTERIOR CIRCULATION: Mild to moderate atherosclerotic plaque of  the intracranial left vertebral artery. The right vertebral artery, and the bilateral posterior cerebral arteries are patent without hemodynamically significant stenosis. Dominant right   and smaller left vertebral artery contribute to a normal basilar artery.     DURAL VENOUS SINUSES: The major dural venous sinuses are not well opacified on this exam.    NECK CTA:  RIGHT CAROTID: Mild atherosclerotic plaque of the right carotid bulb and proximal right internal carotid artery. No hemodynamically significant narrowing of the right internal carotid artery based on the massive criteria.    LEFT CAROTID: Mild to moderate atherosclerotic plaque of the distal left common carotid artery and proximal left internal carotid artery. No hemodynamically significant narrowing of the left internal carotid artery based on the NASCET criteria.     VERTEBRAL ARTERIES: Mild narrowing of the origin of the left vertebral artery. Multifocal mild narrowings of the proximal V2 segment of the left vertebral artery. Mild narrowing of the distal V2 and V3 segment of the right vertebral artery. Dominant   right and smaller left vertebral arteries.    AORTIC ARCH: Classic aortic arch anatomy with no significant stenosis at the origin of the great vessels.    NONVASCULAR STRUCTURES: Mild emphysematous changes of the lung apices. Mild to moderate degenerative changes of the cervical spine and upper thoracic spine.      Impression    IMPRESSION:   HEAD CTA:   1.  No hemodynamically significant narrowing of the proximal major intracranial arteries. 2 mm infundibulum at the right middle cerebral artery trifurcation.  2.  Mild atherosclerotic plaque of the cavernous and supraclinoid internal carotid arteries. Mild to moderate atherosclerotic plaque of the intracranial left vertebral artery.     NECK CTA:  1.  Mild plaque at both proximal internal carotid arteries. No hemodynamically significant narrowing of the internal carotid arteries bilaterally  based on the NASCET criteria.  2.  Mild narrowing of the origin of the left vertebral artery. Multifocal mild narrowings of the proximal V2 segment of the left vertebral artery. Mild narrowing of the distal V2 and V3 segment of the right vertebral artery.    Discussed the CTA findings with Dr. Aceves at 1422 hours, 02/05/2024.   CT Head Perfusion w Contrast - For Tier 2 Stroke    Narrative    EXAM: CT HEAD PERFUSION W CONTRAST  LOCATION: [facility]  DATE: 2/5/2024    INDICATION: Code Stroke to evaluate for potential thrombolysis and thrombectomy. Evaluate mismatch between penumbra and core infarct. READ IMMEDIATELY. Aphasia.  COMPARISON: Head and neck CT angiogram 02/05/2024  TECHNIQUE: CT cerebral perfusion was performed utilizing a second contrast bolus. Perfusion data were post processed with generation of standard perfusion maps and estimation of ischemic/infarcted volumes utilizing standard threshold values. Dose   reduction techniques were used. All stenosis measurements made according to NASCET criteria unless otherwise specified.  CONTRAST: 100ml Isovue 370     FINDINGS:   CT PERFUSION:  PERFUSION MAPS: Symmetrical cerebral perfusion. No focal deficits in cerebral blood flow or volume to suggest ischemia/oligemia.    RAPID ANALYSIS:  CBF<30%: 0 mL  Tmax>6sec: 0 mL  Mismatch volume: 0 mL  Mismatch ratio: No      Impression    IMPRESSION: Normal cerebral perfusion.

## 2024-02-05 NOTE — ED PROVIDER NOTES
EMERGENCY DEPARTMENT ENCOUNTER            IMPRESSION:  Aphasia  TIA workup        MEDICAL DECISION MAKING:  It was my pleasure to provide care for Koko Ronquillo who presented for evaluation of speech disturbance.  Last known well was approximately 3 hours prior to presentation.  He noted speech disturbance approximately 2 hours prior to evaluation.  He denies other    On my exam patient is pleasant and cooperative.   Vital signs are normal.  Physical exam notable for normal neurologic.     Tier 2 stroke code was initiated    EKG independently interpreted by myself and does not show arrhythmia or ischemia    Laboratory investigation independently interpreted and notable for normal CBC and chemistry    CT imaging of the head and neck did not show any acute stroke or intracranial hemorrhage.  imaging independently interpreted by myself and did not show evidence of intracranial hemorrhage    Stroke neurology was consulted.  MRI and admission to the hospital recommended.    Patient preferred not to transfer outward    I spoke to hospitalist for admission      =================================================================  CHIEF COMPLAINT:  Chief Complaint   Patient presents with    Aphasia         HPI  Koko Ronquillo is a 78 year old male with multiple stroke risk factors presented for speech disturbance.      REVIEW OF SYSTEMS  Constitutional: Does not report chills, unintentional weight loss or fatigue   Eyes: Does not report visual changes or discharge    HENT: Does not report sore throat, ear pain or neck pain  Respiratory: Does not report cough or shortness of breath    Cardiovascular: Does not report chest pain, palpitations or leg swelling  GI: Does not report abdominal pain, nausea, vomiting, or dark, bloody stools.    : Does not report hematuria, dysuria, or flank pain  Musculoskeletal: Does not report any new musculoskeletal pain or new muscle/joint pains  Skin: Does not report rash or  wound  Neurologic: He reports speech disturbance      Remainder of systems reviewed, unless noted in HPI all others negative.      PAST MEDICAL HISTORY:  Past Medical History:   Diagnosis Date    Anxiety     Atrial fibrillation (H)     Chest pain made worse by breathing 4/21/2016    CIS (carcinoma in situ of bladder) 8/17/2017    Fatigue 5/19/2016    Hematuria 3/1/2017    HLD (hyperlipidemia)     HTN (hypertension)     Psoriasis     RA (rheumatoid arthritis) (H)     Sleep apnea     does not use CPAP       PAST SURGICAL HISTORY:  Past Surgical History:   Procedure Laterality Date    ABDOMINAL AORTIC ANEURYSM REPAIR  1995    CV CORONARY ANGIOGRAM N/A 2/8/2019    Procedure: Coronary Angiogram;  Surgeon: El De Oliveira MD;  Location: [facility] Cath Lab;  Service: Cardiology    CV CORONARY ANGIOGRAM N/A 1/3/2022    Procedure: CV CORONARY ANGIOGRAM;  Surgeon: Hailee Pierce MD;  Location: [facility] LAB CV    CV LEFT HEART CATH N/A 1/3/2022    Procedure: Left Heart Cath;  Surgeon: Hailee Pierce MD;  Location: [facility] CV    CV PCI N/A 1/3/2022    Procedure: Percutaneous Coronary Intervention;  Surgeon: Hailee Pierce MD;  Location: [facility] CV    CYSTOSCOPY, TRANSURETHRAL RESECTION (TUR) TUMOR BLADDER, COMBINED N/A 3/1/2017    Procedure: CYSTOSCOPY BLADDER BIOPSY AND FULGURATION;  Surgeon: Kostas Smith MD;  Location: [facility];  Service:     HERNIA REPAIR      with nesh    IR MISCELLANEOUS PROCEDURE  1/24/2007    IR THORACIC AORTOGRAM  1/24/2007    OTHER SURGICAL HISTORY      aortic bypass    OTHER SURGICAL HISTORY      nasal cauterization         CURRENT MEDICATIONS:    albuterol (PROAIR HFA;PROVENTIL HFA;VENTOLIN HFA) 90 mcg/actuation inhaler  atorvastatin (LIPITOR) 80 MG tablet  blood glucose test strips  clopidogrel (PLAVIX) 75 MG tablet  diltiazem ER COATED BEADS (CARDIZEM CD/CARTIA XT) 180 MG 24 hr capsule  folic acid (FOLVITE) 1 MG tablet  generic  lancets  ketoconazole (NIZORAL) 2 % external shampoo  losartan (COZAAR) 100 MG tablet  methotrexate 2.5 MG tablet  metoprolol succinate ER (TOPROL XL) 50 MG 24 hr tablet  nitroGLYcerin (NITROSTAT) 0.4 MG sublingual tablet  tamsulosin (FLOMAX) 0.4 mg cap        ALLERGIES:  No Known Allergies    FAMILY HISTORY:  Family History   Problem Relation Age of Onset    Acute Myocardial Infarction Father 58.00       SOCIAL HISTORY:   Social History     Socioeconomic History    Marital status:    Tobacco Use    Smoking status: Former     Types: Cigarettes     Quit date: 2007     Years since quittin.9    Smokeless tobacco: Never   Vaping Use    Vaping Use: Never used   Substance and Sexual Activity    Alcohol use: Yes     Comment: Alcoholic Drinks/day: occasional    Drug use: No       PHYSICAL EXAM:    BP (!) 171/77   Pulse 68   Temp 98.3  F (36.8  C)   Resp 18   Wt 102.5 kg (226 lb)   SpO2 97%   BMI 32.43 kg/m        Constitutional: Awake, alert,    Head: Normocephalic, atraumatic.  ENT: Mucous membranes are moist.  No pallor.   Eyes: Pupils are reactive.  No discoloration.  Neck: No lymphadenopathy, no stridor, supple, no soft tissue swelling  Chest: No tenderness   Respiratory: Respirations even, unlabored. Lungs clear to ascultation bilaterally, in no acute respiratory distress.  Cardiovascular: Regular rate and rhythm.  Good overall perfusion.  Upper and lower extremity pulses are equal.  GI: Abdomen soft, non-tender to palpation.  No guarding or rebound. Bowel sounds present throughout.   Back: No CVA tenderness.    Musculoskeletal: Moves all 4 extremities equally, full function and capacity no peripheral edema.   Integument: Warm, dry. No rash. No bruising or petechiae.  Neurologic: Alert & oriented x 3. Normal speech. Grossly normal motor and sensory function. No focal deficits noted.  NIHSS = 0  Psychiatric: Normal mood and affect.  Appropriate judgement.    ED COURSE:          Medical Decision  Making    History:  Supplemental history from: None  External Record(s) reviewed: External medical records including care everywhere reviewed     Work Up:  EKG, laboratory and imaging studies as ordered were independently interpreted by myself.   Broad differential diagnosis considered for neurologic symptom  The patient's presentation was of high complexity.     External consultation:  Discussion of management with another provider: Hospitalist and neurology    Complicating factors:  Patient has a complicated past medical history including multiple medical  Care affected by social determinants of health: Access to primary care    Disposition involved shared decision-making with the patient.      LAB:  Laboratory results were independently reviewed and interpreted  Results for orders placed or performed during the hospital encounter of 02/05/24   CT Head w/o Contrast    Impression    IMPRESSION:  1.  No intracranial hemorrhage, mass lesions, hydrocephalus or CT evidence for an acute infarct. MRI is more sensitive for the evaluation of acute infarcts.  2.  Mild diffuse cerebral parenchymal volume loss. Presumed chronic hypertensive/microvascular ischemic white matter changes.    Discussed the head CT findings with Dr. Aceves at 1416 hours, 02/05/2024.     CTA Head Neck with Contrast    Impression    IMPRESSION:   HEAD CTA:   1.  No hemodynamically significant narrowing of the proximal major intracranial arteries. 2 mm infundibulum at the right middle cerebral artery trifurcation.  2.  Mild atherosclerotic plaque of the cavernous and supraclinoid internal carotid arteries. Mild to moderate atherosclerotic plaque of the intracranial left vertebral artery.     NECK CTA:  1.  Mild plaque at both proximal internal carotid arteries. No hemodynamically significant narrowing of the internal carotid arteries bilaterally based on the NASCET criteria.  2.  Mild narrowing of the origin of the left vertebral artery. Multifocal mild  narrowings of the proximal V2 segment of the left vertebral artery. Mild narrowing of the distal V2 and V3 segment of the right vertebral artery.    Discussed the CTA findings with Dr. Aceves at 1422 hours, 02/05/2024.   CT Head Perfusion w Contrast - For Tier 2 Stroke    Impression    IMPRESSION: Normal cerebral perfusion.     Basic metabolic panel   Result Value Ref Range    Sodium 140 135 - 145 mmol/L    Potassium 4.4 3.4 - 5.3 mmol/L    Chloride 105 98 - 107 mmol/L    Carbon Dioxide (CO2) 24 22 - 29 mmol/L    Anion Gap 11 7 - 15 mmol/L    Urea Nitrogen 11.9 8.0 - 23.0 mg/dL    Creatinine 1.13 0.67 - 1.17 mg/dL    GFR Estimate 66 >60 mL/min/1.73m2    Calcium 9.3 8.8 - 10.2 mg/dL    Glucose 96 70 - 99 mg/dL   Result Value Ref Range    INR 1.08 0.85 - 1.15   Partial thromboplastin time   Result Value Ref Range    aPTT 28 22 - 38 Seconds   Result Value Ref Range    Troponin T, High Sensitivity 15 <=22 ng/L   Glucose by meter   Result Value Ref Range    GLUCOSE BY METER POCT 91 70 - 99 mg/dL   CBC with platelets and differential   Result Value Ref Range    WBC Count 7.8 4.0 - 11.0 10e3/uL    RBC Count 4.33 (L) 4.40 - 5.90 10e6/uL    Hemoglobin 13.5 13.3 - 17.7 g/dL    Hematocrit 39.8 (L) 40.0 - 53.0 %    MCV 92 78 - 100 fL    MCH 31.2 26.5 - 33.0 pg    MCHC 33.9 31.5 - 36.5 g/dL    RDW 14.6 10.0 - 15.0 %    Platelet Count 224 150 - 450 10e3/uL    % Neutrophils 62 %    % Lymphocytes 24 %    % Monocytes 10 %    % Eosinophils 3 %    % Basophils 1 %    % Immature Granulocytes 0 %    NRBCs per 100 WBC 0 <1 /100    Absolute Neutrophils 4.8 1.6 - 8.3 10e3/uL    Absolute Lymphocytes 1.8 0.8 - 5.3 10e3/uL    Absolute Monocytes 0.8 0.0 - 1.3 10e3/uL    Absolute Eosinophils 0.2 0.0 - 0.7 10e3/uL    Absolute Basophils 0.1 0.0 - 0.2 10e3/uL    Absolute Immature Granulocytes 0.0 <=0.4 10e3/uL    Absolute NRBCs 0.0 10e3/uL   ECG 12-LEAD WITH MUSE ([facility])   Result Value Ref Range    Systolic Blood Pressure  mmHg    Diastolic Blood  Pressure  mmHg    Ventricular Rate 56 BPM    Atrial Rate 56 BPM    RI Interval 198 ms    QRS Duration 84 ms     ms    QTc 432 ms    P Axis 47 degrees    R AXIS 30 degrees    T Axis 26 degrees    Interpretation ECG       Sinus bradycardia  Otherwise normal ECG  When compared with ECG of 05-FEB-2024 14:40,  No significant change was found  Confirmed by SEE ED PROVIDER NOTE FOR, ECG INTERPRETATION (4000),  Gloria Aceves ([id number]) on 2/5/2024 2:47:53 PM           RADIOLOGY:  Radiology reports were independently reviewed and interpreted  CT Head Perfusion w Contrast - For Tier 2 Stroke   Final Result   IMPRESSION: Normal cerebral perfusion.         CTA Head Neck with Contrast   Final Result   IMPRESSION:    HEAD CTA:    1.  No hemodynamically significant narrowing of the proximal major intracranial arteries. 2 mm infundibulum at the right middle cerebral artery trifurcation.   2.  Mild atherosclerotic plaque of the cavernous and supraclinoid internal carotid arteries. Mild to moderate atherosclerotic plaque of the intracranial left vertebral artery.       NECK CTA:   1.  Mild plaque at both proximal internal carotid arteries. No hemodynamically significant narrowing of the internal carotid arteries bilaterally based on the NASCET criteria.   2.  Mild narrowing of the origin of the left vertebral artery. Multifocal mild narrowings of the proximal V2 segment of the left vertebral artery. Mild narrowing of the distal V2 and V3 segment of the right vertebral artery.      Discussed the CTA findings with Dr. Aceves at 1422 hours, 02/05/2024.      CT Head w/o Contrast   Final Result   IMPRESSION:   1.  No intracranial hemorrhage, mass lesions, hydrocephalus or CT evidence for an acute infarct. MRI is more sensitive for the evaluation of acute infarcts.   2.  Mild diffuse cerebral parenchymal volume loss. Presumed chronic hypertensive/microvascular ischemic white matter changes.      Discussed the head CT findings with  Dr. Aceves at 1416 hours, 02/05/2024.         MR Brain w/o & w Contrast    (Results Pending)   MRA Angiogram Head w/o Contrast    (Results Pending)   MRA Angiogram Neck w/o & w Contrast    (Results Pending)   Echocardiogram Complete with Bubble Study    (Results Pending)        EKG:    ECG results from 02/05/24   ECG 12-LEAD WITH MUSE ([facility])     Value    Systolic Blood Pressure     Diastolic Blood Pressure     Ventricular Rate 56    Atrial Rate 56    NC Interval 198    QRS Duration 84        QTc 432    P Axis 47    R AXIS 30    T Axis 26    Interpretation ECG      Sinus bradycardia  Otherwise normal ECG  When compared with ECG of 05-FEB-2024 14:40,  No significant change was found  Confirmed by SEE ED PROVIDER NOTE FOR, ECG INTERPRETATION (4000),  Gloria Aceves ([id number]) on 2/5/2024 2:47:53 PM         I have independently reviewed and interpreted the EKG(s) documented above.        MEDICATIONS GIVEN IN THE EMERGENCY:  Medications   albuterol (PROVENTIL HFA/VENTOLIN HFA) inhaler (has no administration in time range)   atorvastatin (LIPITOR) tablet 80 mg (has no administration in time range)   clopidogrel (PLAVIX) tablet 75 mg (has no administration in time range)   nitroGLYcerin (NITROSTAT) sublingual tablet 0.4 mg (has no administration in time range)   glucose gel 15-30 g (has no administration in time range)     Or   dextrose 50 % injection 25-50 mL (has no administration in time range)     Or   glucagon injection 1 mg (has no administration in time range)   insulin aspart (NovoLOG) injection (RAPID ACTING) (has no administration in time range)   insulin aspart (NovoLOG) injection (RAPID ACTING) (has no administration in time range)   lidocaine 1 % 0.1-1 mL (has no administration in time range)   lidocaine (LMX4) cream (has no administration in time range)   sodium chloride (PF) 0.9% PF flush 3 mL (has no administration in time range)   sodium chloride (PF) 0.9% PF flush 3 mL (has no administration in time  range)   Medication Instructions - Avoid dextrose in IV solutions. (has no administration in time range)   medication instruction - No oral meds if patient didn't pass dysphagia screen (has no administration in time range)   ondansetron (ZOFRAN ODT) ODT tab 4 mg (has no administration in time range)     Or   ondansetron (ZOFRAN) injection 4 mg (has no administration in time range)   prochlorperazine (COMPAZINE) injection 5 mg (has no administration in time range)     Or   prochlorperazine (COMPAZINE) tablet 5 mg (has no administration in time range)     Or   prochlorperazine (COMPAZINE) suppository 12.5 mg (has no administration in time range)   iopamidol (ISOVUE-370) solution 75 mL (75 mLs Intravenous $Given 2/5/24 1419)   iopamidol (ISOVUE-370) solution 100 mL (100 mLs Intravenous $Given 2/5/24 1427)   aspirin (ASA) tablet 325 mg (325 mg Oral $Given 2/5/24 1536)           NEW PRESCRIPTIONS STARTED AT TODAY'S ER VISIT:  New Prescriptions    No medications on file                FINAL DIAGNOSIS:    ICD-10-CM    1. Aphasia  R47.01                  NAME: Koko Ronquillo  AGE: 78 year old male  YOB: 1945  MRN: [id number]  EVALUATION DATE & TIME: 2/5/2024  2:04 PM    PCP: Jelani Shabazz    ED PROVIDER: OSMIN Webster M.D.  Emergency Medicine  [facility] EMERGENCY ROOM  [facility] [facility] [id number]  [phone]  Dept: [phone]  2/5/2024       Clyde Aceves MD  02/05/24 1856

## 2024-02-05 NOTE — CONSULTS
[facility]    Stroke Telephone Note    I was called by Clyde Aceves on 02/05/24 regarding patient Koko Ronquillo. The patient is a 78 year old male with PMH afib (not on AC, reviewed last cardiology note from 2/2023 which indicates no known recent afib as reason for not on AC though also  mentions 5 minute episodes of palpitations monthly), HTN, HLD, CAD, SHAWN, RA. LKW reported to be 0800 this morning. He reportedly did not talk to anyone after that, then around 1000 noticed he was having trouble typing/reading on the computer, subsequently noted some difficulty with speaking when making a phone call. Per ED provider speech seems nearly resolved but unclear if back to normal. No other focal neuro deficits reported.    Vitals  BP: (!) 171/77   Pulse: 68   Resp: 18   Temp: 98.3  F (36.8  C)   Weight: 102.5 kg (226 lb)    Stroke Code Data (for stroke code without tele)  Stroke code activated 02/05/24  1405   Stroke provider first response 02/05/24  1407   Last known normal 02/05/24  0800      Time of discovery (or onset of symptoms) 02/05/24  1000   Head CT read by Stroke Neuro Provider 02/05/24  1414   Was stroke code de-escalated? Yes  02/05/24  1433     Imaging Findings  CT head: 1.  No intracranial hemorrhage, mass lesions, hydrocephalus or CT evidence for an acute infarct. MRI is more sensitive for the evaluation of acute infarcts.  2.  Mild diffuse cerebral parenchymal volume loss. Presumed chronic hypertensive/microvascular ischemic white matter changes.    CTA head/neck: HEAD CTA:   1.  No hemodynamically significant narrowing of the proximal major intracranial arteries. 2 mm infundibulum at the right middle cerebral artery trifurcation.  2.  Mild atherosclerotic plaque of the cavernous and supraclinoid internal carotid arteries. Mild to moderate atherosclerotic plaque of the intracranial left vertebral artery.      NECK CTA:  1.  Mild plaque at both proximal internal carotid  "arteries. No hemodynamically significant narrowing of the internal carotid arteries bilaterally based on the NASCET criteria.  2.  Mild narrowing of the origin of the left vertebral artery. Multifocal mild narrowings of the proximal V2 segment of the left vertebral artery. Mild narrowing of the distal V2 and V3 segment of the right vertebral artery.    CTP: Normal cerebral perfusion.     Intravenous Thrombolysis  Not given due to:   - minor/isolated/quickly resolving symptoms  - unclear or unfavorable risk-benefit profile for extended window thrombolysis beyond the conventional 4.5 hour time window    Endovascular Treatment  Not initiated due to absence of proximal vessel occlusion    Impression  Suspected Transient ischemic attack vs small stroke with symptoms of aphasia, now resolved or nearly so    Recommendations   - Admit for TIA/stroke work up  - Please call back when MRI is complete, will review and make recommendations regarding antiplatelet/anticoagulation at that time  - Permissive HTN, SBP<220  - Neuro checks and Vital Signs every 4 hours   - Statin: continue PTA atorvastatin 80 mg, pending LDL  - MRI Brain with and without contrast  - TTE   - 24-hour Telemetry  - Bedside Glucose Monitoring  - Nutrition: nursing to document bedside swallow prior to oral intake  - A1c, Lipid Panel, troponin  - PT/OT/SLP  - Stroke Education  - Depression Screen  - Apnea screening questions  - Euthermia, Euglycemia    Case discussed with vascular neurology attending Dr. Medeiros.    My recommendations are based on the information provided over the phone by Koko Ronquillo's in-person providers. They are not intended to replace the clinical judgment of his in-person providers. I was not requested to personally see or examine the patient at this time.     Namrata Vanessa PA-C  Vascular Neurology    To page me or covering stroke neurology team member, click here: [email or web address]  Choose \"On Call\" tab at top, then select \"NEUROLOGY/ALL SITES\" " "from middle drop-down box, press Enter, then look for \"stroke\" or \"telestroke\" for your site.    "

## 2024-02-06 ENCOUNTER — APPOINTMENT (OUTPATIENT)
Dept: CARDIOLOGY | Facility: CLINIC | Age: 79
End: 2024-02-06
Attending: STUDENT IN AN ORGANIZED HEALTH CARE EDUCATION/TRAINING PROGRAM
Payer: COMMERCIAL

## 2024-02-06 ENCOUNTER — APPOINTMENT (OUTPATIENT)
Dept: SPEECH THERAPY | Facility: CLINIC | Age: 79
End: 2024-02-06
Attending: STUDENT IN AN ORGANIZED HEALTH CARE EDUCATION/TRAINING PROGRAM
Payer: COMMERCIAL

## 2024-02-06 ENCOUNTER — APPOINTMENT (OUTPATIENT)
Dept: OCCUPATIONAL THERAPY | Facility: CLINIC | Age: 79
End: 2024-02-06
Attending: STUDENT IN AN ORGANIZED HEALTH CARE EDUCATION/TRAINING PROGRAM
Payer: COMMERCIAL

## 2024-02-06 VITALS
OXYGEN SATURATION: 94 % | HEART RATE: 79 BPM | DIASTOLIC BLOOD PRESSURE: 70 MMHG | RESPIRATION RATE: 27 BRPM | TEMPERATURE: 97.8 F | WEIGHT: 223.3 LBS | SYSTOLIC BLOOD PRESSURE: 157 MMHG | BODY MASS INDEX: 32.04 KG/M2

## 2024-02-06 LAB
ANION GAP SERPL CALCULATED.3IONS-SCNC: 11 MMOL/L (ref 7–15)
BUN SERPL-MCNC: 11.6 MG/DL (ref 8–23)
CALCIUM SERPL-MCNC: 9.3 MG/DL (ref 8.8–10.2)
CHLORIDE SERPL-SCNC: 106 MMOL/L (ref 98–107)
CHOLEST SERPL-MCNC: 116 MG/DL
CREAT SERPL-MCNC: 1.07 MG/DL (ref 0.67–1.17)
DEPRECATED HCO3 PLAS-SCNC: 23 MMOL/L (ref 22–29)
EGFRCR SERPLBLD CKD-EPI 2021: 71 ML/MIN/1.73M2
ERYTHROCYTE [DISTWIDTH] IN BLOOD BY AUTOMATED COUNT: 14.7 % (ref 10–15)
GLUCOSE BLDC GLUCOMTR-MCNC: 118 MG/DL (ref 70–99)
GLUCOSE SERPL-MCNC: 119 MG/DL (ref 70–99)
HCT VFR BLD AUTO: 38 % (ref 40–53)
HDLC SERPL-MCNC: 30 MG/DL
HGB BLD-MCNC: 12.9 G/DL (ref 13.3–17.7)
LDLC SERPL CALC-MCNC: 55 MG/DL
LVEF ECHO: NORMAL
MCH RBC QN AUTO: 31 PG (ref 26.5–33)
MCHC RBC AUTO-ENTMCNC: 33.9 G/DL (ref 31.5–36.5)
MCV RBC AUTO: 91 FL (ref 78–100)
NONHDLC SERPL-MCNC: 86 MG/DL
PLATELET # BLD AUTO: 187 10E3/UL (ref 150–450)
POTASSIUM SERPL-SCNC: 4 MMOL/L (ref 3.4–5.3)
RBC # BLD AUTO: 4.16 10E6/UL (ref 4.4–5.9)
SODIUM SERPL-SCNC: 140 MMOL/L (ref 135–145)
TRIGL SERPL-MCNC: 156 MG/DL
WBC # BLD AUTO: 6.9 10E3/UL (ref 4–11)

## 2024-02-06 PROCEDURE — 80048 BASIC METABOLIC PNL TOTAL CA: CPT | Performed by: STUDENT IN AN ORGANIZED HEALTH CARE EDUCATION/TRAINING PROGRAM

## 2024-02-06 PROCEDURE — 82962 GLUCOSE BLOOD TEST: CPT

## 2024-02-06 PROCEDURE — 99239 HOSP IP/OBS DSCHRG MGMT >30: CPT | Performed by: STUDENT IN AN ORGANIZED HEALTH CARE EDUCATION/TRAINING PROGRAM

## 2024-02-06 PROCEDURE — G0378 HOSPITAL OBSERVATION PER HR: HCPCS

## 2024-02-06 PROCEDURE — G0427 INPT/ED TELECONSULT70: HCPCS | Performed by: PHYSICIAN ASSISTANT

## 2024-02-06 PROCEDURE — 250N000013 HC RX MED GY IP 250 OP 250 PS 637: Performed by: STUDENT IN AN ORGANIZED HEALTH CARE EDUCATION/TRAINING PROGRAM

## 2024-02-06 PROCEDURE — 93306 TTE W/DOPPLER COMPLETE: CPT | Mod: 26 | Performed by: INTERNAL MEDICINE

## 2024-02-06 PROCEDURE — 92610 EVALUATE SWALLOWING FUNCTION: CPT | Mod: GN

## 2024-02-06 PROCEDURE — 999N000208 ECHOCARDIOGRAM COMPLETE

## 2024-02-06 PROCEDURE — 97165 OT EVAL LOW COMPLEX 30 MIN: CPT | Mod: GO

## 2024-02-06 PROCEDURE — 85014 HEMATOCRIT: CPT | Performed by: STUDENT IN AN ORGANIZED HEALTH CARE EDUCATION/TRAINING PROGRAM

## 2024-02-06 PROCEDURE — 255N000002 HC RX 255 OP 636: Performed by: STUDENT IN AN ORGANIZED HEALTH CARE EDUCATION/TRAINING PROGRAM

## 2024-02-06 PROCEDURE — 36415 COLL VENOUS BLD VENIPUNCTURE: CPT | Performed by: STUDENT IN AN ORGANIZED HEALTH CARE EDUCATION/TRAINING PROGRAM

## 2024-02-06 PROCEDURE — A9585 GADOBUTROL INJECTION: HCPCS | Performed by: STUDENT IN AN ORGANIZED HEALTH CARE EDUCATION/TRAINING PROGRAM

## 2024-02-06 RX ORDER — GADOBUTROL 604.72 MG/ML
10 INJECTION INTRAVENOUS ONCE
Status: COMPLETED | OUTPATIENT
Start: 2024-02-06 | End: 2024-02-06

## 2024-02-06 RX ADMIN — CLOPIDOGREL BISULFATE 75 MG: 75 TABLET ORAL at 08:50

## 2024-02-06 RX ADMIN — GADOBUTROL 10 ML: 604.72 INJECTION INTRAVENOUS at 01:16

## 2024-02-06 ASSESSMENT — ACTIVITIES OF DAILY LIVING (ADL)
ADLS_ACUITY_SCORE: 37
IADL_COMMENTS: IND FOR IADLS, DRIVES, MEDS
ADLS_ACUITY_SCORE: 35
ADLS_ACUITY_SCORE: 37
ADLS_ACUITY_SCORE: 35
ADLS_ACUITY_SCORE: 37

## 2024-02-06 NOTE — DISCHARGE SUMMARY
[facility]  Hospitalist Discharge Summary       Date of Admission:  2/5/2024  Date of Discharge:  2/6/2024  3:23 PM  Discharging Provider: Lucio Barnes MD  Discharge Service: Hospitalist Service     Discharge Diagnoses   TIA   Transient Aphasia   Atrial fibrillation not on anticoagulation   CAD on aspirin and plavix   Hypertension   HLD   SHAWN   DM2   Rheumatoid arthritis on methotrexate      Clinically Significant Risk Factors          Follow-ups Needed After Discharge   Follow-up Appointments     Follow-up and recommended labs and tests       Follow up with primary care provider, Jelani Shabazz, within 7 days for   hospital follow- up.     -suspected to be Transient Ischemic Attack or TIA  -recommended starting anticoagulation with a medication such as   Apixaban/Eliquis or Xarelto. However, he declined this and opted instead   to see his primary care provider within 3 days for follow-up and revisit   that topic.   -Both Physical and Occupational Therapy cleared him to return home but   recommended outpatient-physical therapy for balance.             Unresulted Labs Ordered in the Past 30 Days of this Admission       No orders found for last 31 day(s).        These results will be followed up by [facility]     Discharge Disposition   Discharged to home  Condition at discharge: Stable    Hospital Course        Koko Ronquillo is a 78 year old male who has a pmhx of hyperlipidemia, cad s/p RANCHO in both right coronary and LAD on plavix in Jan 2022, atrial fibrillation, bladder cancer, hypertension, anxiety, DM2, SHAWN, rheumatoid arthritis. Recently here 12/2022 for chest pain that resolved. Presented 2/5/2024 for concern of stroke. Presentation of aphasia at 1pm when he received a call and could not reply to the caller, then he proceeded to google stroke symptoms but could not read his phone; notably within the hour or so of his symptoms beginning they completely resolved. He did have a bilateral  "frontal headache during this time but that has resolved too when reached ER. Only felt slightly dizzy on admission, though was able to ambulate in the unit/room.        On admit pressures 171/77 o/w stable, labs include normal BMP, troponin neg, no leukocytosis and hgb 13.5. CT head showed presumed chronic microvascular ischemic changes but no perfusion defects on CT and CTA showed plaque in internal carotids mild narrowing of left vertebral, V2 and V3 segment of right vertebral artery. Neurology recommended further MRIs.       MRA of the carotids and MRI brain [place] and MRI brain with and without contrast demonstrated no acute findings to explain his symptoms.  Given the transitory nature, his episode is suspected to be a Transient Ischemic Attack or TIA, and the patient had a follow-up stroke neurology evaluation on 2/6.  They recommended starting anticoagulation with a medication such as Apixaban/Eliquis or Xarelto.  We discussed at length with the patient regarding possibly starting anticoagulation such as Eliquis or Xarelto, and given his history of rheumatoid arthritis on methotrexate, there is a potential increased risk for bleed and hence recommended close monitoring of his hemoglobin and his stool output overnight. However, he ultimately decided to decline anticoagulation at this time.  He reported that he is \"100% sure\" that he does not want to start anticoagulation yet, but he did state he will to see his primary care provider very closely within 3 days to revisit this topic as recommended given he declined anticogulation.  Physical and Occupational Therapy evaluated and he was cleared to return back to his home without further cares and was also cleared to drive.       Consultations This Hospital Stay   NEUROLOGY IP STROKE CONSULT  SPEECH LANGUAGE PATH ADULT IP CONSULT  PHARMACY IP CONSULT  PHARMACY IP CONSULT  PHARMACY IP CONSULT  PHYSICAL THERAPY ADULT IP CONSULT  OCCUPATIONAL THERAPY ADULT " IP CONSULT  REHAB ADMISSIONS LIAISON IP CONSULT  CARE MANAGEMENT / SOCIAL WORK IP CONSULT  PHARMACY IP CONSULT  SMOKING CESSATION PROGRAM IP CONSULT    Code Status   Full Code    Time Spent on this Encounter   I, Lucio Barnes MD, personally saw the patient today and spent greater than 30 minutes discharging this patient.       Lucio Barnes MD  [facility] EMERGENCY ROOM  [address] [place] [id number]  Phone: [phone]  Fax: [phone]  ______________________________________________________________________    Physical Exam   Vital Signs: Temp: 97.8  F (36.6  C) Temp src: Oral BP: (!) 157/70 Pulse: 79   Resp: 27 SpO2: 94 % O2 Device: None (Room air)    Weight: 223 lbs 4.8 oz    General: alert, oriented, and in no acute distress  Pulmonary: clear to auscultation bilaterally, normal respiratory effort, on room air, no rales or wheezes or evidence of accessory muscle use  CVS: regular rate and rhythm on auscultation and cardiac monitoring, no murmurs, rubs, or gallops; no blatant jugular venous distention; no extremity edema and extremities are warm to the touch  GI: soft, nontender, BS+, no rebound or guarding, no conspicuous organomegaly   Neuro: nonfocal, moves all extremities of own volition; strength remains 5/5 in all 5 limbs, no coordination deficits, no swallowing deficits, speech preserved, sensation intact throughout and symmetric and proprioception preserved, ROM in 4 limbs are WNL, sensation to various stimuli remain intact bilaterally, EOMI equal and PERRL, negative down-going babinski reflexes, deep tendon reflexes: patellar and biceps reflexes are normoreflexive bilaterally, no dysdiadochocinesia, no swallowing issues   Psych: appropriate           Primary Care Physician   Jelani Shabazz    Discharge Orders      Adult Sleep Eval & Management Referral      Physical Therapy Referral      Reason for your hospital stay    Difficulty with speaking  temporarily; suspected to be Transient Ischemic Attack or TIA, we recommended starting anticoagulation with a medication such as Apixaban/Eliquis or Xarelto. You declined this and opted instead to see your primary care provider within 3 days for follow-up and revisit that topic. Both Physical and Occupational Therapy cleared you to return home but start outpatient-physical therapy for balance. Finally, consider the Mediterranean Diet.     Follow-up and recommended labs and tests     Follow up with primary care provider, Jelani Shabazz, within 7 days for hospital follow- up.     -suspected to be Transient Ischemic Attack or TIA  -recommended starting anticoagulation with a medication such as Apixaban/Eliquis or Xarelto. However, he declined this and opted instead to see his primary care provider within 3 days for follow-up and revisit that topic.   -Both Physical and Occupational Therapy cleared him to return home but recommended outpatient-physical therapy for balance.     Activity    Your activity upon discharge: activity as tolerated     Diet    Follow this diet upon discharge: Orders Placed This Encounter      Combination Diet Regular Diet; Moderate Consistent Carb (60 g CHO per Meal) Diet     Stroke Hospital Follow Up (for neurologist use only)    [facility] will call you to coordinate care as prescribed by your provider. If you don t hear from a representative within 2 business days, please call [phone].         Significant Results and Procedures   Results for orders placed or performed during the hospital encounter of 02/05/24   CT Head w/o Contrast    Narrative    EXAM: CT HEAD W/O CONTRAST  LOCATION: [facility]  DATE: 2/5/2024    INDICATION: Code Stroke to evaluate for potential thrombolysis and thrombectomy. PLEASE READ IMMEDIATELY. Aphasia.  COMPARISON: None.  TECHNIQUE: Routine CT Head without IV contrast. Multiplanar reformats. Dose reduction techniques were  used.    FINDINGS:  INTRACRANIAL CONTENTS: No intracranial hemorrhage. Mild diffuse cerebral parenchymal volume loss. No midline shift. The basilar cisterns are patent. Mild periventricular and scattered foci of deep white matter hypodensities involving both cerebral   hemispheres. No CT evidence for an acute infarct.    VISUALIZED ORBITS/SINUSES/MASTOIDS: Prior bilateral cataract surgery. Visualized portions of the orbits are otherwise unremarkable. Mild chronic mucosal thickening of the maxillary sinuses and ethmoid air cells. No middle ear or mastoid effusion.    BONES/SOFT TISSUES: No acute abnormality.      Impression    IMPRESSION:  1.  No intracranial hemorrhage, mass lesions, hydrocephalus or CT evidence for an acute infarct. MRI is more sensitive for the evaluation of acute infarcts.  2.  Mild diffuse cerebral parenchymal volume loss. Presumed chronic hypertensive/microvascular ischemic white matter changes.    Discussed the head CT findings with Dr. Aceves at 1416 hours, 02/05/2024.     CTA Head Neck with Contrast    Narrative    EXAM: CTA HEAD NECK W CONTRAST  LOCATION: [facility]  DATE: 2/5/2024    INDICATION: Code Stroke to evaluate for potential thrombolysis and thrombectomy. PLEASE READ IMMEDIATELY.. Aphasia.  COMPARISON: Head CT from today 02/05/2024  CONTRAST: 75ml Isovue 370  TECHNIQUE: Head and neck CT angiogram with IV contrast. Axial helical CT images of the head and neck vessels obtained during the arterial phase of intravenous contrast administration. Axial 2D reconstructed images and multiplanar 3D MIP reconstructed   images of the head and neck vessels were performed by the technologist. Dose reduction techniques were used. All stenosis measurements made according to NASCET criteria unless otherwise specified.    FINDINGS:   HEAD CTA:  ANTERIOR CIRCULATION: Mild atherosclerotic plaque of the cavernous and supraclinoid internal carotid arteries. The anterior cerebral  arteries are patent without hemodynamically significant stenosis. The left middle cerebral artery is patent without   hemodynamically significant stenosis. The right middle cerebral artery is patent without hemodynamically significant stenosis. Standard [place] anatomy. 2 mm infundibulum at the right middle cerebral artery trifurcation.    POSTERIOR CIRCULATION: Mild to moderate atherosclerotic plaque of the intracranial left vertebral artery. The right vertebral artery, and the bilateral posterior cerebral arteries are patent without hemodynamically significant stenosis. Dominant right   and smaller left vertebral artery contribute to a normal basilar artery.     DURAL VENOUS SINUSES: The major dural venous sinuses are not well opacified on this exam.    NECK CTA:  RIGHT CAROTID: Mild atherosclerotic plaque of the right carotid bulb and proximal right internal carotid artery. No hemodynamically significant narrowing of the right internal carotid artery based on the massive criteria.    LEFT CAROTID: Mild to moderate atherosclerotic plaque of the distal left common carotid artery and proximal left internal carotid artery. No hemodynamically significant narrowing of the left internal carotid artery based on the NASCET criteria.     VERTEBRAL ARTERIES: Mild narrowing of the origin of the left vertebral artery. Multifocal mild narrowings of the proximal V2 segment of the left vertebral artery. Mild narrowing of the distal V2 and V3 segment of the right vertebral artery. Dominant   right and smaller left vertebral arteries.    AORTIC ARCH: Classic aortic arch anatomy with no significant stenosis at the origin of the great vessels.    NONVASCULAR STRUCTURES: Mild emphysematous changes of the lung apices. Mild to moderate degenerative changes of the cervical spine and upper thoracic spine.      Impression    IMPRESSION:   HEAD CTA:   1.  No hemodynamically significant narrowing of the proximal major intracranial  arteries. 2 mm infundibulum at the right middle cerebral artery trifurcation.  2.  Mild atherosclerotic plaque of the cavernous and supraclinoid internal carotid arteries. Mild to moderate atherosclerotic plaque of the intracranial left vertebral artery.     NECK CTA:  1.  Mild plaque at both proximal internal carotid arteries. No hemodynamically significant narrowing of the internal carotid arteries bilaterally based on the NASCET criteria.  2.  Mild narrowing of the origin of the left vertebral artery. Multifocal mild narrowings of the proximal V2 segment of the left vertebral artery. Mild narrowing of the distal V2 and V3 segment of the right vertebral artery.    Discussed the CTA findings with Dr. Aceves at 1422 hours, 02/05/2024.   CT Head Perfusion w Contrast - For Tier 2 Stroke    Narrative    EXAM: CT HEAD PERFUSION W CONTRAST  LOCATION: [facility]  DATE: 2/5/2024    INDICATION: Code Stroke to evaluate for potential thrombolysis and thrombectomy. Evaluate mismatch between penumbra and core infarct. READ IMMEDIATELY. Aphasia.  COMPARISON: Head and neck CT angiogram 02/05/2024  TECHNIQUE: CT cerebral perfusion was performed utilizing a second contrast bolus. Perfusion data were post processed with generation of standard perfusion maps and estimation of ischemic/infarcted volumes utilizing standard threshold values. Dose   reduction techniques were used. All stenosis measurements made according to NASCET criteria unless otherwise specified.  CONTRAST: 100ml Isovue 370     FINDINGS:   CT PERFUSION:  PERFUSION MAPS: Symmetrical cerebral perfusion. No focal deficits in cerebral blood flow or volume to suggest ischemia/oligemia.    RAPID ANALYSIS:  CBF<30%: 0 mL  Tmax>6sec: 0 mL  Mismatch volume: 0 mL  Mismatch ratio: No      Impression    IMPRESSION: Normal cerebral perfusion.     MR Brain w/o & w Contrast    Narrative    EXAM: MR BRAIN W/O and W CONTRAST, MRA NECK (CAROTIDS) W/O and W  CONTRAST, MRA BRAIN ([place]) W/O CONTRAST  LOCATION: [facility]  DATE: 2/6/2024    INDICATION: aphasia  COMPARISON: 02/05/2024.  CONTRAST: 10 ml gadavist  TECHNIQUE:   1) Routine multiplanar multisequence head MRI without and with intravenous contrast.  2) 3D time-of-flight head MRA without intravenous contrast.  3) Neck MRA without and with IV contrast. Stenosis measurements made according to NASCET criteria unless otherwise specified.    FINDINGS:  HEAD MRI:  INTRACRANIAL CONTENTS: On the diffusion-weighted images there is no evidence of acute ischemia or restricted diffusion. There is no discrete mass lesion or midline shift. There is no acute extra-axial fluid collection or acute intraparenchymal   hemorrhage. There is metallic artifact right frontal region. There are appropriate flow voids within the cavernous portions of the internal carotid arteries and the basilar artery. There are prominent perivascular spaces within the basal ganglia   bilaterally. On the FLAIR and T2-weighted images there are multiple foci of high signal within the periventricular and subcortical white matter consistent with mild diffuse small vessel ischemic disease. The ventricular system, basal cisterns and the   cortical sulci are consistent with diffuse volume loss. Following the administration of contrast no abnormal enhancement is visualized.    There is no evidence of cerebellar tonsillar ectopia. The corpus callosum and the sella region have appropriate configuration and signal intensity for the patient's age. The orbit regions are unremarkable. There is no significant paranasal sinus disease.   There is fluid within the mastoid air cells bilaterally and the right middle ear region.     HEAD MRA:   ANTERIOR CIRCULATION: No stenosis/occlusion, aneurysm, or high flow vascular malformation. There is a patent anterior communicating artery and a patent left posterior communicating  artery.    POSTERIOR CIRCULATION: No stenosis/occlusion, aneurysm, or high flow vascular malformation. The right vertebral artery is dominant but both vertebral arteries connect up to the basilar artery.     NECK MRA:   RIGHT CAROTID: No measurable stenosis or dissection.    LEFT CAROTID: No measurable stenosis or dissection.    VERTEBRAL ARTERIES: No focal stenosis or dissection. Right vertebral artery dominant but both vertebral arteries are patent throughout the neck region.    AORTIC ARCH: Classic aortic arch anatomy with no significant stenosis at the origin of the great vessels.      Impression    IMPRESSION:  HEAD MRI:   1.  No discrete mass lesion, hemorrhage or focal area suggestive of acute ischemia.  2.  No abnormal enhancement.  3.  Diffuse age related changes.    HEAD MRA:   1.  No discrete vessel occlusion, significant stenosis, aneurysm or high flow vascular malformation involving the arteries of the [place].    NECK MRA:  1.  Normal configuration of the great vessels off the aortic arch with no significant stenosis of their origins.  2.  No significant stenosis or irregularity involving the arteries of the neck by NASCET criteria.  3.  No radiographic evidence of dissection.   MRA Angiogram Head w/o Contrast    Narrative    EXAM: MR BRAIN W/O and W CONTRAST, MRA NECK (CAROTIDS) W/O and W CONTRAST, MRA BRAIN ([place]) W/O CONTRAST  LOCATION: [facility]  DATE: 2/6/2024    INDICATION: aphasia  COMPARISON: 02/05/2024.  CONTRAST: 10 ml gadavist  TECHNIQUE:   1) Routine multiplanar multisequence head MRI without and with intravenous contrast.  2) 3D time-of-flight head MRA without intravenous contrast.  3) Neck MRA without and with IV contrast. Stenosis measurements made according to NASCET criteria unless otherwise specified.    FINDINGS:  HEAD MRI:  INTRACRANIAL CONTENTS: On the diffusion-weighted images there is no evidence of acute ischemia or restricted  diffusion. There is no discrete mass lesion or midline shift. There is no acute extra-axial fluid collection or acute intraparenchymal   hemorrhage. There is metallic artifact right frontal region. There are appropriate flow voids within the cavernous portions of the internal carotid arteries and the basilar artery. There are prominent perivascular spaces within the basal ganglia   bilaterally. On the FLAIR and T2-weighted images there are multiple foci of high signal within the periventricular and subcortical white matter consistent with mild diffuse small vessel ischemic disease. The ventricular system, basal cisterns and the   cortical sulci are consistent with diffuse volume loss. Following the administration of contrast no abnormal enhancement is visualized.    There is no evidence of cerebellar tonsillar ectopia. The corpus callosum and the sella region have appropriate configuration and signal intensity for the patient's age. The orbit regions are unremarkable. There is no significant paranasal sinus disease.   There is fluid within the mastoid air cells bilaterally and the right middle ear region.     HEAD MRA:   ANTERIOR CIRCULATION: No stenosis/occlusion, aneurysm, or high flow vascular malformation. There is a patent anterior communicating artery and a patent left posterior communicating artery.    POSTERIOR CIRCULATION: No stenosis/occlusion, aneurysm, or high flow vascular malformation. The right vertebral artery is dominant but both vertebral arteries connect up to the basilar artery.     NECK MRA:   RIGHT CAROTID: No measurable stenosis or dissection.    LEFT CAROTID: No measurable stenosis or dissection.    VERTEBRAL ARTERIES: No focal stenosis or dissection. Right vertebral artery dominant but both vertebral arteries are patent throughout the neck region.    AORTIC ARCH: Classic aortic arch anatomy with no significant stenosis at the origin of the great vessels.      Impression    IMPRESSION:  HEAD  MRI:   1.  No discrete mass lesion, hemorrhage or focal area suggestive of acute ischemia.  2.  No abnormal enhancement.  3.  Diffuse age related changes.    HEAD MRA:   1.  No discrete vessel occlusion, significant stenosis, aneurysm or high flow vascular malformation involving the arteries of the [place].    NECK MRA:  1.  Normal configuration of the great vessels off the aortic arch with no significant stenosis of their origins.  2.  No significant stenosis or irregularity involving the arteries of the neck by NASCET criteria.  3.  No radiographic evidence of dissection.   MRA Angiogram Neck w/o & w Contrast    Narrative    EXAM: MR BRAIN W/O and W CONTRAST, MRA NECK (CAROTIDS) W/O and W CONTRAST, MRA BRAIN ([place]) W/O CONTRAST  LOCATION: [facility]  DATE: 2/6/2024    INDICATION: aphasia  COMPARISON: 02/05/2024.  CONTRAST: 10 ml gadavist  TECHNIQUE:   1) Routine multiplanar multisequence head MRI without and with intravenous contrast.  2) 3D time-of-flight head MRA without intravenous contrast.  3) Neck MRA without and with IV contrast. Stenosis measurements made according to NASCET criteria unless otherwise specified.    FINDINGS:  HEAD MRI:  INTRACRANIAL CONTENTS: On the diffusion-weighted images there is no evidence of acute ischemia or restricted diffusion. There is no discrete mass lesion or midline shift. There is no acute extra-axial fluid collection or acute intraparenchymal   hemorrhage. There is metallic artifact right frontal region. There are appropriate flow voids within the cavernous portions of the internal carotid arteries and the basilar artery. There are prominent perivascular spaces within the basal ganglia   bilaterally. On the FLAIR and T2-weighted images there are multiple foci of high signal within the periventricular and subcortical white matter consistent with mild diffuse small vessel ischemic disease. The ventricular system, basal cisterns  and the   cortical sulci are consistent with diffuse volume loss. Following the administration of contrast no abnormal enhancement is visualized.    There is no evidence of cerebellar tonsillar ectopia. The corpus callosum and the sella region have appropriate configuration and signal intensity for the patient's age. The orbit regions are unremarkable. There is no significant paranasal sinus disease.   There is fluid within the mastoid air cells bilaterally and the right middle ear region.     HEAD MRA:   ANTERIOR CIRCULATION: No stenosis/occlusion, aneurysm, or high flow vascular malformation. There is a patent anterior communicating artery and a patent left posterior communicating artery.    POSTERIOR CIRCULATION: No stenosis/occlusion, aneurysm, or high flow vascular malformation. The right vertebral artery is dominant but both vertebral arteries connect up to the basilar artery.     NECK MRA:   RIGHT CAROTID: No measurable stenosis or dissection.    LEFT CAROTID: No measurable stenosis or dissection.    VERTEBRAL ARTERIES: No focal stenosis or dissection. Right vertebral artery dominant but both vertebral arteries are patent throughout the neck region.    AORTIC ARCH: Classic aortic arch anatomy with no significant stenosis at the origin of the great vessels.      Impression    IMPRESSION:  HEAD MRI:   1.  No discrete mass lesion, hemorrhage or focal area suggestive of acute ischemia.  2.  No abnormal enhancement.  3.  Diffuse age related changes.    HEAD MRA:   1.  No discrete vessel occlusion, significant stenosis, aneurysm or high flow vascular malformation involving the arteries of the [place].    NECK MRA:  1.  Normal configuration of the great vessels off the aortic arch with no significant stenosis of their origins.  2.  No significant stenosis or irregularity involving the arteries of the neck by NASCET criteria.  3.  No radiographic evidence of dissection.   Echocardiogram Complete with Bubble  Study     Value    LVEF  55-60%    [facility]    [id number]  [id number]  [id number]  [id number]^ARYAN^MOHIT^[place] [address]     Name: PAUL DOTSON  MRN: [id number]  : 1945  Study Date: 2024 08:10 AM  Age: 78 yrs  Gender: Male  Patient Location: [facility]  Reason For Study: CVA  Ordering Physician: MOHIT BAEZA  Performed By: TANJA     BSA: 2.2 m2  Height: 70 in  Weight: 233 lb  HR: 68  BP: 171/77 mmHg  ______________________________________________________________________________  Procedure  Complete Portable Bubble Echo Adult. Compared to prior study, there is no  significant change.  ______________________________________________________________________________  Interpretation Summary     The left ventricle is normal in size.  Left ventricular function is normal.The ejection fraction is 55-60%.  There is mild concentric left ventricular hypertrophy.  Diastolic Doppler findings (E/E' ratio and/or other parameters) suggest left  ventricular filling pressures are increased.  Normal right ventricle size and systolic function.  A contrast injection (Bubble Study) was performed that was negative for flow  across the interatrial septum.  The aortic valve is trileaflet with aortic valve sclerosis.  Mildly dilated aortic root.  Compared to prior study, there is no significant change.  ______________________________________________________________________________  Left Ventricle  The left ventricle is normal in size. Left ventricular function is normal.The  ejection fraction is 55-60%. There is mild concentric left ventricular  hypertrophy. Left ventricular diastolic function is abnormal. Diastolic  Doppler findings (E/E' ratio and/or other parameters) suggest left ventricular  filling pressures are increased. No regional wall motion abnormalities noted.     Right Ventricle  Normal right ventricle size and systolic function.     Atria  The left atrium is not well  visualized. Right atrium not well visualized. A  contrast injection (Bubble Study) was performed that was negative for flow  across the interatrial septum.     Mitral Valve  Mitral valve leaflets appear normal. There is trace mitral regurgitation.     Tricuspid Valve  The tricuspid valve is not well visualized. Right ventricular systolic  pressure could not be approximated due to inadequate tricuspid regurgitation.  No tricuspid regurgitation.     Aortic Valve  The aortic valve is trileaflet with aortic valve sclerosis. There is trace  aortic regurgitation. No hemodynamically significant valvular aortic stenosis.     Pulmonic Valve  The pulmonic valve is not well visualized.     Vessels  Mildly dilated aortic root.     Pericardium  There is no pericardial effusion.     ______________________________________________________________________________  MMode/2D Measurements & Calculations  IVSd: 1.2 cm  LVIDd: 6.1 cm  LVIDs: 4.4 cm  LVPWd: 0.98 cm  FS: 28.2 %     LV mass(C)d: 283.9 grams  LV mass(C)dI: 127.5 grams/m2  Ao root diam: 3.5 cm  LA dimension: 3.2 cm  LA/Ao: 0.93  LVOT diam: 1.7 cm  LVOT area: 2.4 cm2  Ao root diam index Ht(cm/m): 1.9  Ao root diam index BSA (cm/m2): 1.6  RV Base: 3.5 cm  RWT: 0.32  TAPSE: 3.0 cm     Time Measurements  MM HR: 50.0 BPM     Doppler Measurements & Calculations  MV E max carlin: 91.3 cm/sec  MV A max carlin: 120.8 cm/sec  MV E/A: 0.76  MV max P.1 mmHg  MV mean P.0 mmHg  MV V2 VTI: 29.3 cm  MVA(VTI): 1.8 cm2  MV dec slope: 379.8 cm/sec2  MV dec time: 0.24 sec     LV V1 max PG: 3.8 mmHg  LV V1 max: 97.7 cm/sec  LV V1 VTI: 22.2 cm  SV(LVOT): 53.3 ml  SI(LVOT): 24.0 ml/m2  PA acc time: 0.08 sec  E/E' av.4  Lateral E/e': 14.7  Medial E/e': 20.0  RV S Carlin: 14.7 cm/sec     ______________________________________________________________________________  Report approved by: Kleber Mcpherson 2024 02:00 PM             Discharge Medications   Current Discharge Medication  List        CONTINUE these medications which have NOT CHANGED    Details   albuterol (PROAIR HFA;PROVENTIL HFA;VENTOLIN HFA) 90 mcg/actuation inhaler Inhale 2 puffs into the lungs every 4 hours as needed    Comments: May substitute the equivalent medication per insurance preference.      atorvastatin (LIPITOR) 80 MG tablet Take 1 tablet (80 mg) by mouth daily  Qty: 90 tablet, Refills: 3    Associated Diagnoses: Mixed hyperlipidemia; NSTEMI (non-ST elevated myocardial infarction) (H)      blood glucose test strips [BLOOD GLUCOSE TEST STRIPS] Use 1 each As Directed 2 (two) times a day. Dispense brand per patient's insurance at pharmacy discretion.  Qty: 180 strip, Refills: 1    Associated Diagnoses: Type 2 diabetes mellitus treated without insulin (H)      clopidogrel (PLAVIX) 75 MG tablet Take 1 tablet (75 mg) by mouth daily Dose to start tomorrow.  Qty: 90 tablet, Refills: 3    Associated Diagnoses: Mixed hyperlipidemia; NSTEMI (non-ST elevated myocardial infarction) (H)      diltiazem ER COATED BEADS (CARDIZEM CD/CARTIA XT) 180 MG 24 hr capsule Take 1 capsule (180 mg) by mouth daily  Qty: 90 capsule, Refills: 4    Associated Diagnoses: Essential hypertension; Essential hypertension with goal blood pressure less than 140/90; New onset atrial fibrillation (H)      folic acid (FOLVITE) 1 MG tablet Take 1 mg by mouth daily Do not take on methotrexate days (Tuesday)  Refills: 4      generic lancets [GENERIC LANCETS] Use 1 each As Directed 4 (four) times a day.  Qty: 100 each, Refills: 6    Comments: Pt prefers fastclix rather than softclix  Associated Diagnoses: Diabetes mellitus, type 2 (H)      ketoconazole (NIZORAL) 2 % external shampoo Apply topically twice a week      losartan (COZAAR) 100 MG tablet TAKE 1 TABLET(100 MG) BY MOUTH DAILY  Qty: 90 tablet, Refills: 2    Associated Diagnoses: Essential hypertension; Abdominal aortic aneurysm (AAA) without rupture (H24)      methotrexate 2.5 MG tablet Take 10 mg by mouth  "once a week 4 tabs once weekly on Tuesdays      metoprolol succinate ER (TOPROL XL) 50 MG 24 hr tablet Take 50 mg by mouth daily      nitroGLYcerin (NITROSTAT) 0.4 MG sublingual tablet One tablet under the tongue every 5 minutes if needed for chest pain. May repeat every 5 minutes for a maximum of 3 doses in 15 minutes\"  Qty: 25 tablet, Refills: 3    Associated Diagnoses: Mixed hyperlipidemia; NSTEMI (non-ST elevated myocardial infarction) (H)      tamsulosin (FLOMAX) 0.4 mg cap [TAMSULOSIN (FLOMAX) 0.4 MG CAP] Take 1 capsule (0.4 mg total) by mouth 2 (two) times a day.  Qty: 180 capsule, Refills: 0    Associated Diagnoses: BPH (benign prostatic hyperplasia)           Allergies   No Known Allergies  "

## 2024-02-06 NOTE — PROGRESS NOTES
Patient discharged with self transportation by private car to home (Cleared by OT to drive). VSS on RA. PIV access removed prior to discharge. Belongings remain with pt at discharge. AVS reviewed with pt, questions answered, education complete.

## 2024-02-06 NOTE — PHARMACY-RX INSURANCE COVERAGE
Consulted bywilma Kleley by to run a test claim for DOAC meds.    Patient has pharmacy benefits through [employer] with $300 remaining in 2024 RX Deductible. Per insurance, the following are covered and preferred under the patient's plans:     Eliquis - $347 for 1st mo d/t deductible, then $47/mo  Xarelto - $347 for 1st mo d/t deductible, then $47/mo    The following are not covered:  Dabigatran  Pradaxa  Savaysa      Please feel free to contact me with any other test claims, prior authorizations, or insurance questions regarding outpatient medications.     Thanks!      Bianca Dejesus [facility]  Discharge Pharmacy Liaison  [facility]  Pronouns: She/Her/Hers  (Covering Jumana Lopez)    Securely message with "[employer]", [employer] Secure Chat, or [employer]  Phone: [phone]  Fax: [phone]  [email or web address]

## 2024-02-06 NOTE — PROGRESS NOTES
Physical Therapy: Orders received. Chart reviewed and discussed with care team.? Physical Therapy not indicated due to per OT, pt is independent with functional mobility.? Defer discharge recommendations to Care Team.? Will complete orders.

## 2024-02-06 NOTE — PROGRESS NOTES
PRIMARY DIAGNOSIS: Aphasia episode   OUTPATIENT/OBSERVATION GOALS TO BE MET BEFORE DISCHARGE:  ADLs back to baseline: No    Activity and level of assistance: Bedrest     Pain status: Denies pain    Return to near baseline physical activity: No     Discharge Planner Nurse   Safe discharge environment identified:   Barriers to discharge:          Please review provider order for any additional goals.   Nurse to notify provider when observation goals have been met and patient is ready for discharge.

## 2024-02-06 NOTE — ED NOTES
Pt video chat with tele stroke now. Helped do neuro assessment with provider on tele stroke team.

## 2024-02-06 NOTE — PROGRESS NOTES
"   02/06/24 1340   Appointment Info   Signing Clinician's Name / Credentials (OT) Cheyenne Rasmussen, OTR/L   Quick Adds   Quick Adds Certification   Living Environment   People in Home significant other   Current Living Arrangements house   Home Accessibility stairs to enter home   Number of Stairs, Main Entrance 3   Living Environment Comments report intermittent balance difficulties - no falls but reports feeling \"tipsy\" at times   Self-Care   Equipment Currently Used at Home none   Fall history within last six months no   Activity/Exercise/Self-Care Comment ind for BADLs   Instrumental Activities of Daily Living (IADL)   IADL Comments ind for IADLs, drives, meds   General Information   Onset of Illness/Injury or Date of Surgery 02/05/24   Referring Physician Lucio Barnes MD   Patient/Family Therapy Goal Statement (OT) go home   Additional Occupational Profile Info/Pertinent History of Current Problem presents for stroke workup with complaints of aphasia which has resolved. PMH hyperlipidemia, cad s/p RANCHO in both right coronary and LAD on plavix in Jan 2022, atrial fibrillation, bladder cancer, hypertension, anxiety, DM2, SHAWN, rheumatoid arthritis   Existing Precautions/Restrictions no known precautions/restrictions   Cognitive Status Examination   Orientation Status orientation to person, place and time   Affect/Mental Status (Cognitive) WFL   Follows Commands WFL   Cognitive Status Comments 3/3 short term recall within 2 min and 5 min. pt able to state [id number] directions to home.   Visual Perception   Visual Impairment/Limitations WFL   Impact of Vision Impairment on Function (Vision) no deficits during screening   Sensory   Sensory Quick Adds sensation intact   Pain Assessment   Patient Currently in Pain No   Range of Motion Comprehensive   General Range of Motion no range of motion deficits identified   Strength Comprehensive (MMT)   General Manual Muscle Testing (MMT) Assessment no strength deficits " identified   Comment, General Manual Muscle Testing (MMT) Assessment equal L and RUE   Coordination   Upper Extremity Coordination No deficits were identified   Gross Motor Coordination DEJUAN WNL   Fine Motor Coordination opposition WNL   Bed Mobility   Bed Mobility No deficits identified   Transfers   Transfers No deficits identified;sit-stand transfer   Sit-Stand Transfer   Sit-Stand [place] (Transfers) independent   Balance   Balance Comments IND for 4 high marches with BLE using bed for support, 90 and 180 degree turns, looking L and R while walking, and quick stops   Activities of Daily Living   BADL Assessment/Intervention lower body dressing;toileting   Lower Body Dressing Assessment/Training   [place] Level (Lower Body Dressing) independent   Toileting   [place] Level (Toileting) independent   Clinical Impression   Criteria for Skilled Therapeutic Interventions Met (OT) Evaluation only   Clinical Decision Making Complexity (OT) problem focused assessment/low complexity   Risk & Benefits of therapy have been explained evaluation/treatment results reviewed;participants included;patient   Clinical Impression Comments Pt with higher level balance deficits but is IND for I/ADLs and mobility. Recommend minor deficits be addressed by outpatient PT.   OT Total Evaluation Time   OT Eval, Low Complexity Minutes (95374) 20   Therapy Certification   Medical Diagnosis aphasia   Start of Care Date 02/06/24   Certification date from 02/06/24   Certification date to 02/13/24   OT Discharge Planning   OT Plan d/c   OT Discharge Recommendation (DC Rec) home  (with outpatient PT)   OT Rationale for DC Rec at baseline for I/ADLs   OT Brief overview of current status IND   Total Session Time   Total Session Time (sum of timed and untimed services) 20

## 2024-02-06 NOTE — PROGRESS NOTES
Care Management Discharge Note    Discharge Date: 02/06/2024       Discharge Disposition: Home    Discharge Services:  (OP PT- resources provided)    Discharge DME: None    Discharge Transportation: car, drives self    Private pay costs discussed:  None indicated at this time or discussed by this CM.     Does the patient's insurance plan have a 3 day qualifying hospital stay waiver?  Yes     Which insurance plan 3 day waiver is available? Alternative insurance waiver    Will the waiver be used for post-acute placement? No    PAS Confirmation Code:    Patient/family educated on Medicare website which has current facility and service quality ratings:  (Patient declines at this time)    Education Provided on the Discharge Plan: Yes (AVS will be per bedside RN)  Persons Notified of Discharge Plans: patient  Patient/Family in Agreement with the Plan: yes    Handoff Referral Completed: CM coordinated with patient and bedside RN. CM provided OP PT resources.     Additional Information:  Chart reviewed.     OT evaluated and recommendation is for home with OP PT. CM met with patient. CM provided OP PT resources. He states he will likely call his PCP with [facility] to further discuss recommendations and follow up.     He plans to drive himself home.     April Beasley, RN

## 2024-02-06 NOTE — CONSULTS
"[facility]    Stroke Consult Note    Reason for Consult:  TIA    Chief Complaint: Aphasia       HPI  Koko Ronquillo is a 78 year old male with pertinent past medical history of Atrial fibrillation not on anticoagulation (not on AC per last cardiology note 2/2023 due to no known recent afib, some report of intermittent palpitations), HTN, HLD, CAD, SHAWN, DM2, RA. On PTA Lipitor 80 mg daily and plavix 75 mg daily.     He presented to [facility] ED due to difficulty speaking, and typing at a computer/reading on the computer. BP on arrival 187/86. Vessels with mild b/l vertebral artery stenosis. MRI negative for stroke. Was started on DAPT with ASA and plavix and admitted for TIA work-up.                   He is seen today via telemedicine video exam for inpatient consult.  He describes event as:    He was laying on the couch watching TV and received a phone call.  He knew what they were saying that he was unable to answer.  He said when he tried to speak it sounded like \"alien talk\", no real words were able to be produced.  He wondered if he was having a stroke so he opened his laptop and try and typing \"mini stroke\" but he was unable to spell it.  He denies any associated weakness, numbness/tingling, or vision changes.  He believes symptom duration was approximately 1 hour.  He presented to the emergency department and by the time he arrived his symptoms were resolving.  We discussed that    Given the symptoms of expressive aphasia concern for left hemisphere TIA.  High suspicion for cardioembolic given his atrial fibrillation.  He denies any current or prior bleeding issues.  He does not have any bleeding or black/tarry stools.  Denies frequent falls.  [id number] score 7.  He is agreeable to starting anticoagulation.  We also reviewed his other vascular risk factors.  He checks his blood pressure occasionally at home.  SBP usually 145-150.  Recommend that he check twice daily and " follow-up with PCP to ensure well-controlled.  His LDL was at goal.  Has been told in the past that he has diabetes, current A1c is well-controlled at 6.2%.       -Smoking screen: former smoker, quit 15 years ago  -Alcohol screen: Occasionally up to 2 drinks weekly  -Drug screen: Denies nonprescription medications/herbal supplements/illicit drug use  -Sleep Apnea screen: Has SHAWN, does not recall being prescribed a device.  Endorses snoring and excessive daytime sleepiness along with suboptimally controlled hypertension so recommend follow-up with sleep medicine clinic to evaluate for device need, he is agreeable   -B symptom screen: Denies fevers, night sweats, or unintentional weight loss.                    TIA Evaluation Summarized    MRI and/or Head CT MRI: 1.  No discrete mass lesion, hemorrhage or focal area suggestive of acute ischemia.  2.  No abnormal enhancement.  3.  Diffuse age related changes  CTP: Normal cerebral perfusion.    CT head: 1.  No intracranial hemorrhage, mass lesions, hydrocephalus or CT evidence for an acute infarct. MRI is more sensitive for the evaluation of acute infarcts.  2.  Mild diffuse cerebral parenchymal volume loss. Presumed chronic hypertensive/microvascular ischemic white matter changes.   Intracranial Vasculature MRA brain: 1.  No discrete vessel occlusion, significant stenosis, aneurysm or high flow vascular malformation involving the arteries of the [place].     CTA head:  1.  No hemodynamically significant narrowing of the proximal major intracranial arteries. 2 mm infundibulum at the right middle cerebral artery trifurcation.  2.  Mild atherosclerotic plaque of the cavernous and supraclinoid internal carotid arteries. Mild to moderate atherosclerotic plaque of the intracranial left vertebral artery.    Cervical Vasculature MRA neck: 1.  Normal configuration of the great vessels off the aortic arch with no significant stenosis of their origins.  2.  No  significant stenosis or irregularity involving the arteries of the neck by NASCET criteria.  3.  No radiographic evidence of dissection.    CTA neck:  1.  Mild plaque at both proximal internal carotid arteries. No hemodynamically significant narrowing of the internal carotid arteries bilaterally based on the NASCET criteria.  2.  Mild narrowing of the origin of the left vertebral artery. Multifocal mild narrowings of the proximal V2 segment of the left vertebral artery. Mild narrowing of the distal V2 and V3 segment of the right vertebral artery.     Echocardiogram TTE: Pending   EKG/Telemetry Sinus bradycardia    Other Testing Not Applicable      LDL 2/5/2024: 55 mg/dL   A1C 2/5/2024: 6.2 %       ABCD2 Patients Score   Age ? 60 years 1 point 1   Blood Pressure    SBP ? 140 or DBP ?  90    1 point 1   Clinical Features    - Unilateral weakness    - Speech disturbance w/o weakness    - Other    2 points  1 point    0 points 1   Duration of symptoms    ? 60 minutes    10-59 minutes    < 10 minutes   2 points  1 point  0 points 2   Diabetes  1 point 1   Patient s ABCD2 Score (0-7) = 6        Impression  Transient expressive aphasia x 1 hour, vessel imaging and MRI unremarkable, suspect possible transient ischemic attack (TIA), ABCD2 score 6, suspect cardioembolic given atrial fibrillation not on anticoagulation, [id number] score 7    Recommendations   Acute TIA Management:  -Neuro checks and vitals every 4 hours  - Inpatient SBP goal <180  - TTE pending  -Recommend anticoagulation (preference to Eliquis 5 mg twice daily), pharmacy liaison consult for DOAC co-pay ordered (of note he is on methotrexate so should monitor closely for any bleeding issues and contact provider if any black/tarry stools)  -no need for additional antiplatelets unless recommended by cardiology given history of CAD  -PTA Lipitor 80 mg daily  -telemetry,   -PT/OT/SPT  -Euthermia, euglycemia, eunatremia  -Stroke Education  -Stroke Class per  "[facility])      Stroke prevention:  -follow-up with PCP for titration to goal LDL 40-70, <40 increases risk of Intracranial hemorrhage  -Mediterranean diet can be beneficial for overall decreased cardiovascular risk, please print hand out for patient at discharge   -goal HgbA1c <7% for stroke prevention, follow-up with PCP  -long term outpatient blood pressure goal <130/80, recommend home monitoring twice daily in AM and PM, keep log and bring to PCP follow-up    Discussed with vascular neurology attending, Dr. Medeiros    Patient Follow-up    -Follow-up with PCP in 1-2 weeks  -Follow-up with neurology team in 6-8 weeks (ordered)  - Follow-up sleep medicine clinic to evaluate for sleep apnea device (ordered)    Thank you for this consult.  Will follow peripherally for results of pharmacy liaison consult and TTE.  Please contact us with any questions.    Inez Miller PA-C  Vascular Neurology    To page me or covering stroke neurology team member, click here: [facility]  Choose \"On Call\" tab at top, then select \"NEUROLOGY/ALL SITES\" from middle drop-down box, press Enter, then look for \"stroke\" or \"telestroke\" for your site.  _____________________________________________________    Clinically Significant Risk Factors Present on Admission                # Drug Induced Platelet Defect: home medication list includes an antiplatelet medication   # Hypertension: Noted on problem list          # Financial/Environmental Concerns: none         Past Medical History    Past Medical History:   Diagnosis Date    Anxiety     Atrial fibrillation (H)     Chest pain made worse by breathing 4/21/2016    CIS (carcinoma in situ of bladder) 8/17/2017    Fatigue 5/19/2016    Hematuria 3/1/2017    HLD (hyperlipidemia)     HTN (hypertension)     Psoriasis     RA (rheumatoid arthritis) (H)     Sleep apnea     does not use CPAP     Medications   Home Meds  Prior to Admission medications    Medication Sig Start Date End Date " "Taking? Authorizing Provider   albuterol (PROAIR HFA;PROVENTIL HFA;VENTOLIN HFA) 90 mcg/actuation inhaler Inhale 2 puffs into the lungs every 4 hours as needed 11/25/20  Yes Provider, Historical   atorvastatin (LIPITOR) 80 MG tablet Take 1 tablet (80 mg) by mouth daily 1/3/22  Yes Hailee Pierce MD   blood glucose test strips [BLOOD GLUCOSE TEST STRIPS] Use 1 each As Directed 2 (two) times a day. Dispense brand per patient's insurance at pharmacy discretion. 2/28/19  Yes Dillan Kim MD   clopidogrel (PLAVIX) 75 MG tablet Take 1 tablet (75 mg) by mouth daily Dose to start tomorrow. 4/19/23  Yes Ajay Argueta MD   diltiazem ER COATED BEADS (CARDIZEM CD/CARTIA XT) 180 MG 24 hr capsule Take 1 capsule (180 mg) by mouth daily 3/28/23  Yes Ajay Argueta MD   folic acid (FOLVITE) 1 MG tablet Take 1 mg by mouth daily Do not take on methotrexate days (Tuesday) 1/30/17  Yes Provider, Historical   generic lancets [GENERIC LANCETS] Use 1 each As Directed 4 (four) times a day. 3/6/19  Yes Dillan Kim MD   ketoconazole (NIZORAL) 2 % external shampoo Apply topically twice a week 1/9/23  Yes Reported, Patient   losartan (COZAAR) 100 MG tablet TAKE 1 TABLET(100 MG) BY MOUTH DAILY 5/31/23  Yes Ajay Argueta MD   methotrexate 2.5 MG tablet Take 10 mg by mouth once a week 4 tabs once weekly on Tuesdays 3/1/17  Yes Provider, Historical   metoprolol succinate ER (TOPROL XL) 50 MG 24 hr tablet Take 50 mg by mouth daily 12/7/23  Yes Unknown, Entered By History   nitroGLYcerin (NITROSTAT) 0.4 MG sublingual tablet One tablet under the tongue every 5 minutes if needed for chest pain. May repeat every 5 minutes for a maximum of 3 doses in 15 minutes\" 1/3/22  Yes Hailee Pierce MD   tamsulosin (FLOMAX) 0.4 mg cap [TAMSULOSIN (FLOMAX) 0.4 MG CAP] Take 1 capsule (0.4 mg total) by mouth 2 (two) times a day. 12/28/19  Yes Dillan Kim MD       Scheduled Meds   atorvastatin  80 mg Oral At Bedtime    clopidogrel  75 mg Oral " Daily    sodium chloride (PF)  3 mL Intracatheter Q8H       Infusion Meds   - MEDICATION INSTRUCTIONS -      - MEDICATION INSTRUCTIONS -         Allergies   No Known Allergies       PHYSICAL EXAMINATION   Temp:  [97.6  F (36.4  C)-98.6  F (37  C)] 97.8  F (36.6  C)  Pulse:  [55-85] 79  Resp:  [16-27] 27  BP: (153-187)/(70-86) 157/70  SpO2:  [93 %-97 %] 94 %    General Exam  General:  patient lying in bed without any acute distress    HEENT:  normocephalic/atraumatic  Pulmonary:  no respiratory distress    Neuro Exam  Mental Status:  alert, oriented x 3, follows commands, speech clear and fluent, naming and repetition normal  Cranial Nerves:  visual fields intact (tested by nurse), EOMI with normal smooth pursuit, facial sensation intact and symmetric (tested by nurse), facial movements symmetric, hearing not formally tested but intact to conversation, no dysarthria, tongue protrusion midline  Motor:  no abnormal movements, able to move all limbs antigravity spontaneously with no signs of hemiparesis observed, no pronator drift   Reflexes:  unable to test (telestroke)  Sensory:  light touch sensation intact and symmetric throughout upper and lower extremities (assessed by nurse), no extinction on double simultaneous stimulation (assessed by nurse)  Coordination:  normal finger-to-nose and heel-to-shin bilaterally without dysmetria  Station/Gait:  unable to test due to telestroke    Stroke Scales    NIHSS  1a. Level of Consciousness 0-->Alert, keenly responsive   1b. LOC Questions 0-->Answers both questions correctly   1c. LOC Commands 0-->Performs both tasks correctly   2.   Best Gaze 0-->Normal   3.   Visual 0-->No visual loss   4.   Facial Palsy 0-->Normal symmetrical movements   5a. Motor Arm, Left 0-->No drift, limb holds 90 (or 45) degrees for full 10 secs   5b. Motor Arm, Right 0-->No drift, limb holds 90 (or 45) degrees for full 10 secs   6a. Motor Leg, Left 0-->No drift, leg holds 30 degree position for full  "5 secs   6b. Motor Leg, right 0-->No drift, leg holds 30 degree position for full 5 secs   7.   Limb Ataxia 0-->Absent   8.   Sensory 0-->Normal, no sensory loss   9.   Best Language 0-->No aphasia, normal   10. Dysarthria 0-->Normal   11. Extinction and Inattention  0-->No abnormality   Total 0 (02/06/24 1202)       Imaging  I personally reviewed all imaging; relevant findings per HPI.    Labs Data   CBC  Recent Labs   Lab 02/06/24  0558 02/05/24  1415   WBC 6.9 7.8   RBC 4.16* 4.33*   HGB 12.9* 13.5   HCT 38.0* 39.8*    224     Basic Metabolic Panel   Recent Labs   Lab 02/06/24  0846 02/06/24  0558 02/05/24  2254 02/05/24  1415   NA  --  140  --  140   POTASSIUM  --  4.0  --  4.4   CHLORIDE  --  106  --  105   CO2  --  23  --  24   BUN  --  11.6  --  11.9   CR  --  1.07  --  1.13   * 119* 117* 96   HARMEET  --  9.3  --  9.3     Liver Panel  No results for input(s): \"PROTTOTAL\", \"ALBUMIN\", \"BILITOTAL\", \"ALKPHOS\", \"AST\", \"ALT\", \"BILIDIRECT\" in the last 168 hours.  INR    Recent Labs   Lab Test 02/05/24  1415 01/03/22  0617 02/07/19  1428   INR 1.08 0.95 1.04           Stroke Consult Data Data   Telestroke Service Details  (for non-emergent stroke consult with tele)  Video start time 02/06/24   1201   Video end time 02/06/24   1236   Type of service telemedicine diagnostic assessment of acute neurological changes   Reason telemedicine is appropriate patient requires assessment with a specialist for diagnosis and treatment of neurological symptoms   Mode of transmission secure interactive audio and video communication per Rosalind   Originating site (patient location) [facility]    Distant site (provider location) [facility]       I have personally spent a total of 70 minutes providing care today, time spent in reviewing medical records and devising the plan as recorded above.     *All or a portion of this note was generated using voice " recognition software and may contain transcription errors.

## 2024-02-06 NOTE — CONSULTS
Care Management Initial Consult    General Information  Assessment completed with: Patient, VM-chart review,    Type of CM/SW Visit: Initial Assessment    Primary Care Provider verified and updated as needed: Yes   Readmission within the last 30 days: no previous admission in last 30 days      Reason for Consult: discharge planning  Advance Care Planning: Advance Care Planning Reviewed:  (no HCD on file and declines offer for blank HCD)          Communication Assessment  Patient's communication style: spoken language (English or Bilingual)             Cognitive  Cognitive/Neuro/Behavioral: WDL                      Living Environment:   People in home: significant other (lived together for 19 years)     Current living Arrangements: house      Able to return to prior arrangements: yes       Family/Social Support:  Care provided by: self  Provides care for: no one  Marital Status: Lives with Significant Other  Significant Other, Children       Ellen  Description of Support System: Supportive, Involved         Current Resources:   Patient receiving home care services: No     Community Resources: None  Equipment currently used at home:    Supplies currently used at home: None    Employment/Financial:  Employment Status: retired        Financial Concerns: none   Referral to Financial Worker: No       Does the patient's insurance plan have a 3 day qualifying hospital stay waiver?  Yes     Which insurance plan 3 day waiver is available? Alternative insurance waiver    Will the waiver be used for post-acute placement? No    Lifestyle & Psychosocial Needs:  Social Determinants of Health     Food Insecurity: Not on file   Depression: Not on file   Housing Stability: Not on file   Tobacco Use: Medium Risk (2/28/2023)    Patient History     Smoking Tobacco Use: Former     Smokeless Tobacco Use: Never     Passive Exposure: Not on file   Financial Resource Strain: Not on file   Alcohol Use: Not on file   Transportation Needs:  "Not on file   Physical Activity: Not on file   Interpersonal Safety: Not on file   Stress: Not on file   Social Connections: Not on file       Functional Status:  Prior to admission patient needed assistance:   Dependent ADLs:: Independent  Dependent IADLs:: Independent       Mental Health Status:  Mental Health Status: No Current Concerns       Chemical Dependency Status:  Chemical Dependency Status: No Current Concerns             Values/Beliefs:  Spiritual, Cultural Beliefs, Restorationist Practices, Values that affect care: no               Additional Information:  Chart reviewed. Neurology consult today. PT/OT/speech pending.     CM met with patient. CM provided COELLO/observation notice and education related thereto. Has [employer] plan. He verbalized understanding and signed. Original in chart. He declined a copy.     Lives with S/O in private home. He reports independent with all cares at baseline, including driving. No private duty or skilled HC services.     He reports plan to return home at hospital discharge. He did state his S/O is currently out of town for work but he thinks he can manage \"just fine\". He currently declines HC services. States plan to drive self home- car in hospital parking lot. He did state he could likely get someone to pick him up if not able to drive himself home.    April Beasley RN      "

## 2024-02-06 NOTE — PROGRESS NOTES
"Clinical Swallow Evaluation- Speech Language Pathology       02/06/24 1015   Appointment Info   Signing Clinician's Name / Credentials (SLP) HEATHER Roth, Student Clinician  (Simultaneous filing. User may not have seen previous data.)   Student Supervision Direct supervision provided  (Simultaneous filing. User may not have seen previous data.)   General Information   Onset of Illness/Injury or Date of Surgery 02/05/24  (Simultaneous filing. User may not have seen previous data.)   Referring Physician Lucio Barnes MD  (Simultaneous filing. User may not have seen previous data.)   Pertinent History of Current Problem Per chart review, \"Koko Ronquillo is a 78 year old male admitted on 2/5/2024. He has a pmhx of hyperlipidemia, cad s/p RANCHO in both right coronary and LAD on plavix in Jan 2022, atrial fibrillation, bladder cancer, hypertension, anxiety, DM2, SHAWN, rheumatoid arthritis. Recently here 12/2022 for chest pain that resolved. Presents 2/5/2024 for concern of stroke.\" Completed clinical swallow evaluation per MD orders due to concern of stroke.  (Simultaneous filing. User may not have seen previous data.)   General Observations Pt sleeping when SLP arrived, was easily awakened. Pt sat up in bed and was alert and cooperative.  (Simultaneous filing. User may not have seen previous data.)   Type of Evaluation   Type of Evaluation Swallow Evaluation  (Simultaneous filing. User may not have seen previous data.)   Oral Motor   Oral Musculature generally intact  (Simultaneous filing. User may not have seen previous data.)   Mucosal Quality adequate  (Simultaneous filing. User may not have seen previous data.)   Dentition (Oral Motor)   Comment, Dentition (Oral Motor) Pt reports dentures fit well.  (Simultaneous filing. User may not have seen previous data.)   Dentition (Oral Motor) adequate dentition;dental appliance/dentures  (Simultaneous filing. User may not have seen previous data.)   Facial Symmetry (Oral " Motor)   Facial Symmetry (Oral Motor) WNL;right side impairment  (Simultaneous filing. User may not have seen previous data.)   Comment, Facial Symmetry (Oral Motor) Questionable slight right facial droop. Very minimal.   Right Side Facial Asymmetry minimal impairment  (Simultaneous filing. User may not have seen previous data.)   Lip Function (Oral Motor)   Lip Range of Motion (Oral Motor) WNL  (Simultaneous filing. User may not have seen previous data.)   Lip Strength (Oral Motor) WNL  (Simultaneous filing. User may not have seen previous data.)   Comment, Lip Function (Oral Motor) Lip function was WNL, minimal impairment of ROM for R side when speaking.  (Simultaneous filing. User may not have seen previous data.)   Tongue Function (Oral Motor)   Tongue Strength (Oral Motor) WNL  (Simultaneous filing. User may not have seen previous data.)   Tongue Coordination/Speed (Oral Motor) WNL  (Simultaneous filing. User may not have seen previous data.)   Tongue ROM (Oral Motor) WNL  (Simultaneous filing. User may not have seen previous data.)   Comment, Tongue Function (Oral Motor) Lingual function was WNL  (Simultaneous filing. User may not have seen previous data.)   Jaw Function (Oral Motor)   Jaw Function (Oral Motor) WNL  (Simultaneous filing. User may not have seen previous data.)   Cough/Swallow/Gag Reflex (Oral Motor)   Volitional Throat Clear/Cough (Oral Motor) WNL  (Simultaneous filing. User may not have seen previous data.)   Volitional Swallow (Oral Motor) WNL  (Simultaneous filing. User may not have seen previous data.)   Vocal Quality/Secretion Management (Oral Motor)   Vocal Quality (Oral Motor) hoarse;wet/gurgly   Secretion Management (Oral Motor) WNL  (Simultaneous filing. User may not have seen previous data.)   Comment, Vocal Quality/Secretion Management (Oral Motor) Pt presented with mildly wet vocal quality at baseline (i.e., prior to PO trial). This persisted throughout session, but did not worsen  with PO intake. Vocal quality was also noted to be mildly hoarse, likely also baseline.   General Swallowing Observations   Past History of Dysphagia None per patient report. None per cursory review of EMR.  (Simultaneous filing. User may not have seen previous data.)   Respiratory Support room air  (Simultaneous filing. User may not have seen previous data.)   Current Diet/Method of Nutritional Intake (General Swallowing Observations, NIS) thin liquids (level 0);regular diet  (Simultaneous filing. User may not have seen previous data.)   Swallowing Evaluation Clinical swallow evaluation  (Simultaneous filing. User may not have seen previous data.)   Clinical Swallow Evaluation   Feeding Assistance no assistance needed  (Simultaneous filing. User may not have seen previous data.)   Clinical Swallow Evaluation Textures Trialed thin liquids;solid foods   Clinical Swallow Eval: Thin Liquid Texture Trial   Mode of Presentation, Thin Liquids cup;straw;self-fed  (Simultaneous filing. User may not have seen previous data.)   Volume of Liquid or Food Presented >4 ounces  (Simultaneous filing. User may not have seen previous data.)   Oral Phase of Swallow WFL  (Simultaneous filing. User may not have seen previous data.)   Pharyngeal Phase of Swallow intact  (Simultaneous filing. User may not have seen previous data.)   Diagnostic Statement Subjectively, swallow response appeared timely. No overt clinical s/sx of aspiration observed.  (Simultaneous filing. User may not have seen previous data.)   Clinical Swallow Evaluation: Solid Food Texture Trial   Mode of Presentation self-fed  (Simultaneous filing. User may not have seen previous data.)   Volume Presented 3 bites  (Freeman cracker  )   Oral Phase WFL  (Simultaneous filing. User may not have seen previous data.)   Pharyngeal Phase intact  (Simultaneous filing. User may not have seen previous data.)   Diagnostic Statement Patient demonstrated effective and timely  mastication and good oral clearance. No overt clinical s/sx of aspiration observed.  (Simultaneous filing. User may not have seen previous data.)   Esophageal Phase of Swallow   Patient reports or presents with symptoms of esophageal dysphagia No  (Simultaneous filing. User may not have seen previous data.)   Swallowing Recommendations   Diet Consistency Recommendations regular diet;thin liquids (level 0)  (Simultaneous filing. User may not have seen previous data.)   Supervision Level for Intake patient independent  (Simultaneous filing. User may not have seen previous data.)   Mode of Delivery Recommendations bolus size, small;slow rate of intake  (Simultaneous filing. User may not have seen previous data.)   Monitoring/Assistance Required (Eating/Swallowing) stop eating activities when fatigue is present  (Simultaneous filing. User may not have seen previous data.)   Recommended Feeding/Eating Techniques (Swallow Eval) maintain upright sitting position for eating;minimize distractions during oral intake  (Simultaneous filing. User may not have seen previous data.)   Medication Administration Recommendations, Swallowing (SLP) As tolerated  (Simultaneous filing. User may not have seen previous data.)   Instrumental Assessment Recommendations instrumental evaluation not recommended at this time  (Simultaneous filing. User may not have seen previous data.)   Clinical Impression   Criteria for Skilled Therapeutic Interventions Met (SLP Eval) Evaluation only  (Simultaneous filing. User may not have seen previous data.)   Risks & Benefits of therapy have been explained evaluation/treatment results reviewed;participants voiced agreement with care plan;participants included;patient;care plan/treatment goals reviewed  (Simultaneous filing. User may not have seen previous data.)   Clinical Impression Comments Clinical swallow evaluation completed per provider order. No oral dysphagia observed with the consistencies given.  There was no direct evidence of pharyngeal dysphagia during this evaluation. Patient appears appropriate for diet of Regular textures and thin liquids with use of the safe swallowing precautions listed above. Speech/Language evaluation deferred, as pt's speech is clear (i.e. no dysarthria noted) and his expressive/receptive language skills appear to be at baseline. Pt denies any speech/language concerns. There is no indication for ongoing skilled Speech Therapy services at this time. Will sign off, but remain available if new concerns arise.  (Simultaneous filing. User may not have seen previous data.)   SLP Total Evaluation Time   Eval: oral/pharyngeal swallow function, clinical swallow Minutes (23495) 15  (Simultaneous filing. User may not have seen previous data.)   SLP Discharge Planning   SLP Plan Further Speech Therapy is not indicated at this time. Will sign off and complete current order. Please re-consult if new concerns arise.  (Simultaneous filing. User may not have seen previous data.)   SLP Discharge Recommendation home  (Simultaneous filing. User may not have seen previous data.)   SLP Rationale for DC Rec Patient's swallow function appears at or near baseline.  (Simultaneous filing. User may not have seen previous data.)   SLP Brief overview of current status  Patient appears appropriate for diet of Regular textures and thin liquids. To maximize safety of oral intake, patient should sit fully upright (preferably in chair) for all intake, eat slowly, take small bites/sips, and chew foods thoroughly.  (Simultaneous filing. User may not have seen previous data.)   Total Session Time   Total Session Time (sum of timed and untimed services) 15      Adequate: hears normal conversation without difficulty

## 2024-02-06 NOTE — PROGRESS NOTES
Pt A&Ox3. Vitals WNL. Tele: SB/SR. Neuro WNL. Pt resting in between cares. No c/o of pain. Call light appropriate.

## 2024-02-06 NOTE — PROGRESS NOTES
Pt A&Ox3. Vitals WNL. Tele: SB/SR. Neuro WNL. Pt sleeping in between cares. No c/o of pain. Call light appropriate.

## 2024-02-07 ENCOUNTER — PATIENT OUTREACH (OUTPATIENT)
Dept: CARE COORDINATION | Facility: CLINIC | Age: 79
End: 2024-02-07
Payer: COMMERCIAL

## 2024-02-07 NOTE — PROGRESS NOTES
Clinic Care Coordination Contact  [facility]: Post-Discharge Note  SITUATION                                                      Admission:    Admission Date: 02/05/24   Reason for Admission: Aphasia  Discharge:   Discharge Date: 02/06/24  Discharge Diagnosis: Aphasia, Difficulty reading, bilateral frontal headache, Concern for TIA, all symptoms resolved by admission    BACKGROUND                                                      Per hospital discharge summary and inpatient provider notes:    Koko Ronquillo is a 78 year old male who has a pmhx of hyperlipidemia, cad s/p RANCHO in both right coronary and LAD on plavix in Jan 2022, atrial fibrillation, bladder cancer, hypertension, anxiety, DM2, SHAWN, rheumatoid arthritis. Recently here 12/2022 for chest pain that resolved. Presents 2/5/2024 for concern of stroke.     Presentation of aphasia at 1pm when he received a call and could not reply to the caller, then he proceeded to google stroke symptoms but could not read his phone; notably within the hour or so of his symptoms beginning they completely resolved. He did have a bilateral frontal headache during this time but that has resolved too when reached ER. Only feeling slightly dizzy on admission, though ambulated in room. Of note this history is different than what the ER sign-out and Neuro note states: last known well 8am, 10am  trouble reading and speaking symptoms started doing computer work. Pt reaffirmed to writer x3 times that their history is inaccurate and as aforementioned symptoms started at 1pm when he picked up phone call.      On admit is vitally 171/77 o/w stable, labs include normal BMP, troponin neg, no leukocytosis and hgb 13.5. CT head showed presumed chronic microvascular ischemic changes but no perfusion defects on CT and CTA showed plaque in internal carotids mild narrowing of left vertebral, V2 and V3 segment of right vertebral artery. Neurology recommended MRI.     On interview, the  "patient confirms the aforementioned and also reports the symptoms started at 1pm as above (again he stated the 8am last know well is inaccurate; he was driving his SO at 8am to airport); no other symptoms noted including no persistent or recurrent headaches, fevers, chills, chest pain or shortness of breath including tachypnea dyspnea or coughs, no abdominal pain, no diarrhea or constipation, no dysuria, no neurologic changes or sensory changes. The pt is Full code. Lives with an SO who is now in [place] for work (hence whey he dropped off at airport at 8), no exposure to sick contacts or children or pets. From [place].      ASSESSMENT      Discharge Assessment  How are you doing now that you are home?: \"Good. I'm feeling pretty good, I am feeling normal.\"  How are your symptoms? (Red Flag symptoms escalate to triage hotline per guidelines): Improved  Do you feel your condition is stable enough to be safe at home until your provider visit?: Yes  Does the patient have their discharge instructions? : Yes  Does the patient have questions regarding their discharge instructions? : No  Were you started on any new medications or were there changes to any of your previous medications? : No  Does the patient have all of their medications?: Yes  Do you have questions regarding any of your medications? : No  Do you have all of your needed medical supplies or equipment (DME)?  (i.e. oxygen tank, CPAP, cane, etc.): Yes  Discharge follow-up appointment scheduled within 14 calendar days? : Yes  Discharge Follow Up Appointment Date: 02/08/24  Discharge Follow Up Appointment Scheduled with?: Primary Care Provider    Post-op ([facility] Only)  If the patient had a surgery or procedure, do they have any questions for a nurse?: No    PLAN                                                      Outpatient Plan:      Follow-up and recommended labs and tests  Follow up with primary care provider, Jelani Shabazz, within 7 days for hospital " follow- up.  -suspected to be Transient Ischemic Attack or TIA  -recommended starting anticoagulation with a medication such as Apixaban/Eliquis or Xarelto. However, he declined this and opted instead to see his primary care provider within 3 days for follow-up and revisit that topic.  -Both Physical and Occupational Therapy cleared him to return home but recommended outpatient-physical therapy for balance.    Additional Services:     Adult Sleep Eval & Management Referral  Please be aware that coverage of these services is subject to the terms and limitations of your health insurance plan. Call member services at your health plan with any benefit or coverage questions.  [employer] will call you to coordinate your care as prescribed by your provider. If you don't hear from a representative within 2 business days, please call [phone].  Reason for Referral: Sleep Apnea  Scheduling Instructions: [employer] will call you to coordinate your care as prescribed by your provider. If you don't hear from a representative within 2 business days, please call [phone].  Additional Information: SHAWN, HTN, TIA, evaluate for device    Physical Therapy Referral  Please be aware that coverage of these services is subject to the terms and limitations of your health insurance plan. Call member services at your health plan with any benefit or coverage questions.  If you have not heard from the scheduling office within 2 business days, please call [phone] for [employer], [phone] for [employer] and [phone] for [place].  Preferred Location: [facility]  Scheduling Instructions: If you have not heard from the scheduling office within 2 business days, please call [phone] for [employer], [phone] for [employer] and [phone] for [place].  Course of Action: Evaluation and Treatment  Adult or Pediatrics: Adult  Specialty Services: Per Associated  Diagnosis  Additional Information: TIA, can work on balance, thank you    Future tests that were ordered for you:   Stroke Hospital Follow Up (for neurologist use only)  [facility] will call you to coordinate care as prescribed by your provider. If you don t hear from a representative within 2 business days, please call [phone].    Future Appointments   Date Time Provider Department Center   2/13/2024  8:45 AM Devon Aceves, PT [facility]   2/20/2024 11:45 AM Devon Aceves, PT [facility]   2/27/2024 11:45 AM Devon Aceves, PT [facility]         For any urgent concerns, please contact our 24 hour nurse triage line: [phone] ([phone])         HOANG Perez  [phone]  Connected Care Resource [facility]

## 2024-02-13 ENCOUNTER — THERAPY VISIT (OUTPATIENT)
Dept: PHYSICAL THERAPY | Facility: REHABILITATION | Age: 79
End: 2024-02-13
Attending: STUDENT IN AN ORGANIZED HEALTH CARE EDUCATION/TRAINING PROGRAM
Payer: COMMERCIAL

## 2024-02-13 DIAGNOSIS — M54.50 LOW BACK PAIN, UNSPECIFIED BACK PAIN LATERALITY, UNSPECIFIED CHRONICITY, UNSPECIFIED WHETHER SCIATICA PRESENT: Primary | ICD-10-CM

## 2024-02-13 DIAGNOSIS — G45.9 TIA (TRANSIENT ISCHEMIC ATTACK): ICD-10-CM

## 2024-02-13 PROCEDURE — 97161 PT EVAL LOW COMPLEX 20 MIN: CPT | Mod: GP

## 2024-02-13 PROCEDURE — 97112 NEUROMUSCULAR REEDUCATION: CPT | Mod: GP

## 2024-02-13 NOTE — PROGRESS NOTES
PHYSICAL THERAPY EVALUATION  Type of Visit: Evaluation    See electronic medical record for Abuse and Falls Screening details.    Subjective       Koko reports that on 2/5/24, he noticed that his speech was impaired, so he went into the ED, and they diagnosed him with a TIA. Imaging was largely unremarkable by the time testing was completed. His symptoms had also largely resolved by the time he got to the ED. He continues to work with his primary care provider to manage him medically. They did want to admit him to the hospital to develop a plan of care, but chose to leave. He denies any current TIA-related symptoms, but he was referred to PT for improved balance and general strength/endurance. He does report that he has had low back pain for 3-5 years now. He said that he is able to walk for 10 minutes without pain, but he needs to sit down by 20 minutes due to notable pain. After sitting for a few minutes, he is able to walk again without issue. Worst pain: 6-7/10. Best pain: 0/10. Current pain: 0/10. He denies any aggravating factors other than extended walking. He denies any pain, numbness, or tingling down the legs. He denies any bowel or bladder issues other than in relation to history of bladder cancer. He also reports cervical AROM limitations.  Presenting condition or subjective complaint: Balance, lower back  Date of onset: 02/05/24    Relevant medical history: Arthritis; Bladder or bowel problems; Cancer; High blood pressure; Overweight; Stroke   Dates & types of surgery:      Prior diagnostic imaging/testing results:       Prior therapy history for the same diagnosis, illness or injury: Yes      Prior Level of Function  Transfers: Independent  Ambulation: Independent  ADL: Independent  IADL:  independent    Living Environment  Social support: With a significant other or spouse   Type of home: House; 1 level   Stairs to enter the home: Yes 3     Ramp:     Stairs inside the home: Yes 0 (To basement)      Help at home: None  Equipment owned:       Employment: No    Hobbies/Interests:      Patient goals for therapy: Walk a mile    Pain assessment:  see subjective report     Objective      Cognitive Status Examination  Orientation: Oriented to person, place and time   Level of Consciousness: Alert  Follows Commands and Answers Questions: 100% of the time, Follows multi step instructions  Personal Safety and Judgement: Intact  Memory: Intact    OBSERVATION:   INTEGUMENTARY:   POSTURE:   PALPATION:   RANGE OF MOTION:   STRENGTH:   Pain: - none + mild ++ moderate +++ severe  Strength Scale: 0-5/5 Left Right   Ankle Dorsiflexion 5 5   Ankle Plantarflexion 5 5   Ankle Inversion     Ankle Eversion     Great Toe Flexion     Great Toe Extension 5 5   Anterior Tibialis     Posterior Tibialis     Peroneals     Extensor Digitorum     Gastroc/Soleus       Pain: - none + mild ++ moderate +++ severe  Strength Scale: 0-5/5 Left Right   Hip Flexion 5 5   Hip Extension     Hip Abduction 5 5   Hip Adduction 5 5   Hip Internal Rotation     Hip External Rotation     Knee Flexion 5 5   Knee Extension 5 5     Pain: - none + mild ++ moderate +++ severe  Strength Scale: 0-5/5 Left Right   Shoulder Flexion 5 5   Shoulder Extension     Shoulder Abduction 5 5   Shoulder Adduction     Shoulder Internal Rotation 5 5   Shoulder External Rotation 5 4+   Shoulder Horizontal Abduction     Shoulder Horizontal Adduction     Elbow Flexion 4 4   Elbow Extension 5 5   Mid Trap     Lower Trap     Rhomboid     Serratus Anterior     -Wrist extension, thumb abduction, and 5th finger adduction: 5/5 bilaterally    BED MOBILITY:     TRANSFERS:     WHEELCHAIR MOBILITY:     GAIT:   Level of [place]:   Assistive Device(s):   Gait Deviations: Base of support increased  Opal decreased  Minor shuffle  Gait Distance:   Stairs:     BALANCE:     SPECIAL TESTS  Functional Gait Assessment (FGA)      10 Meter Walk Test (Comfortable)     10 Meter Walk Test (Fast)      6 Minute Walk Test (6MWT)           Sam Balance Scale (BBS)     5 Times Sit-to-Stand (5TSTS)       Dynamic Gait Index (DGI)     Timed Up and Go (TUG) - sec    Single Leg Stance Right (sec)    Single Leg Stance Left (sec)    Modified CTSIB Conditions (sec) Cond 1:   Cond 2:   Cond 4:   Cond 5 :    Romberg  (sec) Eyes open: 30+ seconds (min sway). Eyes closed: 1 second.   Sharpened Romberg (sec)    30 Second Sit to Stand (reps/height) 8 reps/standard chair           SENSATION: UE Sensation WNL, LE Sensation WNL    REFLEXES:   - Ankle clonus and Segura's: negative bilaterally  COORDINATION:   MUSCLE TONE:         Assessment & Plan   CLINICAL IMPRESSIONS  Medical Diagnosis: TIA (transient ischemic attack) (TIA, can work on balance, thank you)    Treatment Diagnosis: Impaired functional mobility, balance, gait, and endurance   Impression/Assessment: Patient is a 78 year old male with balance impairments and low back pain complaints.  The following significant findings have been identified: Pain, Decreased strength, Impaired gait, Impaired muscle performance, and Decreased activity tolerance. These impairments interfere with their ability to perform self care tasks, recreational activities, household chores, and community mobility as compared to previous level of function.     Clinical Decision Making (Complexity):  Clinical Presentation: Stable/Uncomplicated  Clinical Presentation Rationale: based on medical and personal factors listed in PT evaluation  Clinical Decision Making (Complexity): Low complexity    PLAN OF CARE  Treatment Interventions:  Interventions: Gait Training, Manual Therapy, Neuromuscular Re-education, Therapeutic Activity, Therapeutic Exercise, Self-Care/Home Management    Long Term Goals     PT Goal 1  Goal Identifier: HEP  Goal Description: Koko will demonstrate mastery of ([id number] need for cueing) and compliance with (completion on at least 5/7 days a week) his home exercise program to  facilitate increased rate of improvement.  Goal Progress: In progress  Target Date: 03/12/24  PT Goal 2  Goal Identifier: LEFS  Goal Description: Koko will demonstrate significantly improved function as evidenced by an improved LEFS score of at least 55/80.  Goal Progress: 46/80  Target Date: 04/09/24  PT Goal 3  Goal Identifier: Walking  Goal Description: Koko will demonstrate improved tolerance to functional mobility as evidenced by his ability to walk up to 30 minutes with self-report back pain no more than 2/10.  Goal Progress: 10 minutes no pain; 20 minutes high pain  Target Date: 04/09/24  PT Goal 4  Goal Identifier: 30 Second Sit-to-stand  Goal Description: Parvin will demonstrate increased lower extremity strength and improved tolerance to functional transfers as evidenced by an improved 30 second sit-to-stand score of at least 13/30.  Goal Progress: 8 reps/standard chair  Target Date: 04/09/24      Frequency of Treatment: 1 time per week  Duration of Treatment: 10 weeks    Recommended Referrals to Other Professionals:   Education Assessment:   Learner/Method: Patient;Listening;Demonstration;Pictures/Video;No Barriers to Learning    Risks and benefits of evaluation/treatment have been explained.   Patient/Family/caregiver agrees with Plan of Care.     Evaluation Time:     PT Eval, Low Complexity Minutes (76821): 25       Signing Clinician: Devon Aceves, PT      [facility]                                                                                   OUTPATIENT PHYSICAL THERAPY      PLAN OF TREATMENT FOR OUTPATIENT REHABILITATION   Patient's Last Name, First Name, LAVERNE FullerKoko colon YOB: 1945   Provider's Name   [facility]   Medical Record No.  [id number]     Onset Date: 02/05/24  Start of Care Date: 02/13/24     Medical Diagnosis:  TIA (transient ischemic attack) (TIA, can work on balance, thank you)      PT  Treatment Diagnosis:  Impaired functional mobility, balance, gait, and endurance Plan of Treatment  Frequency/Duration: 1 time per week/ 10 weeks    Certification date from 02/13/24 to 04/09/24         See note for plan of treatment details and functional goals     Devon Aceves, PT                         I CERTIFY THE NEED FOR THESE SERVICES FURNISHED UNDER        THIS PLAN OF TREATMENT AND WHILE UNDER MY CARE     (Physician attestation of this document indicates review and certification of the therapy plan).              Referring Provider:  Lucio Barnes    Initial Assessment  See Epic Evaluation- Start of Care Date: 02/13/24

## 2024-02-20 ENCOUNTER — THERAPY VISIT (OUTPATIENT)
Dept: PHYSICAL THERAPY | Facility: REHABILITATION | Age: 79
End: 2024-02-20
Attending: STUDENT IN AN ORGANIZED HEALTH CARE EDUCATION/TRAINING PROGRAM
Payer: COMMERCIAL

## 2024-02-20 DIAGNOSIS — G45.9 TIA (TRANSIENT ISCHEMIC ATTACK): Primary | ICD-10-CM

## 2024-02-20 PROCEDURE — 97112 NEUROMUSCULAR REEDUCATION: CPT | Mod: GP

## 2024-02-20 PROCEDURE — 97110 THERAPEUTIC EXERCISES: CPT | Mod: GP

## 2024-02-20 PROCEDURE — 97750 PHYSICAL PERFORMANCE TEST: CPT | Mod: GP

## 2024-02-24 DIAGNOSIS — I10 ESSENTIAL HYPERTENSION: ICD-10-CM

## 2024-02-24 DIAGNOSIS — I71.40 ABDOMINAL AORTIC ANEURYSM (AAA) WITHOUT RUPTURE (H): ICD-10-CM

## 2024-02-26 RX ORDER — LOSARTAN POTASSIUM 100 MG/1
TABLET ORAL
Qty: 90 TABLET | Refills: 1 | Status: SHIPPED | OUTPATIENT
Start: 2024-02-26 | End: 2024-08-21

## 2024-02-27 ENCOUNTER — THERAPY VISIT (OUTPATIENT)
Dept: PHYSICAL THERAPY | Facility: REHABILITATION | Age: 79
End: 2024-02-27
Attending: STUDENT IN AN ORGANIZED HEALTH CARE EDUCATION/TRAINING PROGRAM
Payer: COMMERCIAL

## 2024-02-27 DIAGNOSIS — G45.9 TIA (TRANSIENT ISCHEMIC ATTACK): Primary | ICD-10-CM

## 2024-02-27 PROCEDURE — 97110 THERAPEUTIC EXERCISES: CPT | Mod: GP

## 2024-02-27 PROCEDURE — 97112 NEUROMUSCULAR REEDUCATION: CPT | Mod: GP

## 2024-03-05 ENCOUNTER — THERAPY VISIT (OUTPATIENT)
Dept: PHYSICAL THERAPY | Facility: REHABILITATION | Age: 79
End: 2024-03-05
Payer: COMMERCIAL

## 2024-03-05 DIAGNOSIS — G45.9 TIA (TRANSIENT ISCHEMIC ATTACK): Primary | ICD-10-CM

## 2024-03-05 PROCEDURE — 97110 THERAPEUTIC EXERCISES: CPT | Mod: GP

## 2024-03-05 PROCEDURE — 97112 NEUROMUSCULAR REEDUCATION: CPT | Mod: GP

## 2024-03-13 ENCOUNTER — TELEPHONE (OUTPATIENT)
Dept: CARDIOLOGY | Facility: CLINIC | Age: 79
End: 2024-03-13
Payer: COMMERCIAL

## 2024-03-13 NOTE — TELEPHONE ENCOUNTER
[facility] Call Center    Phone Message    May a detailed message be left on voicemail: yes     Reason for Call: Other: Koko would like a call back to clarify whether or not it is okay to take the methotrexate and aspirin in conjunction with the eliquis.  Also, he would like to know if he should discontinue plavix.     Action Taken: Other: Cardiology    Travel Screening: Not Applicable    Thank you!  [facility]

## 2024-03-18 NOTE — TELEPHONE ENCOUNTER
===View-only below this line===  ----- Message -----  From: Estela French [facility]  Sent: 3/18/2024  11:57 AM CDT  To: Diego Cade RN    Hello,   No increased risk of bleeding with apixiban and methotrexate. No concerns with aspirin and methotrexate, provided aspirin is the preventative dosage and methotrexate is at lower dosages used for rheumatological disorders, not chemotherapeutic dosages.    Estela French, Pharm.D, BCACP  Medication Therapy Management Pharmacist

## 2024-03-18 NOTE — TELEPHONE ENCOUNTER
Pharmacist, please review Methotrexate/Aspirin/Eliquis. Any contraindications? Increased risk of bleeding? Please advise. Thank you Will then route to [facility] for review. BENSON MAX

## 2024-03-19 ENCOUNTER — THERAPY VISIT (OUTPATIENT)
Dept: PHYSICAL THERAPY | Facility: REHABILITATION | Age: 79
End: 2024-03-19
Payer: COMMERCIAL

## 2024-03-19 DIAGNOSIS — G45.9 TIA (TRANSIENT ISCHEMIC ATTACK): Primary | ICD-10-CM

## 2024-03-19 PROCEDURE — 97110 THERAPEUTIC EXERCISES: CPT | Mod: GP

## 2024-03-19 PROCEDURE — 97750 PHYSICAL PERFORMANCE TEST: CPT | Mod: GP

## 2024-03-19 NOTE — PROGRESS NOTES
__________________________________________________________      [facility] - [place]   [phone]  __________________________________________________________         History of Present Illness   Chief Complaint: Patient presents with:  Tia (Transient Ischemic Attack): TIA      Koko Ronquillo is a 78 year old male presenting for follow-up for TIA.     He was hospitalized at [facility] on 2/5/24. Prior to the hospital stay, he had a past medical history of atrial fibrillation not on anticoagulation (not on AC per last cardiology note 2/2023 due to no known recent afib, some report of intermittent palpitations), HTN, HLD, CAD, SHAWN, DM2, RA, complex endovascular aortic aneurysm repair.    He presented to the hospital with difficulty speaking and reading words on his computer. Symptoms last about 1 hour before resolving. He was recommended to start Eliquis for stroke prevention in the setting of atrial fibrillation, but wanted to consider it further. In the meantime, he was discharged on DAPT with ASA + Plavix.     Since discharge, he reports that he has felt at baseline. No additional episodes of language impairment. Discussed methotrexate and anticoagulation with rheumatology and PCP and elected to proceed with Eliquis. He has not started the Eliquis yet, as he had lingering questions about taking it with his methotrexate. Per pharmacy note 3/18: No increased risk of bleeding with apixiban and methotrexate. No concerns with aspirin and methotrexate, provided aspirin is the preventative dosage and methotrexate is at lower dosages used for rheumatological disorders, not chemotherapeutic dosages.     Eliquis prescription was $300. He plans to call insurance to find out if another DOAC has better coverage. Hasn't checked BP in a while, but it was previously 150/80s. BP in clinic is high, he reports that he was anxious about driving and getting to [place].     Modified Stephan  Scale  Score: 0-No symptoms    Stroke Evaluation Summarized:  New results resulted and reviewed by me today are in BOLD below.  I personally reviewed the following neuroimaging studies today and the comments above reflect my own personal interpretation of the images:   images: MRI brain  MRI and/or Head CT MRI: 1.  No discrete mass lesion, hemorrhage or focal area suggestive of acute ischemia.  2.  No abnormal enhancement.  3.  Diffuse age related changes  CTP: Normal cerebral perfusion.    CT head: 1.  No intracranial hemorrhage, mass lesions, hydrocephalus or CT evidence for an acute infarct. MRI is more sensitive for the evaluation of acute infarcts.  2.  Mild diffuse cerebral parenchymal volume loss. Presumed chronic hypertensive/microvascular ischemic white matter changes.   Intracranial Vasculature MRA brain: 1.  No discrete vessel occlusion, significant stenosis, aneurysm or high flow vascular malformation involving the arteries of the [place].     CTA head:  1.  No hemodynamically significant narrowing of the proximal major intracranial arteries. 2 mm infundibulum at the right middle cerebral artery trifurcation.  2.  Mild atherosclerotic plaque of the cavernous and supraclinoid internal carotid arteries. Mild to moderate atherosclerotic plaque of the intracranial left vertebral artery.    Cervical Vasculature MRA neck: 1.  Normal configuration of the great vessels off the aortic arch with no significant stenosis of their origins.  2.  No significant stenosis or irregularity involving the arteries of the neck by NASCET criteria.  3.  No radiographic evidence of dissection.     CTA neck:  1.  Mild plaque at both proximal internal carotid arteries. No hemodynamically significant narrowing of the internal carotid arteries bilaterally based on the NASCET criteria.  2.  Mild narrowing of the origin of the left vertebral artery. Multifocal mild narrowings of the proximal V2 segment of the left vertebral  "artery. Mild narrowing of the distal V2 and V3 segment of the right vertebral artery.      Echocardiogram TTE: Pending   EKG/Telemetry Sinus bradycardia    Other Testing Not Applicable      Labs Lab Results   Component Value Date    LDL 55 02/05/2024    A1C 6.2 (H) 02/05/2024    CTROPT 15 02/05/2024    INR 1.08 02/05/2024    INR 0.95 01/03/2022               Home Medications     Outpatient Medications Marked as Taking for the 3/20/24 encounter (Office Visit) with Edilma Cervantes CNP   Medication Sig    albuterol (PROAIR HFA;PROVENTIL HFA;VENTOLIN HFA) 90 mcg/actuation inhaler Inhale 2 puffs into the lungs every 4 hours as needed    atorvastatin (LIPITOR) 80 MG tablet Take 1 tablet (80 mg) by mouth daily    blood glucose test strips [BLOOD GLUCOSE TEST STRIPS] Use 1 each As Directed 2 (two) times a day. Dispense brand per patient's insurance at pharmacy discretion.    clopidogrel (PLAVIX) 75 MG tablet Take 1 tablet (75 mg) by mouth daily Dose to start tomorrow.    diltiazem ER COATED BEADS (CARDIZEM CD/CARTIA XT) 180 MG 24 hr capsule Take 1 capsule (180 mg) by mouth daily    folic acid (FOLVITE) 1 MG tablet Take 1 mg by mouth daily Do not take on methotrexate days (Tuesday)    generic lancets [GENERIC LANCETS] Use 1 each As Directed 4 (four) times a day.    ketoconazole (NIZORAL) 2 % external shampoo Apply topically twice a week    losartan (COZAAR) 100 MG tablet TAKE 1 TABLET(100 MG) BY MOUTH DAILY    methotrexate 2.5 MG tablet Take 10 mg by mouth once a week 4 tabs once weekly on Tuesdays    metoprolol succinate ER (TOPROL XL) 50 MG 24 hr tablet Take 50 mg by mouth daily    nitroGLYcerin (NITROSTAT) 0.4 MG sublingual tablet One tablet under the tongue every 5 minutes if needed for chest pain. May repeat every 5 minutes for a maximum of 3 doses in 15 minutes\"    tamsulosin (FLOMAX) 0.4 mg cap [TAMSULOSIN (FLOMAX) 0.4 MG CAP] Take 1 capsule (0.4 mg total) by mouth 2 (two) times a day.            Physical " "Examination     Physical Exam    Estimated body mass index is 32.04 kg/m  as calculated from the following:    Height as of 23: 1.778 m (5' 10\").    Weight as of this encounter: 101.3 kg (223 lb 5.2 oz).    BP (!) 183/74 (BP Location: Right arm)   Pulse 71   Wt 101.3 kg (223 lb 5.2 oz)   SpO2 97%   BMI 32.04 kg/m         General Exam  General: Sitting up in chair in no acute distress  Pulmonary:  no respiratory distress    Neurologic:  Mental Status:  alert, oriented x 3, follows commands, speech clear and fluent, naming and repetition normal  Cranial Nerves:  visual fields intact, PERRL, EOMI with normal smooth pursuit, facial movements symmetric, hearing not formally tested but intact to conversation, no dysarthria, tongue protrusion midline  Motor:  normal muscle tone and bulk, no abnormal movements, able to move all limbs spontaneously, no pronator drift  Reflexes:   deferred  Sensory:  light touch sensation intact and symmetric throughout upper and lower extremities, no extinction on double simultaneous stimulation   Coordination:  normal finger-to-nose and heel-to-shin bilaterally without dysmetria  Station/Gait:  deferred           Screenings and Questionnaires:     Tobacco:    Tobacco Use      Smoking status: Former        Types: Cigarettes        Quit date: 2007        Years since quittin.0        Passive exposure: Never      Smokeless tobacco: Never      Sleep Apnea:       Depression:       No data to display                Stroke Recovery and Risk Factors:       No data to display                      Assessment and Plan       1. TIA    - Picked up Eliquis, but has not started taking it yet. Continued on ASA 81 mg daily. From neuro perspective, Eliquis would be sufficient for secondary TIA/stroke prevention. Defer need for aspirin to PCP/cardiology.   --- Stop Plavix when Eliquis is started.   - Currently on Lipitor 80 mg daily, continue with goal LDL 40-70  - Goal A1c <7.0  - Goal BP " <140/90 with tighter control associated with decreased overall CV risk, if tolerated. Advised him to check daily home BP and check in with PCP in ~1 week.   - SHAWN study ordered while inpatient, he has not scheduled this yet    2. Atrial fibrillation  - Anticoagulation, as above     - Return in about 6 months (around 9/20/2024) for Follow up, with Edilma, (other LEA okay if not available in timeframe).    Stroke Education provided.  He will call us with any questions.  For any acute neurologic deficits he was advised to  go directly to the hospital rather than call the clinic.      Edilma Cervantes, [facility]  Neurology  03/20/2024   ____________________________________________________________________    Billing:    I spent a total of 50 minutes on the day of the visit.   Time spent by me doing chart review, history and exam, documentation and further activities per the note

## 2024-03-19 NOTE — PROGRESS NOTES
PLAN  Continue therapy per current plan of care.    Beginning/End Dates of Progress Note Reporting Period:  2/13/2024 to 03/19/2024    Referring Provider:  Lucio Barnes           03/19/24 0500   Appointment Info   Signing clinician's name / credentials Devon Aceves, PT; Sam Santacruz, SPT   Visits Used 5   Medical Diagnosis TIA (transient ischemic attack)  (TIA, can work on balance, thank you)   PT Tx Diagnosis Impaired functional mobility, balance, gait, and endurance   Quick Adds Student Supervision   Progress Note/Certification   Start of Care Date 02/13/24   Onset of illness/injury or Date of Surgery 02/05/24   Therapy Frequency 1 time per week   Predicted Duration 10 weeks   Certification date from 02/13/24   Certification date to 04/09/24   Progress Note Due Date 04/09/24   Progress Note Completed Date 03/19/24   Supervision   Student Supervision Direct Patient Contact Provided;Therapy services provided with the co-signing licensed therapist guiding and directing the services, and providing the skilled judgement and assessment throughout the session   PT Goal 1   Goal Identifier HEP   Goal Description Koko will demonstrate mastery of ([id number] need for cueing) and compliance with (completion on at least 5/7 days a week) his home exercise program to facilitate increased rate of improvement.   Goal Progress Daily   Target Date 03/12/24   Date Met 03/05/24   PT Goal 2   Goal Identifier LEFS   Goal Description Koko will demonstrate significantly improved function as evidenced by an improved LEFS score of at least 55/80.   Goal Progress 46/80   Target Date 04/09/24   PT Goal 3   Goal Identifier Walking   Goal Description Koko will demonstrate improved tolerance to functional mobility as evidenced by his ability to walk up to 30 minutes with self-report back pain no more than 2/10.   Goal Progress 10 minutes no pain; 20 minutes high pain   Target Date 04/09/24   PT Goal 4   Goal Identifier 30 Second  "Sit-to-stand   Goal Description Koko will demonstrate increased lower extremity strength and improved tolerance to functional transfers as evidenced by an improved 30 second sit-to-stand score of at least 13/30.   Goal Progress 11 reps/standard chair   Target Date 04/09/24   Subjective Report   Subjective Report Patient states maybe he's feeling a little better, nothing too noticeable.  Otherwise notes \"nothing new.\"  He states he does not like being on his back (he \"does not know why\") and has not been doing exercises in supine from his HEP due to this.   Objective Measures   Objective Measures Objective Measure 1;Objective Measure 2;Objective Measure 3;Objective Measure 4   Objective Measure 1   Objective Measure TUG   Details 11.43 seconds (stable on turn)   Objective Measure 2   Objective Measure Romberg   Details Eyes open: 30+ seconds (min sway). Eyes closed: 30 second (moderate sway)   Objective Measure 3   Objective Measure 30 Second Sit-to-stand   Details 11 reps/standard chair   Objective Measure 4   Objective Measure FGA   Details 15/30   Therapeutic Procedure/Exercise   Therapeutic Procedures: strength, endurance, ROM, flexibility minutes (45377) 11   Ther Proc 1 Nustep + subjective report   Ther Proc 1 - Details 8 minutes (level 5)   Ther Proc 2 HEP   Ther Proc 2 - Details Verbal review of HEP   Skilled Intervention Exercises to increase lower extremity strength and endurance   Patient Response/Progress Patient tolerated exercises well; notes mild fatigue after 8 min   Physical Performance Test/measures   Physical Performance Test/Measurement, Minutes (92280) 27   Patient Response/Progress See objective measures for results.  Patient adamantly refused using gait belt despite therapist education on purpose for patient and therapist safety.   Education   Learner/Method Patient;Listening;Demonstration;Pictures/Video;No Barriers to Learning   Plan   Home program See PTRx   Plan for next session Progress " balance intervention, particularly dynamic balance with head turns.  Consider dual cog tasks   Comments   Comments Assessment: Patient re-evaluated on outcome measures today; he scored 2 points lower on the FGA today (15/30 vs. 17/30), however his balance appears to have improved considering a signicant improvement with on his Romberg score (30 seconds w/eyes closed vs. 1 second at initial evaluation).  His TUG score improved by 1.67 seconds, indicating increased safety and indepdence with gait and a decreased fall risk.  He also improved his 30-second sit-to-stand to 11 reps, indicating marginally improved lower extremity strength and increased tolerance to functional transfers. Koko states he's going to get back to riding his motorcycle this spring/summer, so balance interventions requiring dual cognitive tasks and head turns would benefit him in the future.  Patient continues to be appropriate for physical therapy.   Total Session Time   Timed Code Treatment Minutes 38   Total Treatment Time (sum of timed and untimed services) 38

## 2024-03-19 NOTE — TELEPHONE ENCOUNTER
Dr. Argueta,    Pt to start Eliquis due to TIA/recent hospitalization.  Agreement that once on Eliquis and ASA 81 mg, stop Plavix as instructed by PCP?  Any concerns of Eliquis/ASA/Methotrexate?   Thank you BENSON MAX  ___________________________________________________    PC with patient and review. Reports he had a TIA during a recent hospitalization. Recommendation to start Eliquis. Patient has yet to start Eliquis post-hospital course.  Currently on Plavix, Aspirin and Methotrexate. Methotrexate is from rheumatologist, 10 mg once per week.     Starting Eliquis. Looking for input on safety.   PCP advised to stop Plavix, stay on Aspirin with starting Eliquis.     Review with patient comments from pharmacist. Encouraged to discuss with rheumatologist if any concerns related to Methotrexate.    Review of pharmacist input. Discussion with patient regarding blood thinner/Eliquis. Take every 12 hours, monitor for bleeding, call prescribing physician if concerned. Will route to [facility]BF to see if any concerns from cardiology with medication regimen. No further questions at this time. CMM,Rn

## 2024-03-20 ENCOUNTER — OFFICE VISIT (OUTPATIENT)
Dept: NEUROLOGY | Facility: CLINIC | Age: 79
End: 2024-03-20
Attending: PHYSICIAN ASSISTANT
Payer: COMMERCIAL

## 2024-03-20 VITALS
BODY MASS INDEX: 32.04 KG/M2 | HEART RATE: 71 BPM | WEIGHT: 223.33 LBS | OXYGEN SATURATION: 97 % | SYSTOLIC BLOOD PRESSURE: 183 MMHG | DIASTOLIC BLOOD PRESSURE: 88 MMHG

## 2024-03-20 DIAGNOSIS — G45.9 TIA (TRANSIENT ISCHEMIC ATTACK): ICD-10-CM

## 2024-03-20 PROCEDURE — 99215 OFFICE O/P EST HI 40 MIN: CPT | Performed by: NURSE PRACTITIONER

## 2024-03-20 NOTE — LETTER
3/20/2024         RE: Koko Ronquillo  [address]  [place] [address]        Dear Colleague,    Thank you for referring your patient, Koko Ronquillo, to the [facility] NEUROLOGY [facility]. Please see a copy of my visit note below.    __________________________________________________________      [facility] Neurology [facility]   [phone]  __________________________________________________________         History of Present Illness   Chief Complaint: Patient presents with:  Tia (Transient Ischemic Attack): TIA      Koko Ronquillo is a 78 year old male presenting for follow-up for TIA.     He was hospitalized at [facility] on 2/5/24. Prior to the hospital stay, he had a past medical history of atrial fibrillation not on anticoagulation (not on AC per last cardiology note 2/2023 due to no known recent afib, some report of intermittent palpitations), HTN, HLD, CAD, SHAWN, DM2, RA, complex endovascular aortic aneurysm repair.    He presented to the hospital with difficulty speaking and reading words on his computer. Symptoms last about 1 hour before resolving. He was recommended to start Eliquis for stroke prevention in the setting of atrial fibrillation, but wanted to consider it further. In the meantime, he was discharged on DAPT with ASA + Plavix.     Since discharge, he reports that he has felt at baseline. No additional episodes of language impairment. Discussed methotrexate and anticoagulation with rheumatology and PCP and elected to proceed with Eliquis. He has not started the Eliquis yet, as he had lingering questions about taking it with his methotrexate. Per pharmacy note 3/18: No increased risk of bleeding with apixiban and methotrexate. No concerns with aspirin and methotrexate, provided aspirin is the preventative dosage and methotrexate is at lower dosages used for rheumatological disorders, not chemotherapeutic dosages.     Eliquis prescription  was $300. He plans to call insurance to find out if another DOAC has better coverage. Hasn't checked BP in a while, but it was previously 150/80s. BP in clinic is high, he reports that he was anxious about driving and getting to [place].     Modified [place] Scale  Score: 0-No symptoms    Stroke Evaluation Summarized:  New results resulted and reviewed by me today are in BOLD below.  I personally reviewed the following neuroimaging studies today and the comments above reflect my own personal interpretation of the images:   images: MRI brain  MRI and/or Head CT MRI: 1.  No discrete mass lesion, hemorrhage or focal area suggestive of acute ischemia.  2.  No abnormal enhancement.  3.  Diffuse age related changes  CTP: Normal cerebral perfusion.    CT head: 1.  No intracranial hemorrhage, mass lesions, hydrocephalus or CT evidence for an acute infarct. MRI is more sensitive for the evaluation of acute infarcts.  2.  Mild diffuse cerebral parenchymal volume loss. Presumed chronic hypertensive/microvascular ischemic white matter changes.   Intracranial Vasculature MRA brain: 1.  No discrete vessel occlusion, significant stenosis, aneurysm or high flow vascular malformation involving the arteries of the [place].     CTA head:  1.  No hemodynamically significant narrowing of the proximal major intracranial arteries. 2 mm infundibulum at the right middle cerebral artery trifurcation.  2.  Mild atherosclerotic plaque of the cavernous and supraclinoid internal carotid arteries. Mild to moderate atherosclerotic plaque of the intracranial left vertebral artery.    Cervical Vasculature MRA neck: 1.  Normal configuration of the great vessels off the aortic arch with no significant stenosis of their origins.  2.  No significant stenosis or irregularity involving the arteries of the neck by NASCET criteria.  3.  No radiographic evidence of dissection.     CTA neck:  1.  Mild plaque at both proximal internal carotid arteries.  No hemodynamically significant narrowing of the internal carotid arteries bilaterally based on the NASCET criteria.  2.  Mild narrowing of the origin of the left vertebral artery. Multifocal mild narrowings of the proximal V2 segment of the left vertebral artery. Mild narrowing of the distal V2 and V3 segment of the right vertebral artery.      Echocardiogram TTE: Pending   EKG/Telemetry Sinus bradycardia    Other Testing Not Applicable      Labs Lab Results   Component Value Date    LDL 55 02/05/2024    A1C 6.2 (H) 02/05/2024    CTROPT 15 02/05/2024    INR 1.08 02/05/2024    INR 0.95 01/03/2022               Home Medications     Outpatient Medications Marked as Taking for the 3/20/24 encounter (Office Visit) with Edilma Cervantes CNP   Medication Sig     albuterol (PROAIR HFA;PROVENTIL HFA;VENTOLIN HFA) 90 mcg/actuation inhaler Inhale 2 puffs into the lungs every 4 hours as needed     atorvastatin (LIPITOR) 80 MG tablet Take 1 tablet (80 mg) by mouth daily     blood glucose test strips [BLOOD GLUCOSE TEST STRIPS] Use 1 each As Directed 2 (two) times a day. Dispense brand per patient's insurance at pharmacy discretion.     clopidogrel (PLAVIX) 75 MG tablet Take 1 tablet (75 mg) by mouth daily Dose to start tomorrow.     diltiazem ER COATED BEADS (CARDIZEM CD/CARTIA XT) 180 MG 24 hr capsule Take 1 capsule (180 mg) by mouth daily     folic acid (FOLVITE) 1 MG tablet Take 1 mg by mouth daily Do not take on methotrexate days (Tuesday)     generic lancets [GENERIC LANCETS] Use 1 each As Directed 4 (four) times a day.     ketoconazole (NIZORAL) 2 % external shampoo Apply topically twice a week     losartan (COZAAR) 100 MG tablet TAKE 1 TABLET(100 MG) BY MOUTH DAILY     methotrexate 2.5 MG tablet Take 10 mg by mouth once a week 4 tabs once weekly on Tuesdays     metoprolol succinate ER (TOPROL XL) 50 MG 24 hr tablet Take 50 mg by mouth daily     nitroGLYcerin (NITROSTAT) 0.4 MG sublingual tablet One tablet under the  "tongue every 5 minutes if needed for chest pain. May repeat every 5 minutes for a maximum of 3 doses in 15 minutes\"     tamsulosin (FLOMAX) 0.4 mg cap [TAMSULOSIN (FLOMAX) 0.4 MG CAP] Take 1 capsule (0.4 mg total) by mouth 2 (two) times a day.            Physical Examination     Physical Exam    Estimated body mass index is 32.04 kg/m  as calculated from the following:    Height as of 23: 1.778 m (5' 10\").    Weight as of this encounter: 101.3 kg (223 lb 5.2 oz).    BP (!) 183/74 (BP Location: Right arm)   Pulse 71   Wt 101.3 kg (223 lb 5.2 oz)   SpO2 97%   BMI 32.04 kg/m         General Exam  General: Sitting up in chair in no acute distress  Pulmonary:  no respiratory distress    Neurologic:  Mental Status:  alert, oriented x 3, follows commands, speech clear and fluent, naming and repetition normal  Cranial Nerves:  visual fields intact, PERRL, EOMI with normal smooth pursuit, facial movements symmetric, hearing not formally tested but intact to conversation, no dysarthria, tongue protrusion midline  Motor:  normal muscle tone and bulk, no abnormal movements, able to move all limbs spontaneously, no pronator drift  Reflexes:   deferred  Sensory:  light touch sensation intact and symmetric throughout upper and lower extremities, no extinction on double simultaneous stimulation   Coordination:  normal finger-to-nose and heel-to-shin bilaterally without dysmetria  Station/Gait:  deferred           Screenings and Questionnaires:     Tobacco:    Tobacco Use      Smoking status: Former        Types: Cigarettes        Quit date: 2007        Years since quittin.0        Passive exposure: Never      Smokeless tobacco: Never      Sleep Apnea:       Depression:       No data to display                Stroke Recovery and Risk Factors:       No data to display                      Assessment and Plan       1. TIA    - Picked up Eliquis, but has not started taking it yet. Continued on ASA 81 mg daily. From " neuro perspective, Eliquis would be sufficient for secondary TIA/stroke prevention. Defer need for aspirin to PCP/cardiology.   --- Stop Plavix when Eliquis is started.   - Currently on Lipitor 80 mg daily, continue with goal LDL 40-70  - Goal A1c <7.0  - Goal BP <140/90 with tighter control associated with decreased overall CV risk, if tolerated. Advised him to check daily home BP and check in with PCP in ~1 week.   - SHAWN study ordered while inpatient, he has not scheduled this yet    2. Atrial fibrillation  - Anticoagulation, as above     - Return in about 6 months (around 9/20/2024) for Follow up, with Edilma, (other LEA okay if not available in timeframe).    Stroke Education provided.  He will call us with any questions.  For any acute neurologic deficits he was advised to  go directly to the hospital rather than call the clinic.      Edilma Crevantes CNP  Neurology  03/20/2024   ____________________________________________________________________    Billing:    I spent a total of 50 minutes on the day of the visit.   Time spent by me doing chart review, history and exam, documentation and further activities per the note          Again, thank you for allowing me to participate in the care of your patient.        Sincerely,        Edilma Cervantes CNP

## 2024-03-20 NOTE — NURSING NOTE
Symptoms or concerns today: Aspirin?    Med comments:     Who the patient is here with today: Self    Christy Calderon MA

## 2024-03-20 NOTE — TELEPHONE ENCOUNTER
===View-only below this line===  ----- Message -----  From: Ajay Argueta MD  Sent: 3/20/2024   3:46 PM CDT  To: Diego Cade RN    I have not seen him in over a year.  Given that, if he is doing well, plan was for Plavix only for a year which would be stopped January 2024.  I would agree with stopping that and continue just Eliquis and aspirin but he should come in and see me.  LF

## 2024-03-21 NOTE — TELEPHONE ENCOUNTER
PC and discussion/review of recommendation. Verbalized understanding. Review of upcoming appt with LBF; date, time,and location. No further questions. Plavix removed for medication list. Eliquis added as a new medication. WINTER,Rn

## 2024-03-26 ENCOUNTER — THERAPY VISIT (OUTPATIENT)
Dept: PHYSICAL THERAPY | Facility: REHABILITATION | Age: 79
End: 2024-03-26
Payer: COMMERCIAL

## 2024-03-26 DIAGNOSIS — G45.9 TIA (TRANSIENT ISCHEMIC ATTACK): Primary | ICD-10-CM

## 2024-03-26 PROCEDURE — 97110 THERAPEUTIC EXERCISES: CPT | Mod: GP | Performed by: PHYSICAL THERAPIST

## 2024-03-26 PROCEDURE — 97112 NEUROMUSCULAR REEDUCATION: CPT | Mod: GP | Performed by: PHYSICAL THERAPIST

## 2024-04-02 ENCOUNTER — THERAPY VISIT (OUTPATIENT)
Dept: PHYSICAL THERAPY | Facility: REHABILITATION | Age: 79
End: 2024-04-02
Payer: COMMERCIAL

## 2024-04-02 ENCOUNTER — TELEPHONE (OUTPATIENT)
Dept: CARDIOLOGY | Facility: CLINIC | Age: 79
End: 2024-04-02

## 2024-04-02 DIAGNOSIS — I10 ESSENTIAL HYPERTENSION WITH GOAL BLOOD PRESSURE LESS THAN 140/90: ICD-10-CM

## 2024-04-02 DIAGNOSIS — I10 ESSENTIAL HYPERTENSION: ICD-10-CM

## 2024-04-02 DIAGNOSIS — G45.9 TIA (TRANSIENT ISCHEMIC ATTACK): Primary | ICD-10-CM

## 2024-04-02 DIAGNOSIS — I48.91 NEW ONSET ATRIAL FIBRILLATION (H): ICD-10-CM

## 2024-04-02 PROCEDURE — 97112 NEUROMUSCULAR REEDUCATION: CPT | Mod: GP

## 2024-04-02 PROCEDURE — 97110 THERAPEUTIC EXERCISES: CPT | Mod: GP

## 2024-04-02 RX ORDER — DILTIAZEM HYDROCHLORIDE 180 MG/1
180 CAPSULE, COATED, EXTENDED RELEASE ORAL DAILY
Qty: 30 CAPSULE | Refills: 0 | Status: SHIPPED | OUTPATIENT
Start: 2024-04-02 | End: 2024-04-19

## 2024-04-02 NOTE — TELEPHONE ENCOUNTER
Incoming fax request to refill Diltiazem 180 mg capsule by mouth once daily. Upcoming OV with LBF. Overdue for annual OV. 30 day supply sent. WINTER,RN

## 2024-04-08 ENCOUNTER — OFFICE VISIT (OUTPATIENT)
Dept: CARDIOLOGY | Facility: CLINIC | Age: 79
End: 2024-04-08
Payer: COMMERCIAL

## 2024-04-08 VITALS
WEIGHT: 238.3 LBS | OXYGEN SATURATION: 95 % | BODY MASS INDEX: 34.19 KG/M2 | RESPIRATION RATE: 16 BRPM | HEART RATE: 68 BPM | DIASTOLIC BLOOD PRESSURE: 68 MMHG | SYSTOLIC BLOOD PRESSURE: 154 MMHG

## 2024-04-08 DIAGNOSIS — G47.33 OSA (OBSTRUCTIVE SLEEP APNEA): ICD-10-CM

## 2024-04-08 DIAGNOSIS — I25.83 CORONARY ARTERY DISEASE DUE TO LIPID RICH PLAQUE: Primary | ICD-10-CM

## 2024-04-08 DIAGNOSIS — M05.731 RHEUMATOID ARTHRITIS INVOLVING BOTH WRISTS WITH POSITIVE RHEUMATOID FACTOR (H): ICD-10-CM

## 2024-04-08 DIAGNOSIS — E66.09 CLASS 1 OBESITY DUE TO EXCESS CALORIES WITHOUT SERIOUS COMORBIDITY WITH BODY MASS INDEX (BMI) OF 34.0 TO 34.9 IN ADULT: ICD-10-CM

## 2024-04-08 DIAGNOSIS — I71.43 INFRARENAL ABDOMINAL AORTIC ANEURYSM (AAA) WITHOUT RUPTURE (H): ICD-10-CM

## 2024-04-08 DIAGNOSIS — I48.0 PAROXYSMAL ATRIAL FIBRILLATION (H): ICD-10-CM

## 2024-04-08 DIAGNOSIS — E78.00 PURE HYPERCHOLESTEROLEMIA: ICD-10-CM

## 2024-04-08 DIAGNOSIS — I10 ESSENTIAL HYPERTENSION: ICD-10-CM

## 2024-04-08 DIAGNOSIS — G45.9 TIA (TRANSIENT ISCHEMIC ATTACK): ICD-10-CM

## 2024-04-08 DIAGNOSIS — E66.811 CLASS 1 OBESITY DUE TO EXCESS CALORIES WITHOUT SERIOUS COMORBIDITY WITH BODY MASS INDEX (BMI) OF 34.0 TO 34.9 IN ADULT: ICD-10-CM

## 2024-04-08 DIAGNOSIS — I25.10 CORONARY ARTERY DISEASE DUE TO LIPID RICH PLAQUE: Primary | ICD-10-CM

## 2024-04-08 DIAGNOSIS — M05.732 RHEUMATOID ARTHRITIS INVOLVING BOTH WRISTS WITH POSITIVE RHEUMATOID FACTOR (H): ICD-10-CM

## 2024-04-08 PROCEDURE — 99214 OFFICE O/P EST MOD 30 MIN: CPT | Performed by: INTERNAL MEDICINE

## 2024-04-08 PROCEDURE — G2211 COMPLEX E/M VISIT ADD ON: HCPCS | Performed by: INTERNAL MEDICINE

## 2024-04-08 NOTE — PROGRESS NOTES
[facility]  Heart Care Clinic Follow-up Note    Assessment & Plan        (I25.10,  I25.83) Coronary artery disease due to lipid rich plaque  (primary encounter diagnosis)  Comment: Due to borderline abnormal troponin in 2019 0.35 and another abnormal troponin December 31, 2022 of 0.33 he underwent angiography. January 3, 2022 angiography showed left main 10% stenosis, proximal LAD 25% followed by mid 25% followed by 90% mid lesion that received a drug-coated stent as well as a second diagonal 90% stenosis.  The circumflex had a proximal 20% lesion, mid 40% lesion, with a third obtuse marginal artery 50% stenosis and the fourth obtuse marginal artery 50% stenosis.  Right coronary artery had a proximal 10% lesion, there is a patent stent, distal right coronary artery 80% stenosis which got a drug-coated stent as well as a distal 50% lesion.  He had some atypical chest discomfort around December 2022 , presented to the hospital, had unremarkable stress nuclear, but still has his shortness of breath.  Given that, we will go ahead and repeat stress nuclear.     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  [facility] had repeat intervention, doing better.     (I10) Hypertension  Comment: Slightly elevated, on Cardizem 180.  Losartan 100, as well as metoprolol 50.  He tells me his blood pressure is doing much better at home.  He will check some at home and call them into me.     (E78.00) Pure hypercholesterolemia  Comment: Total cholesterol 116 with an LDL of 55 on atorvastatin 80 which is excellent.     (G47.33) SHAWN (obstructive sleep apnea)  Comment: So noted, work on weight loss.  He does not have CPAP at home, does have repeat sleep study coming up, agree with pursuing this.     (E66.01) Class 3 severe obesity due to excess calories without serious comorbidity in adult,  unspecified BMI (H)  Comment: Work on weight loss.     (E11.9) Type 2 diabetes mellitus treated without insulin (H)  Comment: Hemoglobin A1c 7.1 and defer to primary.     (I48.0) Paroxysmal atrial fibrillation (H)  Comment: Paroxysmal, possibly due to sleep apnea, no recent episodes since before 2016.  Currently given advanced CHADS 2 VASC score he is on Eliquis 5 mg by mouth twice daily.  He tells me it is expensive.  I discussed the possibility of proceeding with left atrial appendage occlusion and would rather pay for the Eliquis at this point in time.     (M54.50,  G89.29) Chronic bilateral low back pain without sciatica  Comment: Biggest issue, defer to primary.  I told him to try some Tylenol arthritis.     (G45.9) TIA (transient ischemic attack)  Comment: Recently aphasic, seen by neurology, presumed due to atrial fibrillation and thus placed on Eliquis much to his desire not to be on blood thinner.    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    Plan  1.  Work on weight loss.  2.  Stress nuclear pharmacological given his inability to walk given his back pain.  If significantly abnormal angiography.  3.  Speak with primary about back discomfort.  Try some Tylenol.  4.  Restart aspirin 3 times a week, Monday Wednesday Friday.  Baby aspirin.  5.  Follow-up me in 6 months given all these issues.  6.  Check some blood pressures at home and call them into me and we will adjust meds if needed.  7.  He needs prescription refilled, does not know what it is, will call into me.    Subjective  CC: 78-year-old white gentleman here for follow-up visit, still living independently in a 3 bedroom house.  Still likes to work around in the garage and car engines.  Tells me he basically spends most of his time watching TV.  Complains of intense back discomfort.  Has his baseline shortness of breath and minimal activity.  Tells  "me he sometimes feels his heart beating forcefully.  No recurrent aphasia, chest pains, palpitations, PND, orthopnea or peripheral edema.    Medications  Current Outpatient Medications   Medication Sig Dispense Refill    albuterol (PROAIR HFA;PROVENTIL HFA;VENTOLIN HFA) 90 mcg/actuation inhaler Inhale 2 puffs into the lungs every 4 hours as needed      apixaban ANTICOAGULANT (ELIQUIS) 5 MG tablet Take 5 mg by mouth 2 times daily      atorvastatin (LIPITOR) 80 MG tablet Take 1 tablet (80 mg) by mouth daily 90 tablet 3    diltiazem ER COATED BEADS (CARDIZEM CD/CARTIA XT) 180 MG 24 hr capsule Take 1 capsule (180 mg) by mouth daily 30 capsule 0    folic acid (FOLVITE) 1 MG tablet Take 1 mg by mouth daily Do not take on methotrexate days (Tuesday)  4    ketoconazole (NIZORAL) 2 % external shampoo Apply topically twice a week      losartan (COZAAR) 100 MG tablet TAKE 1 TABLET(100 MG) BY MOUTH DAILY 90 tablet 1    methotrexate 2.5 MG tablet Take 10 mg by mouth once a week 4 tabs once weekly on Tuesdays      metoprolol succinate ER (TOPROL XL) 50 MG 24 hr tablet Take 50 mg by mouth daily      nitroGLYcerin (NITROSTAT) 0.4 MG sublingual tablet One tablet under the tongue every 5 minutes if needed for chest pain. May repeat every 5 minutes for a maximum of 3 doses in 15 minutes\" 25 tablet 3    tamsulosin (FLOMAX) 0.4 mg cap [TAMSULOSIN (FLOMAX) 0.4 MG CAP] Take 1 capsule (0.4 mg total) by mouth 2 (two) times a day. 180 capsule 0    blood glucose test strips [BLOOD GLUCOSE TEST STRIPS] Use 1 each As Directed 2 (two) times a day. Dispense brand per patient's insurance at pharmacy discretion. (Patient not taking: Reported on 4/8/2024) 180 strip 1    generic lancets [GENERIC LANCETS] Use 1 each As Directed 4 (four) times a day. (Patient not taking: Reported on 4/8/2024) 100 each 6       Objective  BP (!) 154/68 (BP Location: Left arm, Patient Position: Sitting, Cuff Size: Adult Regular)   Pulse 68   Resp 16   Wt 108.1 kg (238 " "lb 4.8 oz)   SpO2 95%   BMI 34.19 kg/m      General Appearance:    Alert, cooperative, no distress, appears stated age   Head:    Normocephalic, without obvious abnormality, atraumatic   Throat:   Lips, mucosa, and tongue normal; teeth and gums normal   Neck:   Supple, symmetrical, trachea midline, no adenopathy;        thyroid:  No enlargement/tenderness/nodules; no carotid    bruit or JVD   Back:     Symmetric, no curvature, ROM normal, no CVA tenderness   Lungs:     Clear to auscultation bilaterally, respirations unlabored   Chest wall:    No tenderness or deformity   Heart:    Regular rate and rhythm, S1 and S2 normal, no murmur, rub   or gallop   Abdomen:     Soft, non-tender, bowel sounds active all four quadrants,     no masses, no organomegaly   Extremities:   Normal, atraumatic, no cyanosis or edema   Pulses:   2+ and symmetric all extremities   Skin:   Skin color, texture, turgor normal, no rashes or lesions     Results    Lab Results personally reviewed   Lab Results   Component Value Date    CHOL 116 02/05/2024    CHOL 120 02/21/2022     Lab Results   Component Value Date    HDL 30 (L) 02/05/2024    HDL 27 (L) 02/21/2022     No components found for: \"LDLCALC\"  Lab Results   Component Value Date    TRIG 156 (H) 02/05/2024    TRIG 331 (H) 02/21/2022     Lab Results   Component Value Date    WBC 6.9 02/06/2024    HGB 12.9 (L) 02/06/2024    HCT 38.0 (L) 02/06/2024     02/06/2024     Lab Results   Component Value Date    BUN 11.6 02/06/2024     02/06/2024    CO2 23 02/06/2024           "

## 2024-04-08 NOTE — LETTER
4/8/2024    Jelani Shabazz MD  [facility] [address]  [facility] [address]    RE: Koko Ronquillo       Dear Colleague,     I had the pleasure of seeing Koko Ronquillo in the [facility].      [facility]  [facility] Clinic Follow-up Note    Assessment & Plan        (I25.10,  I25.83) Coronary artery disease due to lipid rich plaque  (primary encounter diagnosis)  Comment: Due to borderline abnormal troponin in 2019 0.35 and another abnormal troponin December 31, 2022 of 0.33 he underwent angiography. January 3, 2022 angiography showed left main 10% stenosis, proximal LAD 25% followed by mid 25% followed by 90% mid lesion that received a drug-coated stent as well as a second diagonal 90% stenosis.  The circumflex had a proximal 20% lesion, mid 40% lesion, with a third obtuse marginal artery 50% stenosis and the fourth obtuse marginal artery 50% stenosis.  Right coronary artery had a proximal 10% lesion, there is a patent stent, distal right coronary artery 80% stenosis which got a drug-coated stent as well as a distal 50% lesion.  He had some atypical chest discomfort around December 2022 , presented to the hospital, had unremarkable stress nuclear, but still has his shortness of breath.  Given that, we will go ahead and repeat stress nuclear.     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  [facility] had repeat intervention, doing better.     (I10) Hypertension  Comment: Slightly elevated, on Cardizem 180.  Losartan 100, as well as metoprolol 50.  He tells me his blood pressure is doing much better at home.  He will check some at home and call them into me.     (E78.00) Pure hypercholesterolemia  Comment: Total cholesterol 116 with an LDL of 55 on atorvastatin 80 which is excellent.     (G47.33) SHAWN (obstructive sleep  apnea)  Comment: So noted, work on weight loss.  He does not have CPAP at home, does have repeat sleep study coming up, agree with pursuing this.     (E66.01) Class 3 severe obesity due to excess calories without serious comorbidity in adult, unspecified BMI (H)  Comment: Work on weight loss.     (E11.9) Type 2 diabetes mellitus treated without insulin (H)  Comment: Hemoglobin A1c 7.1 and defer to primary.     (I48.0) Paroxysmal atrial fibrillation (H)  Comment: Paroxysmal, possibly due to sleep apnea, no recent episodes since before 2016.  Currently given advanced CHADS 2 VASC score he is on Eliquis 5 mg by mouth twice daily.  He tells me it is expensive.  I discussed the possibility of proceeding with left atrial appendage occlusion and would rather pay for the Eliquis at this point in time.     (M54.50,  G89.29) Chronic bilateral low back pain without sciatica  Comment: Biggest issue, defer to primary.  I told him to try some Tylenol arthritis.     (G45.9) TIA (transient ischemic attack)  Comment: Recently aphasic, seen by neurology, presumed due to atrial fibrillation and thus placed on Eliquis much to his desire not to be on blood thinner.    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    Plan  1.  Work on weight loss.  2.  Stress nuclear pharmacological given his inability to walk given his back pain.  If significantly abnormal angiography.  3.  Speak with primary about back discomfort.  Try some Tylenol.  4.  Restart aspirin 3 times a week, Monday Wednesday Friday.  Baby aspirin.  5.  Follow-up me in 6 months given all these issues.  6.  Check some blood pressures at home and call them into me and we will adjust meds if needed.  7.  He needs prescription refilled, does not know what it is, will call into me.    Subjective  CC: 78-year-old white gentleman here for follow-up visit, still living independently in a  "3 bedroom house.  Still likes to work around in the garage and car engines.  Tells me he basically spends most of his time watching TV.  Complains of intense back discomfort.  Has his baseline shortness of breath and minimal activity.  Tells me he sometimes feels his heart beating forcefully.  No recurrent aphasia, chest pains, palpitations, PND, orthopnea or peripheral edema.    Medications  Current Outpatient Medications   Medication Sig Dispense Refill    albuterol (PROAIR HFA;PROVENTIL HFA;VENTOLIN HFA) 90 mcg/actuation inhaler Inhale 2 puffs into the lungs every 4 hours as needed      apixaban ANTICOAGULANT (ELIQUIS) 5 MG tablet Take 5 mg by mouth 2 times daily      atorvastatin (LIPITOR) 80 MG tablet Take 1 tablet (80 mg) by mouth daily 90 tablet 3    diltiazem ER COATED BEADS (CARDIZEM CD/CARTIA XT) 180 MG 24 hr capsule Take 1 capsule (180 mg) by mouth daily 30 capsule 0    folic acid (FOLVITE) 1 MG tablet Take 1 mg by mouth daily Do not take on methotrexate days (Tuesday)  4    ketoconazole (NIZORAL) 2 % external shampoo Apply topically twice a week      losartan (COZAAR) 100 MG tablet TAKE 1 TABLET(100 MG) BY MOUTH DAILY 90 tablet 1    methotrexate 2.5 MG tablet Take 10 mg by mouth once a week 4 tabs once weekly on Tuesdays      metoprolol succinate ER (TOPROL XL) 50 MG 24 hr tablet Take 50 mg by mouth daily      nitroGLYcerin (NITROSTAT) 0.4 MG sublingual tablet One tablet under the tongue every 5 minutes if needed for chest pain. May repeat every 5 minutes for a maximum of 3 doses in 15 minutes\" 25 tablet 3    tamsulosin (FLOMAX) 0.4 mg cap [TAMSULOSIN (FLOMAX) 0.4 MG CAP] Take 1 capsule (0.4 mg total) by mouth 2 (two) times a day. 180 capsule 0    blood glucose test strips [BLOOD GLUCOSE TEST STRIPS] Use 1 each As Directed 2 (two) times a day. Dispense brand per patient's insurance at pharmacy discretion. (Patient not taking: Reported on 4/8/2024) 180 strip 1    generic lancets [GENERIC LANCETS] Use 1 " "each As Directed 4 (four) times a day. (Patient not taking: Reported on 4/8/2024) 100 each 6       Objective  BP (!) 154/68 (BP Location: Left arm, Patient Position: Sitting, Cuff Size: Adult Regular)   Pulse 68   Resp 16   Wt 108.1 kg (238 lb 4.8 oz)   SpO2 95%   BMI 34.19 kg/m      General Appearance:    Alert, cooperative, no distress, appears stated age   Head:    Normocephalic, without obvious abnormality, atraumatic   Throat:   Lips, mucosa, and tongue normal; teeth and gums normal   Neck:   Supple, symmetrical, trachea midline, no adenopathy;        thyroid:  No enlargement/tenderness/nodules; no carotid    bruit or JVD   Back:     Symmetric, no curvature, ROM normal, no CVA tenderness   Lungs:     Clear to auscultation bilaterally, respirations unlabored   Chest wall:    No tenderness or deformity   Heart:    Regular rate and rhythm, S1 and S2 normal, no murmur, rub   or gallop   Abdomen:     Soft, non-tender, bowel sounds active all four quadrants,     no masses, no organomegaly   Extremities:   Normal, atraumatic, no cyanosis or edema   Pulses:   2+ and symmetric all extremities   Skin:   Skin color, texture, turgor normal, no rashes or lesions     Results    Lab Results personally reviewed   Lab Results   Component Value Date    CHOL 116 02/05/2024    CHOL 120 02/21/2022     Lab Results   Component Value Date    HDL 30 (L) 02/05/2024    HDL 27 (L) 02/21/2022     No components found for: \"LDLCALC\"  Lab Results   Component Value Date    TRIG 156 (H) 02/05/2024    TRIG 331 (H) 02/21/2022     Lab Results   Component Value Date    WBC 6.9 02/06/2024    HGB 12.9 (L) 02/06/2024    HCT 38.0 (L) 02/06/2024     02/06/2024     Lab Results   Component Value Date    BUN 11.6 02/06/2024     02/06/2024    CO2 23 02/06/2024               Thank you for allowing me to participate in the care of your patient.      Sincerely,     PARVIZ CARRERO MD     [facility]  cc:   Jelani Shabazz MD  Replaced by [facility]  [address] [place] [address]

## 2024-04-08 NOTE — PATIENT INSTRUCTIONS
Mr Koko DUTTA Yg,  I enjoyed visiting with you again today.  I am glad to hear you are doing well.  Per our conversation restart a baby aspirin 3 times a week.  Let us get the stress test.  Call me with med you need refilled [phone].  Check some blood pressures and call to me at above number.  I will plan on seeing you 6 months.  Ajay Argueta

## 2024-04-09 ENCOUNTER — THERAPY VISIT (OUTPATIENT)
Dept: PHYSICAL THERAPY | Facility: REHABILITATION | Age: 79
End: 2024-04-09
Payer: COMMERCIAL

## 2024-04-09 DIAGNOSIS — G45.9 TIA (TRANSIENT ISCHEMIC ATTACK): Primary | ICD-10-CM

## 2024-04-09 PROCEDURE — 97110 THERAPEUTIC EXERCISES: CPT | Mod: GP

## 2024-04-09 PROCEDURE — 97112 NEUROMUSCULAR REEDUCATION: CPT | Mod: GP

## 2024-04-16 ENCOUNTER — THERAPY VISIT (OUTPATIENT)
Dept: PHYSICAL THERAPY | Facility: REHABILITATION | Age: 79
End: 2024-04-16
Payer: COMMERCIAL

## 2024-04-16 DIAGNOSIS — G45.9 TIA (TRANSIENT ISCHEMIC ATTACK): Primary | ICD-10-CM

## 2024-04-16 PROCEDURE — 97110 THERAPEUTIC EXERCISES: CPT | Mod: GP

## 2024-04-16 PROCEDURE — 97750 PHYSICAL PERFORMANCE TEST: CPT | Mod: GP

## 2024-04-16 NOTE — PROGRESS NOTES
[facility]                                                                                   OUTPATIENT PHYSICAL THERAPY    PLAN OF TREATMENT FOR OUTPATIENT REHABILITATION   Patient's Last Name, First Name, Koko Bustamante YOB: 1945   Provider's Name   [facility]   Medical Record No.  [id number]     Onset Date: 02/05/24  Start of Care Date: 02/13/24     Medical Diagnosis:  TIA (transient ischemic attack) (TIA, can work on balance, thank you)      PT Treatment Diagnosis:  Impaired functional mobility, balance, gait, and endurance Plan of Treatment  Frequency/Duration: 1 time per week/ 10 weeks    Certification date from 04/09/24 to 07/15/24         See note for plan of treatment details and functional goals     Swati Robertson PT                         I CERTIFY THE NEED FOR THESE SERVICES FURNISHED UNDER        THIS PLAN OF TREATMENT AND WHILE UNDER MY CARE .             Physician Signature               Date    X_____________________________________________________                  Referring Provider:  Lucio Barnes    Initial Assessment  See Epic Evaluation- Start of Care Date: 02/13/24            PLAN  Continue therapy per current plan of care.    Beginning/End Dates of Progress Note Reporting Period:  03/19/24 to 04/16/2024    Referring Provider:  Lucio Barnes        04/16/24 0500   Appointment Info   Signing clinician's name / credentials Swati Robertson PT   Visits Used 9   Medical Diagnosis TIA (transient ischemic attack)  (TIA, can work on balance, thank you)   PT Tx Diagnosis Impaired functional mobility, balance, gait, and endurance   Progress Note/Certification   Start of Care Date 02/13/24   Onset of illness/injury or Date of Surgery 02/05/24   Therapy Frequency 1 time per week   Predicted Duration 10 weeks   Certification date from 04/09/24   Certification date to 07/15/24   Progress Note Due Date 04/09/24    Progress Note Completed Date 03/19/24   PT Goal 1   Goal Identifier HEP   Goal Description Koko will demonstrate mastery of ([id number] need for cueing) and compliance with (completion on at least 5/7 days a week) his home exercise program to facilitate increased rate of improvement.   Goal Progress Daily   Target Date 03/12/24   Date Met 03/05/24   PT Goal 2   Goal Identifier LEFS   Goal Description Koko will demonstrate significantly improved function as evidenced by an improved LEFS score of at least 55/80.   Goal Progress 46/80 on 3/19/2024, progressing towards   Target Date 07/15/24   PT Goal 3   Goal Identifier Walking   Goal Description Koko will demonstrate improved tolerance to functional mobility as evidenced by his ability to walk up to 30 minutes with self-report back pain no more than 2/10.   Goal Progress 10 minutes no pain; 20 minutes high pain, progressing towards   Target Date 04/09/24   PT Goal 4   Goal Identifier 30 Second Sit-to-stand   Goal Description Koko will demonstrate increased lower extremity strength and improved tolerance to functional transfers as evidenced by an improved 30 second sit-to-stand score of at least 13/30.   Goal Progress 11 reps/standard chair on 3/19/2024, 10 reps/standard chair on 4/16/24   Target Date 04/09/24   Subjective Report   Subjective Report Pt reports his  balance continues to challenge  him. He has been able to  move around his house  more and spent time out in his garage this weekend.   Objective Measures   Objective Measures Objective Measure 1;Objective Measure 2;Objective Measure 3;Objective Measure 4   Objective Measure 1   Objective Measure TUG   Details 3/19/2024: 11.43 seconds (stable on turn), 9.98 seconds (stable turn) on 4/16/24   Objective Measure 2   Objective Measure Romberg   Details 3/19/2024: Eyes open: 30+ seconds (min sway). Eyes closed: 30 second (moderate sway); 4/16/2024: Eyes open: 30+ seconds (no sway-minimal sway).  "Eyes closed: 34 second (mod sway)   Objective Measure 3   Objective Measure 30 Second Sit-to-stand   Details 3/19/2024: 11 reps/standard chair; 10 reps/standard chair on 4/16/24   Objective Measure 4   Objective Measure FGA   Details 3/19/2024: 15/30; 4/16/24: 24/30   Therapeutic Procedure/Exercise   Therapeutic Procedures: strength, endurance, ROM, flexibility minutes (03579) 8   Ther Proc 1 Nustep   Ther Proc 1 - Details 5 minutes (level 5)   Ther Proc 2 HEP   Ther Proc 2 - Details Verbal review of HEP   Ther Proc 3 Reviewed Step ups: 4\" 1 set  x  10 reps B, cues for which leg to step up with   Ther Proc 3 - Details STS: x 4  reps, added to HEP and challenged pt  to  perform on various surfaces in his home   Ther Proc 4 Edu pt on importance of moving throughout the day, instructed him to get up and move while watching tv when commercials come on   Skilled Intervention Pt instructed in new ex with  cues  for proper form and safe way to  perform at home.   Patient Response/Progress Pt tolerated  well, verbalized understanding of walking  instructions   Neuromuscular Re-education   Neuromuscular re-ed of mvmt, balance, coord, kinesthetic sense, posture, proprioception minutes (85817) 4   Neuro Re-ed 1 NT: standing WBOS, NBOS, staggered, tandem on floor and on airex.   Neuro Re-ed 1 - Details 30 seec each. (NT: Then, added challenge by doing eachwith arm raises, reaching for object in air and raching for  cones on ground, or head turns.)   Neuro Re-ed 2 Walking in hallway with head turns. Cues for larger steps and  head turns on command, cues for eyes forward and upright posture, and no shuffled gait   Neuro Re-ed 2 - Details requires SBA, able to demonstrate more upright posture  today, pt struggles with not shuffling d/t having shuffled his feet his entire life   Neuro Re-ed 3 NT: walking in gym, small hurddle step overs: 2 sets  fwd  and lateral,  CGA-Min A  for  slight  LOB walking sideways, 2nd trial fwd pt " demonstrated less staggering, pt fatigued at the end of lateral walking   Neuro Re-ed 3 - Details NT: Flush  ladder:  fwd,lateral and diagonal walking x 2 times through   Skilled Intervention Pt instructed in new ex and  encouraged  to get up and move throughout the day   Patient Response/Progress pt tolerated well, SBA for amb   Physical Performance Test/measures   Physical Performance Test/Measurement, Minutes (57216) 23   Skilled Intervention instructed pt in above tests in  order to reassess pts progress  in therapy   Patient Response/Progress Patient  tolerated gait belt donned today.   Physical Performance Test/Measurement Details FGA, 30 sec STS, TUG, Rhomberg   Progress pt has made good improvements in FGA, rhomberg and TUG -  see obj measures   Education   Learner/Method Patient;Listening;Demonstration;Pictures/Video;No Barriers to Learning   Plan   Home program See PTRx   Plan for next session Continue with dual cog task balance and walking with head turns/dynamic balance focus   Comments   Comments Assessment: Patient is here for working on improving his balance s/p TIA. Pt demonstrates continued correction with ankles of  slight LOB during stationary ex, he notes he can tell this is where he feels the balance ex. Pt has made good progress  in FGA, TUG, and Rhomberg - decreasing time and improving scores in all, see obj measures. He continues to demonstrate difficulty with tandem balance and walking indicated by increased unsteadiness throughout session. Pt continues to benefit from further PT in order to return to highest level of function.   Total Session Time   Timed Code Treatment Minutes 35   Total Treatment Time (sum of timed and untimed services) 35

## 2024-04-18 ENCOUNTER — HOSPITAL ENCOUNTER (OUTPATIENT)
Dept: CARDIOLOGY | Facility: CLINIC | Age: 79
Discharge: HOME | End: 2024-04-18
Attending: INTERNAL MEDICINE
Payer: COMMERCIAL

## 2024-04-18 ENCOUNTER — HOSPITAL ENCOUNTER (OUTPATIENT)
Dept: NUCLEAR MEDICINE | Facility: CLINIC | Age: 79
Discharge: HOME | End: 2024-04-18
Attending: INTERNAL MEDICINE
Payer: COMMERCIAL

## 2024-04-18 ENCOUNTER — TELEPHONE (OUTPATIENT)
Dept: CARDIOLOGY | Facility: CLINIC | Age: 79
End: 2024-04-18

## 2024-04-18 DIAGNOSIS — I10 ESSENTIAL HYPERTENSION WITH GOAL BLOOD PRESSURE LESS THAN 140/90: ICD-10-CM

## 2024-04-18 DIAGNOSIS — I48.91 NEW ONSET ATRIAL FIBRILLATION (H): ICD-10-CM

## 2024-04-18 DIAGNOSIS — I10 ESSENTIAL HYPERTENSION: ICD-10-CM

## 2024-04-18 PROCEDURE — 343N000001 HC RX 343: Performed by: INTERNAL MEDICINE

## 2024-04-18 PROCEDURE — 93018 CV STRESS TEST I&R ONLY: CPT | Performed by: STUDENT IN AN ORGANIZED HEALTH CARE EDUCATION/TRAINING PROGRAM

## 2024-04-18 PROCEDURE — 78452 HT MUSCLE IMAGE SPECT MULT: CPT

## 2024-04-18 PROCEDURE — 78452 HT MUSCLE IMAGE SPECT MULT: CPT | Mod: 26 | Performed by: STUDENT IN AN ORGANIZED HEALTH CARE EDUCATION/TRAINING PROGRAM

## 2024-04-18 PROCEDURE — A9500 TC99M SESTAMIBI: HCPCS | Performed by: INTERNAL MEDICINE

## 2024-04-18 PROCEDURE — 250N000011 HC RX IP 250 OP 636: Mod: JZ | Performed by: INTERNAL MEDICINE

## 2024-04-18 PROCEDURE — 93016 CV STRESS TEST SUPVJ ONLY: CPT | Performed by: INTERNAL MEDICINE

## 2024-04-18 PROCEDURE — 93017 CV STRESS TEST TRACING ONLY: CPT

## 2024-04-18 RX ORDER — CAFFEINE 200 MG
200 TABLET ORAL
Status: DISCONTINUED | OUTPATIENT
Start: 2024-04-18 | End: 2024-04-18 | Stop reason: HOSPADM

## 2024-04-18 RX ORDER — AMINOPHYLLINE 25 MG/ML
50 INJECTION, SOLUTION INTRAVENOUS
Status: DISCONTINUED | OUTPATIENT
Start: 2024-04-18 | End: 2024-04-18 | Stop reason: HOSPADM

## 2024-04-18 RX ORDER — CAFFEINE CITRATE 20 MG/ML
60 SOLUTION INTRAVENOUS
Status: DISCONTINUED | OUTPATIENT
Start: 2024-04-18 | End: 2024-04-18 | Stop reason: HOSPADM

## 2024-04-18 RX ORDER — REGADENOSON 0.08 MG/ML
0.4 INJECTION, SOLUTION INTRAVENOUS ONCE
Status: COMPLETED | OUTPATIENT
Start: 2024-04-18 | End: 2024-04-18

## 2024-04-18 RX ORDER — ALBUTEROL SULFATE 0.83 MG/ML
2.5 SOLUTION RESPIRATORY (INHALATION)
Status: DISCONTINUED | OUTPATIENT
Start: 2024-04-18 | End: 2024-04-18 | Stop reason: HOSPADM

## 2024-04-18 RX ADMIN — Medication 8.02 MILLICURIE: at 09:31

## 2024-04-18 RX ADMIN — Medication 36.8 MILLICURIE: at 10:05

## 2024-04-18 RX ADMIN — REGADENOSON 0.4 MG: 0.08 INJECTION, SOLUTION INTRAVENOUS at 10:11

## 2024-04-18 NOTE — TELEPHONE ENCOUNTER
Received a voicemail with reports of BP log. Noted pt at stress test presently. Will call with results + obtain bp log at the same time. -maksim

## 2024-04-19 RX ORDER — DILTIAZEM HYDROCHLORIDE 240 MG/1
240 CAPSULE, COATED, EXTENDED RELEASE ORAL DAILY
Qty: 90 CAPSULE | Refills: 3 | Status: SHIPPED | OUTPATIENT
Start: 2024-04-19

## 2024-04-23 ENCOUNTER — THERAPY VISIT (OUTPATIENT)
Dept: PHYSICAL THERAPY | Facility: REHABILITATION | Age: 79
End: 2024-04-23
Payer: COMMERCIAL

## 2024-04-23 DIAGNOSIS — G45.9 TIA (TRANSIENT ISCHEMIC ATTACK): Primary | ICD-10-CM

## 2024-04-23 PROCEDURE — 97110 THERAPEUTIC EXERCISES: CPT | Mod: GP

## 2024-04-23 PROCEDURE — 97112 NEUROMUSCULAR REEDUCATION: CPT | Mod: GP

## 2024-07-15 PROBLEM — G45.9 TIA (TRANSIENT ISCHEMIC ATTACK): Status: RESOLVED | Noted: 2024-02-13 | Resolved: 2024-07-15

## 2024-07-15 NOTE — PROGRESS NOTES
DISCHARGE  Reason for Discharge: Patient has met all goals.  Patient chooses to discontinue therapy.    Equipment Issued: NA    Discharge Plan: Patient to continue home program.    Referring Provider:  Lucio Barnes        04/23/24 0500   Appointment Info   Signing clinician's name / credentials Swati Robertson, PT   Visits Used 10   Medical Diagnosis TIA (transient ischemic attack)  (TIA, can work on balance, thank you)   PT Tx Diagnosis Impaired functional mobility, balance, gait, and endurance   Progress Note/Certification   Start of Care Date 02/13/24   Onset of illness/injury or Date of Surgery 02/05/24   Therapy Frequency 1 time per week   Predicted Duration 10 weeks   Certification date from 04/09/24   Certification date to 07/15/24   Progress Note Due Date 06/18/24   Progress Note Completed Date 04/16/24   PT Goal 1   Goal Identifier HEP   Goal Description Koko will demonstrate mastery of ([id number]-cn need for cueing) and compliance with (completion on at least 5/7 days a week) his home exercise program to facilitate increased rate of improvement.   Goal Progress Daily   Target Date 03/12/24   Date Met 03/05/24   PT Goal 2   Goal Identifier LEFS   Goal Description Koko will demonstrate significantly improved function as evidenced by an improved LEFS score of at least 55/80.   Goal Progress 46/80 on 3/19/2024, progressing towards   Target Date 07/15/24   PT Goal 3   Goal Identifier Walking   Goal Description Koko will demonstrate improved tolerance to functional mobility as evidenced by his ability to walk up to 30 minutes with self-report back pain no more than 2/10.   Goal Progress 10 minutes no pain; 20 minutes high pain, progressing towards   Target Date 04/09/24   PT Goal 4   Goal Identifier 30 Second Sit-to-stand   Goal Description Koko will demonstrate increased lower extremity strength and improved tolerance to functional transfers as evidenced by an improved 30 second sit-to-stand  "score of at least 13/30.   Goal Progress 11 reps/standard chair on 3/19/2024, 10 reps/standard chair on 4/16/24   Target Date 04/09/24   Subjective Report   Subjective Report Pt reports he has been having some  difficulty walking. He is not able to describe exactly what feels hard about his walking.   Objective Measures   Objective Measures Objective Measure 1;Objective Measure 2;Objective Measure 3;Objective Measure 4   Objective Measure 1   Objective Measure TUG   Details 3/19/2024: 11.43 seconds (stable on turn), 9.98 seconds (stable turn) on 4/16/24   Objective Measure 2   Objective Measure Romberg   Details 3/19/2024: Eyes open: 30+ seconds (min sway). Eyes closed: 30 second (moderate sway); 4/16/2024: Eyes open: 30+ seconds (no sway-minimal sway). Eyes closed: 34 second (mod sway)   Objective Measure 3   Objective Measure 30 Second Sit-to-stand   Details 3/19/2024: 11 reps/standard chair; 10 reps/standard chair on 4/16/24   Objective Measure 4   Objective Measure FGA   Details 3/19/2024: 15/30; 4/16/24: 24/30   Therapeutic Procedure/Exercise   Therapeutic Procedures: strength, endurance, ROM, flexibility minutes (89790) 10   Ther Proc 1 Nustep   Ther Proc 1 - Details 5 minutes (level 5)   Ther Proc 2 Standing marching: x 15 reps B   Ther Proc 2 - Details Verbal review of HEP   Ther Proc 3 Reviewed Step ups: 4\" 1 set  x  10 reps B, cues for which leg to step up with   Ther Proc 3 - Details STS: x 4  reps, added to HEP and challenged pt  to  perform on various surfaces in his home   Ther Proc 4 Edu pt on importance of moving throughout the day, instructed him to get up and move while watching tv when commercials come on   Ther Proc 4 - Details Standing  rows and shoulder  ext: 15 reps each, green TB   Skilled Intervention Pt instructed in new ex with  cues  for proper form and safe way to  perform at home.   Patient Response/Progress Pt tolerated  well, verbalized understanding of walking  instructions "   Neuromuscular Re-education   Neuromuscular re-ed of mvmt, balance, coord, kinesthetic sense, posture, proprioception minutes (70993) 30   Neuro Re-ed 1 standing WBOS, NBOS, staggered, tandem on floor and on airex.   Neuro Re-ed 1 - Details 30 sec each. (NT: Then, added challenge by doing eachwith arm raises, reaching for object in air and raching for  cones on ground, or head turns.)   Neuro Re-ed 2 Walking in hallway with head turns. Cues for larger steps and  head turns on command, cues for eyes forward and upright posture, and no shuffled gait. Pt demonstrating slightly improved gait with less deviations side to side. 2 sets x ~170'   Neuro Re-ed 2 - Details requires SBA, able to demonstrate more upright posture  today, pt struggles with not shuffling d/t having shuffled his feet his entire life, pt requires min-mod verbal cueing for upright posture   Neuro Re-ed 3 walking in gym, small hurddle step overs: 2 sets  fwd  and lateral,  CGA-Min A  for  slight  LOB walking sideways, pt demonstrates improved balance with decreased LOB after 1-2 x  through hurdles   Neuro Re-ed 3 - Details NT: Flush  ladder:  fwd,lateral and diagonal walking x 2 times through   Skilled Intervention Pt instructed in new ex and  encouraged  to get up and move throughout the day   Patient Response/Progress pt tolerated well, SBA for amb   Physical Performance Test/measures   Physical Performance Test/Measurement Details FGA, 30 sec STS, TUG, Rhomberg   Skilled Intervention instructed pt in above tests in  order to reassess pts progress  in therapy   Patient Response/Progress Patient  tolerated gait belt donned today.   Progress pt has made good improvements in FGA, rhomberg and TUG -  see obj measures   Education   Learner/Method Patient;Listening;Demonstration;Pictures/Video;No Barriers to Learning   Plan   Home program See PTRx   Plan for next session Continue with dual cog task balance and walking with head turns/dynamic balance focus    Comments   Comments Assessment: Patient is here for working on improving his balance s/p TIA. Pt demonstrates continued correction with ankles of  slight LOB during stationary ex especially on airex. He continues to demonstrate difficulty with tandem balance and walking indicated by increased unsteadiness throughout session. We focused on balance  ex and gait training today. Pt demonstrates huched gait  with decreased heel  strike. He does show improved forward ambulation with  minimal lateral deviations  throughout session. Pt continues to benefit from further PT in order to return to highest level of function. Of note, pt will be out of [place] for a family function over the next month, but  plans on returning to  PT when he is back. Pts cert  is through the middle of  July.   Total Session Time   Timed Code Treatment Minutes 40   Total Treatment Time (sum of timed and untimed services) 40

## 2024-08-21 DIAGNOSIS — I10 ESSENTIAL HYPERTENSION: ICD-10-CM

## 2024-08-21 DIAGNOSIS — I71.40 ABDOMINAL AORTIC ANEURYSM (AAA) WITHOUT RUPTURE (H): ICD-10-CM

## 2024-08-21 RX ORDER — LOSARTAN POTASSIUM 100 MG/1
TABLET ORAL
Qty: 90 TABLET | Refills: 0 | Status: SHIPPED | OUTPATIENT
Start: 2024-08-21

## 2024-09-22 ENCOUNTER — HEALTH MAINTENANCE LETTER (OUTPATIENT)
Age: 79
End: 2024-09-22

## 2024-10-02 ENCOUNTER — TELEPHONE (OUTPATIENT)
Dept: NEUROLOGY | Facility: CLINIC | Age: 79
End: 2024-10-02
Payer: COMMERCIAL

## 2024-10-02 NOTE — TELEPHONE ENCOUNTER
"Called pt to schedule stroke return visit, left message to call back. Patient was seen by the stroke team about 7 months ago, at that time it was recommended patient follow up with the stroke team to check in, review plan and optimize overall stroke risk management and we would like to offer an appointment.     When patient calls back: please schedule Stroke Return with Edilma Cervantes CNP however if no availability with this provider, ok to schedule with a different stroke LEA, any visit type as per patient preference, appointment notes \"7 month follow up for TIA\".    Return in about 6 months (around 9/20/2024) for Follow up, with Edilma, (other LEA okay if not available in timeframe).    SOL ROCHA, ANA    "

## 2024-10-21 ENCOUNTER — OFFICE VISIT (OUTPATIENT)
Dept: CARDIOLOGY | Facility: CLINIC | Age: 79
End: 2024-10-21
Payer: COMMERCIAL

## 2024-10-21 VITALS
DIASTOLIC BLOOD PRESSURE: 69 MMHG | OXYGEN SATURATION: 96 % | HEART RATE: 62 BPM | BODY MASS INDEX: 33.72 KG/M2 | SYSTOLIC BLOOD PRESSURE: 137 MMHG | WEIGHT: 235 LBS

## 2024-10-21 DIAGNOSIS — E11.9 TYPE 2 DIABETES MELLITUS TREATED WITHOUT INSULIN (H): ICD-10-CM

## 2024-10-21 DIAGNOSIS — E66.09 CLASS 1 OBESITY DUE TO EXCESS CALORIES WITHOUT SERIOUS COMORBIDITY WITH BODY MASS INDEX (BMI) OF 34.0 TO 34.9 IN ADULT: ICD-10-CM

## 2024-10-21 DIAGNOSIS — I25.83 CORONARY ARTERY DISEASE DUE TO LIPID RICH PLAQUE: Primary | ICD-10-CM

## 2024-10-21 DIAGNOSIS — M05.731 RHEUMATOID ARTHRITIS INVOLVING BOTH WRISTS WITH POSITIVE RHEUMATOID FACTOR (H): ICD-10-CM

## 2024-10-21 DIAGNOSIS — E66.811 CLASS 1 OBESITY DUE TO EXCESS CALORIES WITHOUT SERIOUS COMORBIDITY WITH BODY MASS INDEX (BMI) OF 34.0 TO 34.9 IN ADULT: ICD-10-CM

## 2024-10-21 DIAGNOSIS — E78.00 PURE HYPERCHOLESTEROLEMIA: ICD-10-CM

## 2024-10-21 DIAGNOSIS — M05.732 RHEUMATOID ARTHRITIS INVOLVING BOTH WRISTS WITH POSITIVE RHEUMATOID FACTOR (H): ICD-10-CM

## 2024-10-21 DIAGNOSIS — G89.29 CHRONIC BILATERAL LOW BACK PAIN WITHOUT SCIATICA: ICD-10-CM

## 2024-10-21 DIAGNOSIS — I10 ESSENTIAL HYPERTENSION: ICD-10-CM

## 2024-10-21 DIAGNOSIS — G47.33 OSA (OBSTRUCTIVE SLEEP APNEA): ICD-10-CM

## 2024-10-21 DIAGNOSIS — I71.43 INFRARENAL ABDOMINAL AORTIC ANEURYSM (AAA) WITHOUT RUPTURE (H): ICD-10-CM

## 2024-10-21 DIAGNOSIS — M54.50 CHRONIC BILATERAL LOW BACK PAIN WITHOUT SCIATICA: ICD-10-CM

## 2024-10-21 DIAGNOSIS — I25.10 CORONARY ARTERY DISEASE DUE TO LIPID RICH PLAQUE: Primary | ICD-10-CM

## 2024-10-21 DIAGNOSIS — I48.0 PAROXYSMAL ATRIAL FIBRILLATION (H): ICD-10-CM

## 2024-10-21 PROBLEM — R79.89 TROPONIN LEVEL ELEVATED: Status: RESOLVED | Noted: 2019-02-07 | Resolved: 2024-10-21

## 2024-10-21 LAB
CHOLEST SERPL-MCNC: 100 MG/DL
FASTING STATUS PATIENT QL REPORTED: YES
HDLC SERPL-MCNC: 26 MG/DL
LDLC SERPL CALC-MCNC: 48 MG/DL
NONHDLC SERPL-MCNC: 74 MG/DL
TRIGL SERPL-MCNC: 130 MG/DL

## 2024-10-21 PROCEDURE — 80061 LIPID PANEL: CPT | Performed by: INTERNAL MEDICINE

## 2024-10-21 PROCEDURE — G2211 COMPLEX E/M VISIT ADD ON: HCPCS | Performed by: INTERNAL MEDICINE

## 2024-10-21 PROCEDURE — 36415 COLL VENOUS BLD VENIPUNCTURE: CPT | Performed by: INTERNAL MEDICINE

## 2024-10-21 PROCEDURE — 99214 OFFICE O/P EST MOD 30 MIN: CPT | Performed by: INTERNAL MEDICINE

## 2024-10-21 NOTE — LETTER
10/21/2024    Jelani Shabazz MD  [facility] [address]  [place] [address]    RE: Koko Ronquillo       Dear Colleague,     I had the pleasure of seeing Koko Ronquillo in the [facility].      [facility]  Heart Care Clinic Follow-up Note    Assessment & Plan        (I25.10,  I25.83) Coronary artery disease due to lipid rich plaque  (primary encounter diagnosis)  Comment: Due to borderline abnormal troponin he underwent angiography. January 3, 2022 with left main 10% stenosis, proximal LAD 25% followed by mid 25% followed by 90% mid lesion that received a drug-coated stent as well as a second diagonal 90% stenosis.  The circumflex had a proximal 20% lesion, mid 40% lesion, with a third obtuse marginal artery 50% stenosis and the fourth obtuse marginal artery 50% stenosis.  Right coronary artery had a proximal 10% lesion, there is a patent stent, distal right coronary artery 80% stenosis which got a drug-coated stent as well as a distal 50% lesion.  Given significant fatigue he had a pharmacological stress nuclear April of this year showing no significant ischemia or scar.  Symptoms are persisted, but no chest pain.      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  [facility] had repeat intervention, doing better.  Follow-up CAT scan August 2013 showed no significant endoluminal leak.     (I10) Hypertension  Comment: Resistant, controlled on Cardizem to 40, losartan 100 mg, metoprolol 50.  Does have a little orthostasis and of asked him to get up slowly.     (E78.00) Pure hypercholesterolemia  Comment: Total cholesterol 116 with an LDL of 55 on atorvastatin 80 which is excellent.  These however are several years old, will recheck today.     (G47.33) SHAWN (obstructive sleep apnea)  Comment: So noted, work on weight loss.  He does  not have CPAP at home, given his poor sleep hygiene as well as profound daytime somnolence strongly recommend he be evaluated by sleep doctor again.    (E66.01) Class 3 severe obesity due to excess calories without serious comorbidity in adult, unspecified BMI (H)  Comment: Work on weight loss, current BMI 33.72.     (E11.9) Type 2 diabetes mellitus treated without insulin (H)  Comment: Hemoglobin A1c 7.1 and defer to primary.  He tells me he is not diabetic anymore, is not checking his sugars anymore, defer to primary as well as endocrinology.     (I48.0) Paroxysmal atrial fibrillation (H)  Comment: Paroxysmal, possibly due to sleep apnea, no recent episodes since before 2016.  Currently given advanced CHADS 2 VASC score he is on Eliquis 5 mg by mouth twice daily.       (M54.50,  G89.29) Chronic bilateral low back pain without sciatica  Comment: Biggest issue, defer to primary.  I told him to try some Tylenol arthritis.      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 he sees a rheumatologist.    Plan  1.  Continue to work on weight loss.  2.  Check fasting lipid profile today.  3.  Speak with primary about sleep study.  4.  Speak with rheumatologist about back pain.  5.  Follow-up in a little over a year or sooner if needed.    The longitudinal plan of care for the diagnosis(es)/condition(s) as documented were addressed during this visit. Due to the added complexity in care, I will continue to support Koko in the subsequent management and with ongoing continuity of care.     Subjective  CC: 78-year-old soon-to-be 79-year-old white gentleman living independently with a female significant other.  Tells me he does not do much activity at home, basically watches TV.  Tired all the time but tells me is not sleeping well due to snoring.  Also gets up middle night due to nocturia and then goes to watch TV thereafter.   "No significant chest pains, palpitations, shortness of breath other than with excessive activity, PND, orthopnea or peripheral edema, syncope.  Does have a little orthostasis.    Medications  Current Outpatient Medications   Medication Sig Dispense Refill     albuterol (PROAIR HFA;PROVENTIL HFA;VENTOLIN HFA) 90 mcg/actuation inhaler Inhale 2 puffs into the lungs every 4 hours as needed       apixaban ANTICOAGULANT (ELIQUIS) 5 MG tablet Take 5 mg by mouth 2 times daily       atorvastatin (LIPITOR) 80 MG tablet Take 1 tablet (80 mg) by mouth daily 90 tablet 3     diltiazem ER COATED BEADS (CARDIZEM CD/CARTIA XT) 240 MG 24 hr capsule Take 1 capsule (240 mg) by mouth daily 90 capsule 3     folic acid (FOLVITE) 1 MG tablet Take 1 mg by mouth daily Do not take on methotrexate days (Tuesday)  4     ketoconazole (NIZORAL) 2 % external shampoo Apply topically twice a week       losartan (COZAAR) 100 MG tablet TAKE 1 TABLET(100 MG) BY MOUTH DAILY 90 tablet 0     methotrexate 2.5 MG tablet Take 10 mg by mouth once a week 4 tabs once weekly on Tuesdays       metoprolol succinate ER (TOPROL XL) 50 MG 24 hr tablet Take 50 mg by mouth daily       tamsulosin (FLOMAX) 0.4 mg cap [TAMSULOSIN (FLOMAX) 0.4 MG CAP] Take 1 capsule (0.4 mg total) by mouth 2 (two) times a day. 180 capsule 0     nitroGLYcerin (NITROSTAT) 0.4 MG sublingual tablet One tablet under the tongue every 5 minutes if needed for chest pain. May repeat every 5 minutes for a maximum of 3 doses in 15 minutes\" 25 tablet 3       Objective  /69 (BP Location: Right arm, Patient Position: Sitting, Cuff Size: Adult Regular)   Pulse 62   Wt 106.6 kg (235 lb)   SpO2 96%   BMI 33.72 kg/m      General Appearance:    Alert, cooperative, no distress, appears stated age   Head:    Normocephalic, without obvious abnormality, atraumatic   Throat:   Lips, mucosa, and tongue normal; teeth and gums normal   Neck:   Supple, symmetrical, trachea midline, no adenopathy;        " "thyroid:  No enlargement/tenderness/nodules; no carotid    bruit or JVD   Back:     Symmetric, no curvature, ROM normal, no CVA tenderness   Lungs:     Clear to auscultation bilaterally, respirations unlabored   Chest wall:    No tenderness or deformity   Heart:    Regular rate and rhythm, S1 and S2 normal, no murmur, rub   or gallop   Abdomen:     Soft, non-tender, bowel sounds active all four quadrants,     no masses, no organomegaly   Extremities:   Normal, atraumatic, no cyanosis or edema   Pulses:   2+ and symmetric all extremities   Skin:   Skin color, texture, turgor normal, no rashes or lesions     Results    Lab Results personally reviewed   Lab Results   Component Value Date    CHOL 116 02/05/2024    CHOL 120 02/21/2022     Lab Results   Component Value Date    HDL 30 (L) 02/05/2024    HDL 27 (L) 02/21/2022     No components found for: \"LDLCALC\"  Lab Results   Component Value Date    TRIG 156 (H) 02/05/2024    TRIG 331 (H) 02/21/2022     Lab Results   Component Value Date    WBC 6.9 02/06/2024    HGB 12.9 (L) 02/06/2024    HCT 38.0 (L) 02/06/2024     02/06/2024     Lab Results   Component Value Date    BUN 11.6 02/06/2024     02/06/2024    CO2 23 02/06/2024               Thank you for allowing me to participate in the care of your patient.      Sincerely,     PARVIZ ARGUETA MD     [facility]  cc:   Erica Argueta MD  [address], [address]  [place] [address]      "

## 2024-10-21 NOTE — PATIENT INSTRUCTIONS
Mr Koko Ronquillo,  I enjoyed visiting with you again today.  I am glad to hear you are doing well from a heart stand point.  Per our conversation I will check lipids today.  Keep working on weight and I would say treat the sleep apnea.  I will plan on seeing you 1 year.  Ajay Argueta

## 2024-10-21 NOTE — PROGRESS NOTES
[facility]  Heart Care Clinic Follow-up Note    Assessment & Plan        (I25.10,  I25.83) Coronary artery disease due to lipid rich plaque  (primary encounter diagnosis)  Comment: Due to borderline abnormal troponin he underwent angiography. January 3, 2022 with left main 10% stenosis, proximal LAD 25% followed by mid 25% followed by 90% mid lesion that received a drug-coated stent as well as a second diagonal 90% stenosis.  The circumflex had a proximal 20% lesion, mid 40% lesion, with a third obtuse marginal artery 50% stenosis and the fourth obtuse marginal artery 50% stenosis.  Right coronary artery had a proximal 10% lesion, there is a patent stent, distal right coronary artery 80% stenosis which got a drug-coated stent as well as a distal 50% lesion.  Given significant fatigue he had a pharmacological stress nuclear April of this year showing no significant ischemia or scar.  Symptoms are persisted, but no chest pain.      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  [facility] had repeat intervention, doing better.  Follow-up CAT scan August 2013 showed no significant endoluminal leak.     (I10) Hypertension  Comment: Resistant, controlled on Cardizem to 40, losartan 100 mg, metoprolol 50.  Does have a little orthostasis and of asked him to get up slowly.     (E78.00) Pure hypercholesterolemia  Comment: Total cholesterol 116 with an LDL of 55 on atorvastatin 80 which is excellent.  These however are several years old, will recheck today.     (G47.33) SHAWN (obstructive sleep apnea)  Comment: So noted, work on weight loss.  He does not have CPAP at home, given his poor sleep hygiene as well as profound daytime somnolence strongly recommend he be evaluated by sleep doctor again.    (E66.01) Class 3 severe obesity due to excess calories without serious comorbidity in  adult, unspecified BMI (H)  Comment: Work on weight loss, current BMI 33.72.     (E11.9) Type 2 diabetes mellitus treated without insulin (H)  Comment: Hemoglobin A1c 7.1 and defer to primary.  He tells me he is not diabetic anymore, is not checking his sugars anymore, defer to primary as well as endocrinology.     (I48.0) Paroxysmal atrial fibrillation (H)  Comment: Paroxysmal, possibly due to sleep apnea, no recent episodes since before 2016.  Currently given advanced CHADS 2 VASC score he is on Eliquis 5 mg by mouth twice daily.       (M54.50,  G89.29) Chronic bilateral low back pain without sciatica  Comment: Biggest issue, defer to primary.  I told him to try some Tylenol arthritis.      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 he sees a rheumatologist.    Plan  1.  Continue to work on weight loss.  2.  Check fasting lipid profile today.  3.  Speak with primary about sleep study.  4.  Speak with rheumatologist about back pain.  5.  Follow-up in a little over a year or sooner if needed.    The longitudinal plan of care for the diagnosis(es)/condition(s) as documented were addressed during this visit. Due to the added complexity in care, I will continue to support Koko in the subsequent management and with ongoing continuity of care.     Subjective  CC: 78-year-old soon-to-be 79-year-old white gentleman living independently with a female significant other.  Tells me he does not do much activity at home, basically watches TV.  Tired all the time but tells me is not sleeping well due to snoring.  Also gets up middle night due to nocturia and then goes to watch TV thereafter.  No significant chest pains, palpitations, shortness of breath other than with excessive activity, PND, orthopnea or peripheral edema, syncope.  Does have a little orthostasis.    Medications  Current Outpatient Medications   Medication Sig  "Dispense Refill    albuterol (PROAIR HFA;PROVENTIL HFA;VENTOLIN HFA) 90 mcg/actuation inhaler Inhale 2 puffs into the lungs every 4 hours as needed      apixaban ANTICOAGULANT (ELIQUIS) 5 MG tablet Take 5 mg by mouth 2 times daily      atorvastatin (LIPITOR) 80 MG tablet Take 1 tablet (80 mg) by mouth daily 90 tablet 3    diltiazem ER COATED BEADS (CARDIZEM CD/CARTIA XT) 240 MG 24 hr capsule Take 1 capsule (240 mg) by mouth daily 90 capsule 3    folic acid (FOLVITE) 1 MG tablet Take 1 mg by mouth daily Do not take on methotrexate days (Tuesday)  4    ketoconazole (NIZORAL) 2 % external shampoo Apply topically twice a week      losartan (COZAAR) 100 MG tablet TAKE 1 TABLET(100 MG) BY MOUTH DAILY 90 tablet 0    methotrexate 2.5 MG tablet Take 10 mg by mouth once a week 4 tabs once weekly on Tuesdays      metoprolol succinate ER (TOPROL XL) 50 MG 24 hr tablet Take 50 mg by mouth daily      tamsulosin (FLOMAX) 0.4 mg cap [TAMSULOSIN (FLOMAX) 0.4 MG CAP] Take 1 capsule (0.4 mg total) by mouth 2 (two) times a day. 180 capsule 0    nitroGLYcerin (NITROSTAT) 0.4 MG sublingual tablet One tablet under the tongue every 5 minutes if needed for chest pain. May repeat every 5 minutes for a maximum of 3 doses in 15 minutes\" 25 tablet 3       Objective  /69 (BP Location: Right arm, Patient Position: Sitting, Cuff Size: Adult Regular)   Pulse 62   Wt 106.6 kg (235 lb)   SpO2 96%   BMI 33.72 kg/m      General Appearance:    Alert, cooperative, no distress, appears stated age   Head:    Normocephalic, without obvious abnormality, atraumatic   Throat:   Lips, mucosa, and tongue normal; teeth and gums normal   Neck:   Supple, symmetrical, trachea midline, no adenopathy;        thyroid:  No enlargement/tenderness/nodules; no carotid    bruit or JVD   Back:     Symmetric, no curvature, ROM normal, no CVA tenderness   Lungs:     Clear to auscultation bilaterally, respirations unlabored   Chest wall:    No tenderness or deformity " "  Heart:    Regular rate and rhythm, S1 and S2 normal, no murmur, rub   or gallop   Abdomen:     Soft, non-tender, bowel sounds active all four quadrants,     no masses, no organomegaly   Extremities:   Normal, atraumatic, no cyanosis or edema   Pulses:   2+ and symmetric all extremities   Skin:   Skin color, texture, turgor normal, no rashes or lesions     Results    Lab Results personally reviewed   Lab Results   Component Value Date    CHOL 116 02/05/2024    CHOL 120 02/21/2022     Lab Results   Component Value Date    HDL 30 (L) 02/05/2024    HDL 27 (L) 02/21/2022     No components found for: \"LDLCALC\"  Lab Results   Component Value Date    TRIG 156 (H) 02/05/2024    TRIG 331 (H) 02/21/2022     Lab Results   Component Value Date    WBC 6.9 02/06/2024    HGB 12.9 (L) 02/06/2024    HCT 38.0 (L) 02/06/2024     02/06/2024     Lab Results   Component Value Date    BUN 11.6 02/06/2024     02/06/2024    CO2 23 02/06/2024           "

## 2024-10-21 NOTE — LETTER
October 21, 2024    Koko Ronquillo  [address] [address]    Dear ,    We are writing to inform you of your test results.  Nice to see you again, your cholesterol looks great, keep up the good work.  Of course the good cholesterol or HDL is low, nothing for the most part we can do anything about other than work on what we are doing.    Resulted Orders   Lipid panel reflex to direct LDL Fasting   Result Value Ref Range    Cholesterol 100 <200 mg/dL    Triglycerides 130 <150 mg/dL    Direct Measure HDL 26 (L) >=40 mg/dL    LDL Cholesterol Calculated 48 <100 mg/dL    Non HDL Cholesterol 74 <130 mg/dL    Patient Fasting > 8hrs? Yes     Narrative    Cholesterol  Desirable: < 200 mg/dL    Triglycerides  Normal: < 150 mg/dL  Borderline High: 150 - 199 mg/dL    Direct Measure HDL  Male: >= 40 mg/dL    LDL Cholesterol  Desirable: < 100 mg/dL    Non HDL Cholesterol  Desirable: < 130 mg/dL  Above Desirable: 130 - 159 mg/dL       If you have any questions or concerns, please call the clinic at the number listed above.       Sincerely,    Erica Argueta MD

## 2024-11-23 NOTE — PROGRESS NOTES
__________________________________________________________      [facility] Nalini Taylor   [phone]  __________________________________________________________           History of Present Illness   Chief Complaint: Patient presents with:  RECHECK: A couple of dizzy spells - couple minutes long - had maybe two of them, and more fatigued, some vision issues      Koko Ronquillo is a 78 year old male presenting for follow-up for TIA .     He was hospitalized at [facility] on 2/5/24. Prior to the hospital stay, he had a past medical history of atrial fibrillation not on anticoagulation (not on AC per last cardiology note 2/2023 due to no known recent afib, some report of intermittent palpitations), HTN, HLD, CAD, SHAWN, DM2, RA, complex endovascular aortic aneurysm repair.     He presented to the hospital with difficulty speaking and reading words on his computer. Symptoms last about 1 hour before resolving. He was recommended to start Eliquis for stroke prevention in the setting of atrial fibrillation, but wanted to consider it further. In the meantime, he was discharged on DAPT with ASA + Plavix.     Interval Events:   He was last evaluated in stroke clinic on 3/20/24. At that time, he was concerned about starting Eliquis due to cost and concern for interactions with his methotrexate. Per pharmacy note 3/18: No increased risk of bleeding with apixiban and methotrexate. No concerns with aspirin and methotrexate, provided aspirin is the preventative dosage and methotrexate is at lower dosages used for rheumatological disorders, not chemotherapeutic dosages.      Since this time, he has started Eliquis and denies bleeding concerns. The price varies, but is not cost prohibitive. He reports that he frequently feels fatigued, but has not yet followed up with sleep medicine. He has had a couple dizzy spells over the past eight months. They occurred when he got up out of chair and  "lasted about 20 seconds before resolving. Occasionally, he has episodes where one of his eyes feels like it \"goes crossed\". He sometimes sees \"broken glass \" that moves across his vision. He can still see through the visual disturbance. No associated headache. No pain in the eye. Sometimes there are weeks where it doesn't occur and other times it happens every day for a week. Lasts about 20 minutes. No particular time of day. Have been happening for years.     Modified [place] Scale  Score: 0-No symptoms    Stroke Evaluation Summarized:  New results resulted and reviewed by me today are in BOLD below.  I personally reviewed the following neuroimaging studies today and the comments above reflect my own personal interpretation of the images: images: MRI brain    MRI and/or Head CT MRI: 1.  No discrete mass lesion, hemorrhage or focal area suggestive of acute ischemia.  2.  No abnormal enhancement.  3.  Diffuse age related changes  CTP: Normal cerebral perfusion.    CT head: 1.  No intracranial hemorrhage, mass lesions, hydrocephalus or CT evidence for an acute infarct. MRI is more sensitive for the evaluation of acute infarcts.  2.  Mild diffuse cerebral parenchymal volume loss. Presumed chronic hypertensive/microvascular ischemic white matter changes.   Intracranial Vasculature MRA brain: 1.  No discrete vessel occlusion, significant stenosis, aneurysm or high flow vascular malformation involving the arteries of the [place].     CTA head:  1.  No hemodynamically significant narrowing of the proximal major intracranial arteries. 2 mm infundibulum at the right middle cerebral artery trifurcation.  2.  Mild atherosclerotic plaque of the cavernous and supraclinoid internal carotid arteries. Mild to moderate atherosclerotic plaque of the intracranial left vertebral artery.    Cervical Vasculature MRA neck: 1.  Normal configuration of the great vessels off the aortic arch with no significant stenosis of their " "origins.  2.  No significant stenosis or irregularity involving the arteries of the neck by NASCET criteria.  3.  No radiographic evidence of dissection.     CTA neck:  1.  Mild plaque at both proximal internal carotid arteries. No hemodynamically significant narrowing of the internal carotid arteries bilaterally based on the NASCET criteria.  2.  Mild narrowing of the origin of the left vertebral artery. Multifocal mild narrowings of the proximal V2 segment of the left vertebral artery. Mild narrowing of the distal V2 and V3 segment of the right vertebral artery.     Echocardiogram EF 55-60%, mild concentric LVH, negative bubble    EKG/Telemetry Sinus bradycardia    Other Testing Not Applicable      Labs Lab Results   Component Value Date    LDL 48 10/21/2024    A1C 6.2 (H) 02/05/2024    CTROPT 15 02/05/2024    INR 1.08 02/05/2024    INR 0.95 01/03/2022                 Home Medications     Current Outpatient Medications   Medication Sig Dispense Refill    apixaban ANTICOAGULANT (ELIQUIS) 5 MG tablet Take 5 mg by mouth 2 times daily      atorvastatin (LIPITOR) 80 MG tablet Take 1 tablet (80 mg) by mouth daily 90 tablet 3    folic acid (FOLVITE) 1 MG tablet Take 1 mg by mouth daily Do not take on methotrexate days (Tuesday)  4    ketoconazole (NIZORAL) 2 % external shampoo Apply topically twice a week      losartan (COZAAR) 100 MG tablet TAKE 1 TABLET(100 MG) BY MOUTH DAILY 90 tablet 0    methotrexate 2.5 MG tablet Take 10 mg by mouth once a week 4 tabs once weekly on Tuesdays      metoprolol succinate ER (TOPROL XL) 50 MG 24 hr tablet Take 50 mg by mouth daily      nitroGLYcerin (NITROSTAT) 0.4 MG sublingual tablet One tablet under the tongue every 5 minutes if needed for chest pain. May repeat every 5 minutes for a maximum of 3 doses in 15 minutes\" 25 tablet 3    tamsulosin (FLOMAX) 0.4 mg cap [TAMSULOSIN (FLOMAX) 0.4 MG CAP] Take 1 capsule (0.4 mg total) by mouth 2 (two) times a day. 180 capsule 0    albuterol " (PROAIR HFA;PROVENTIL HFA;VENTOLIN HFA) 90 mcg/actuation inhaler Inhale 2 puffs into the lungs every 4 hours as needed (Patient not taking: Reported on 2024)      diltiazem ER COATED BEADS (CARDIZEM CD/CARTIA XT) 240 MG 24 hr capsule Take 1 capsule (240 mg) by mouth daily 90 capsule 3     No current facility-administered medications for this visit.            Physical Examination     Vitals:            BMI Readings from Last 1 Encounters:   10/21/24 33.72 kg/m        Neurologic: Completed via telemedicine video call  Mental Status:  alert, oriented x 3, speech clear and fluent  Cranial Nerves:  EOMI with normal smooth pursuit, facial movements symmetric, hearing not formally tested but intact to conversation, no dysarthria, shoulder shrug equal bilaterally  Motor:  no abnormal movements, able to move all limbs antigravity spontaneously with no signs of hemiparesis observed  Coordination:  formal testing deferred, no obvious ataxia         Screenings and Questionnaires:     Tobacco:    Tobacco Use      Smoking status: Former        Packs/day: 0.00        Types: Cigarettes        Quit date: 2007        Years since quittin.7        Passive exposure: Never      Smokeless tobacco: Never      Sleep Apnea:       Depression:      2024     1:42 PM   PHQ-2 (  [employer])   Q1: Little interest or pleasure in doing things 0   Q2: Feeling down, depressed or hopeless 0   PHQ-2 Score 0       Stroke Recovery and Risk Factors:       No data to display                    Assessment and Plan       1. Episode of expressive aphasia consistent with TIA in the setting of atrial fibrillation, not on anticoagulation. No on DOAC.     - Continue Eliquis 5 mg daily. If this needs to be stopped for any reason, please notify stroke clinic for guidance on how to proceed.    - Currently on Lipitor 80 mg daily, continue with goal LDL 40-70. Recent LDL 48.  - Goal A1c <7.0  - Goal BP <140/90 with tighter control associated with  "decreased overall CV risk, if tolerated. Discussed the importance of home BP monitoring and keeping a log for PCP.  - SHAWN study ordered while inpatient, he has not scheduled this yet. Provided him with phone number to schedule.      2. Atrial fibrillation  - Anticoagulation, as above     3. Episodic visual changes, described as \"looking through broken glass\". Frequency and stereotyped nature would not be consistent with TIA. Consideration for acephalgic migraine vs primary ophthalmologic etiology  - Discussed referral to general neurology, he declines at this time  - Follow up with established eye clinic    - Return in about 1 year (around 11/26/2025) for Follow up, with Edilma, using a video visit, (30 minute visit).    Stroke Education provided.  He will call us with any questions.  For any acute neurologic deficits he was advised to  go directly to the hospital rather than call the clinic.      Edilma Cervantes, [facility]  Neurology  11/26/2024     ____________________________________________________________________    Billing:  Please note: for coding purposes this visit should be billed only as established and not new, since this patient was seen by my same subspecialty team ([facility]) during the hospitalization that this visit is a follow up for.  I spent a total of 30 minutes on the day of the visit.   Time spent by me today doing chart review, history and exam, documentation and further activities per the note    "

## 2024-11-26 ENCOUNTER — VIRTUAL VISIT (OUTPATIENT)
Dept: NEUROLOGY | Facility: CLINIC | Age: 79
End: 2024-11-26
Payer: COMMERCIAL

## 2024-11-26 DIAGNOSIS — G45.9 TIA (TRANSIENT ISCHEMIC ATTACK): Primary | ICD-10-CM

## 2024-11-26 PROCEDURE — 99214 OFFICE O/P EST MOD 30 MIN: CPT | Mod: 95 | Performed by: NURSE PRACTITIONER

## 2024-11-26 NOTE — NURSING NOTE
Koko is a 78 year old who is being evaluated via a billable video visit.    How would you like to obtain your AVS? MyChart  If the video visit is dropped, the invitation should be resent by: MyC  Will anyone else be joining your video visit? No    Video-Visit Details    Type of service:  Video Visit   Video End Time:  Originating Location (pt. Location): Home    Distant Location (provider location):  On-site  Platform used for Video Visit: Nabila

## 2024-11-26 NOTE — LETTER
11/26/2024      Koko Ronquillo  [address] [address]      Dear Colleague,    Thank you for referring your patient, Koko Ronquillo, to the [facility]. Please see a copy of my visit note below.        __________________________________________________________      [facility]   [phone]  __________________________________________________________           History of Present Illness   Chief Complaint: Patient presents with:  RECHECK: A couple of dizzy spells - couple minutes long - had maybe two of them, and more fatigued, some vision issues      Koko Ronquillo is a 78 year old male presenting for follow-up for TIA .     He was hospitalized at [facility] on 2/5/24. Prior to the hospital stay, he had a past medical history of atrial fibrillation not on anticoagulation (not on AC per last cardiology note 2/2023 due to no known recent afib, some report of intermittent palpitations), HTN, HLD, CAD, SHAWN, DM2, RA, complex endovascular aortic aneurysm repair.     He presented to the hospital with difficulty speaking and reading words on his computer. Symptoms last about 1 hour before resolving. He was recommended to start Eliquis for stroke prevention in the setting of atrial fibrillation, but wanted to consider it further. In the meantime, he was discharged on DAPT with ASA + Plavix.     Interval Events:   He was last evaluated in stroke clinic on 3/20/24. At that time, he was concerned about starting Eliquis due to cost and concern for interactions with his methotrexate. Per pharmacy note 3/18: No increased risk of bleeding with apixiban and methotrexate. No concerns with aspirin and methotrexate, provided aspirin is the preventative dosage and methotrexate is at lower dosages used for rheumatological disorders, not chemotherapeutic dosages.      Since this time, he has started Eliquis and denies bleeding concerns.  "The price varies, but is not cost prohibitive. He reports that he frequently feels fatigued, but has not yet followed up with sleep medicine. He has had a couple dizzy spells over the past eight months. They occurred when he got up out of chair and lasted about 20 seconds before resolving. Occasionally, he has episodes where one of his eyes feels like it \"goes crossed\". He sometimes sees \"broken glass \" that moves across his vision. He can still see through the visual disturbance. No associated headache. No pain in the eye. Sometimes there are weeks where it doesn't occur and other times it happens every day for a week. Lasts about 20 minutes. No particular time of day. Have been happening for years.     Modified [place] Scale  Score: 0-No symptoms    Stroke Evaluation Summarized:  New results resulted and reviewed by me today are in BOLD below.  I personally reviewed the following neuroimaging studies today and the comments above reflect my own personal interpretation of the images: images: MRI brain    MRI and/or Head CT MRI: 1.  No discrete mass lesion, hemorrhage or focal area suggestive of acute ischemia.  2.  No abnormal enhancement.  3.  Diffuse age related changes  CTP: Normal cerebral perfusion.    CT head: 1.  No intracranial hemorrhage, mass lesions, hydrocephalus or CT evidence for an acute infarct. MRI is more sensitive for the evaluation of acute infarcts.  2.  Mild diffuse cerebral parenchymal volume loss. Presumed chronic hypertensive/microvascular ischemic white matter changes.   Intracranial Vasculature MRA brain: 1.  No discrete vessel occlusion, significant stenosis, aneurysm or high flow vascular malformation involving the arteries of the [place] of [place].     CTA head:  1.  No hemodynamically significant narrowing of the proximal major intracranial arteries. 2 mm infundibulum at the right middle cerebral artery trifurcation.  2.  Mild atherosclerotic plaque of the cavernous and supraclinoid " internal carotid arteries. Mild to moderate atherosclerotic plaque of the intracranial left vertebral artery.    Cervical Vasculature MRA neck: 1.  Normal configuration of the great vessels off the aortic arch with no significant stenosis of their origins.  2.  No significant stenosis or irregularity involving the arteries of the neck by NASCET criteria.  3.  No radiographic evidence of dissection.     CTA neck:  1.  Mild plaque at both proximal internal carotid arteries. No hemodynamically significant narrowing of the internal carotid arteries bilaterally based on the NASCET criteria.  2.  Mild narrowing of the origin of the left vertebral artery. Multifocal mild narrowings of the proximal V2 segment of the left vertebral artery. Mild narrowing of the distal V2 and V3 segment of the right vertebral artery.     Echocardiogram EF 55-60%, mild concentric LVH, negative bubble    EKG/Telemetry Sinus bradycardia    Other Testing Not Applicable      Labs Lab Results   Component Value Date    LDL 48 10/21/2024    A1C 6.2 (H) 02/05/2024    CTROPT 15 02/05/2024    INR 1.08 02/05/2024    INR 0.95 01/03/2022                 Home Medications     Current Outpatient Medications   Medication Sig Dispense Refill     apixaban ANTICOAGULANT (ELIQUIS) 5 MG tablet Take 5 mg by mouth 2 times daily       atorvastatin (LIPITOR) 80 MG tablet Take 1 tablet (80 mg) by mouth daily 90 tablet 3     folic acid (FOLVITE) 1 MG tablet Take 1 mg by mouth daily Do not take on methotrexate days (Tuesday)  4     ketoconazole (NIZORAL) 2 % external shampoo Apply topically twice a week       losartan (COZAAR) 100 MG tablet TAKE 1 TABLET(100 MG) BY MOUTH DAILY 90 tablet 0     methotrexate 2.5 MG tablet Take 10 mg by mouth once a week 4 tabs once weekly on Tuesdays       metoprolol succinate ER (TOPROL XL) 50 MG 24 hr tablet Take 50 mg by mouth daily       nitroGLYcerin (NITROSTAT) 0.4 MG sublingual tablet One tablet under the tongue every 5 minutes if  "needed for chest pain. May repeat every 5 minutes for a maximum of 3 doses in 15 minutes\" 25 tablet 3     tamsulosin (FLOMAX) 0.4 mg cap [TAMSULOSIN (FLOMAX) 0.4 MG CAP] Take 1 capsule (0.4 mg total) by mouth 2 (two) times a day. 180 capsule 0     albuterol (PROAIR HFA;PROVENTIL HFA;VENTOLIN HFA) 90 mcg/actuation inhaler Inhale 2 puffs into the lungs every 4 hours as needed (Patient not taking: Reported on 2024)       diltiazem ER COATED BEADS (CARDIZEM CD/CARTIA XT) 240 MG 24 hr capsule Take 1 capsule (240 mg) by mouth daily 90 capsule 3     No current facility-administered medications for this visit.            Physical Examination     Vitals:            BMI Readings from Last 1 Encounters:   10/21/24 33.72 kg/m        Neurologic: Completed via telemedicine video call  Mental Status:  alert, oriented x 3, speech clear and fluent  Cranial Nerves:  EOMI with normal smooth pursuit, facial movements symmetric, hearing not formally tested but intact to conversation, no dysarthria, shoulder shrug equal bilaterally  Motor:  no abnormal movements, able to move all limbs antigravity spontaneously with no signs of hemiparesis observed  Coordination:  formal testing deferred, no obvious ataxia         Screenings and Questionnaires:     Tobacco:    Tobacco Use      Smoking status: Former        Packs/day: 0.00        Types: Cigarettes        Quit date: 2007        Years since quittin.7        Passive exposure: Never      Smokeless tobacco: Never      Sleep Apnea:       Depression:      2024     1:42 PM   PHQ-2 (  [employer])   Q1: Little interest or pleasure in doing things 0   Q2: Feeling down, depressed or hopeless 0   PHQ-2 Score 0       Stroke Recovery and Risk Factors:       No data to display                    Assessment and Plan       1. Episode of expressive aphasia consistent with TIA in the setting of atrial fibrillation, not on anticoagulation. No on DOAC.     - Continue Eliquis 5 mg daily. " "If this needs to be stopped for any reason, please notify stroke clinic for guidance on how to proceed.    - Currently on Lipitor 80 mg daily, continue with goal LDL 40-70. Recent LDL 48.  - Goal A1c <7.0  - Goal BP <140/90 with tighter control associated with decreased overall CV risk, if tolerated. Discussed the importance of home BP monitoring and keeping a log for PCP.  - SHAWN study ordered while inpatient, he has not scheduled this yet. Provided him with phone number to schedule.      2. Atrial fibrillation  - Anticoagulation, as above     3. Episodic visual changes, described as \"looking through broken glass\". Frequency and stereotyped nature would not be consistent with TIA. Consideration for acephalgic migraine vs primary ophthalmologic etiology  - Discussed referral to general neurology, he declines at this time  - Follow up with established eye clinic    - Return in about 1 year (around 11/26/2025) for Follow up, with Edilma, using a video visit, (30 minute visit).    Stroke Education provided.  He will call us with any questions.  For any acute neurologic deficits he was advised to  go directly to the hospital rather than call the clinic.      Edilma Cervantes CNP  Neurology  11/26/2024     ____________________________________________________________________    Billing:  Please note: for coding purposes this visit should be billed only as established and not new, since this patient was seen by my same subspecialty team ([facility]) during the hospitalization that this visit is a follow up for.  I spent a total of 30 minutes on the day of the visit.   Time spent by me today doing chart review, history and exam, documentation and further activities per the note      Again, thank you for allowing me to participate in the care of your patient.        Sincerely,        Edilma Cervantes CNP  "

## 2025-02-19 DIAGNOSIS — I71.40 ABDOMINAL AORTIC ANEURYSM (AAA) WITHOUT RUPTURE: ICD-10-CM

## 2025-02-19 DIAGNOSIS — I10 ESSENTIAL HYPERTENSION: ICD-10-CM

## 2025-02-19 RX ORDER — LOSARTAN POTASSIUM 100 MG/1
TABLET ORAL
Qty: 90 TABLET | Refills: 1 | Status: SHIPPED | OUTPATIENT
Start: 2025-02-19

## 2025-04-05 ENCOUNTER — HEALTH MAINTENANCE LETTER (OUTPATIENT)
Age: 80
End: 2025-04-05

## 2025-06-09 ENCOUNTER — OFFICE VISIT (OUTPATIENT)
Dept: CARDIOLOGY | Facility: CLINIC | Age: 80
End: 2025-06-09
Payer: COMMERCIAL

## 2025-06-09 VITALS
RESPIRATION RATE: 16 BRPM | BODY MASS INDEX: 36.29 KG/M2 | WEIGHT: 245 LBS | DIASTOLIC BLOOD PRESSURE: 58 MMHG | HEIGHT: 69 IN | HEART RATE: 67 BPM | SYSTOLIC BLOOD PRESSURE: 130 MMHG

## 2025-06-09 DIAGNOSIS — E66.811 CLASS 1 OBESITY DUE TO EXCESS CALORIES WITHOUT SERIOUS COMORBIDITY WITH BODY MASS INDEX (BMI) OF 34.0 TO 34.9 IN ADULT: ICD-10-CM

## 2025-06-09 DIAGNOSIS — E78.00 PURE HYPERCHOLESTEROLEMIA: ICD-10-CM

## 2025-06-09 DIAGNOSIS — E11.9 TYPE 2 DIABETES MELLITUS TREATED WITHOUT INSULIN (H): ICD-10-CM

## 2025-06-09 DIAGNOSIS — I25.83 CORONARY ARTERY DISEASE DUE TO LIPID RICH PLAQUE: Primary | ICD-10-CM

## 2025-06-09 DIAGNOSIS — G47.33 OSA (OBSTRUCTIVE SLEEP APNEA): ICD-10-CM

## 2025-06-09 DIAGNOSIS — I25.10 CORONARY ARTERY DISEASE DUE TO LIPID RICH PLAQUE: Primary | ICD-10-CM

## 2025-06-09 DIAGNOSIS — I48.0 PAROXYSMAL ATRIAL FIBRILLATION (H): ICD-10-CM

## 2025-06-09 DIAGNOSIS — I50.31 ACUTE DIASTOLIC CONGESTIVE HEART FAILURE (H): ICD-10-CM

## 2025-06-09 DIAGNOSIS — E66.09 CLASS 1 OBESITY DUE TO EXCESS CALORIES WITHOUT SERIOUS COMORBIDITY WITH BODY MASS INDEX (BMI) OF 34.0 TO 34.9 IN ADULT: ICD-10-CM

## 2025-06-09 DIAGNOSIS — I10 ESSENTIAL HYPERTENSION: ICD-10-CM

## 2025-06-09 LAB — NT-PROBNP SERPL-MCNC: 107 PG/ML (ref 0–852)

## 2025-06-09 PROCEDURE — 83880 ASSAY OF NATRIURETIC PEPTIDE: CPT | Performed by: INTERNAL MEDICINE

## 2025-06-09 PROCEDURE — 36415 COLL VENOUS BLD VENIPUNCTURE: CPT | Performed by: INTERNAL MEDICINE

## 2025-06-09 NOTE — PATIENT INSTRUCTIONS
Mr Koko Ronquillo,  I enjoyed visiting with you again today.  I am sorry to hear of the shortness of breath.  Let us get the stress test and heart monitor and the blood test today.  I will plan on seeing you 3-4 months.  Ajay Argueta

## 2025-06-09 NOTE — PROGRESS NOTES
[facility]  Heart Care Clinic Follow-up Note    Assessment & Plan           (I25.10,  I25.83) Coronary artery disease due to lipid rich plaque  (primary encounter diagnosis)  Comment: Due to borderline abnormal troponin he underwent angiography. January 3, 2022 with left main 10% stenosis, proximal LAD 25% followed by mid 25% followed by 90% mid lesion that received a drug-coated stent as well as a second diagonal 90% stenosis.  The circumflex had a proximal 20% lesion, mid 40% lesion, with a third obtuse marginal artery 50% stenosis and the fourth obtuse marginal artery 50% stenosis.  Right coronary artery had a proximal 10% lesion, there is a patent stent, distal right coronary artery 80% stenosis which got a drug-coated stent as well as a distal 50% lesion.  Given severe calcification seen on CT scan as well as worsening shortness of breath concerned about worsening ischemia and will arrange for pharmacological stress nuclear as he cannot walk.  If this shows significant issues we will then proceed with invasive angiography.      (I48.0) Paroxysmal atrial fibrillation (H)  Comment: Paroxysmal, possibly due to sleep apnea, no recent episodes since before 2016.  Currently given advanced CHADS 2 VASC score he is on Eliquis 5 mg by mouth twice daily.  Given worsening shortness of breath we will make sure he is not having any arrhythmias and obtain 1 week Zio patch monitor.  If he has A-fib we will refer on to EP for ablation.    (I50.31) Acute diastolic congestive heart failure (H)  Comment: Shortness of breath is more associated with activity, no PND or orthopnea.  Doubt heart failure but will check BNP and start diuretic if need be especially in the face of 22 pound weight gain.    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  [facility] had repeat intervention, doing  better.       (I10) Hypertension  Comment: Resistant, controlled on Cardizem 240, losartan 100 mg, metoprolol 50.      (E78.00) Pure hypercholesterolemia  Comment: Total cholesterol 100 with an LDL of 48 which is excellent on atorvastatin.    (G47.33) SHAWN (obstructive sleep apnea)  Comment: So noted, work on weight loss.  He does not have CPAP at home, given his poor sleep hygiene as well as profound daytime somnolence strongly recommend he be evaluated by sleep doctor again.     (E66.01) Class 3 severe obesity due to excess calories without serious comorbidity in adult, unspecified BMI (H)  Comment: Work on weight loss, current BMI 35.92, 22 pound weight gain in the last 6 months or so.     (E11.9) Type 2 diabetes mellitus treated without insulin (H)  Comment: Hemoglobin A1c 7.1 and defer to primary.  He tells me he is not diabetic anymore, is not checking his sugars anymore, defer to primary as well as endocrinology.       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     Plan  1.  Check BNP looking for heart failure and if elevated start diuretic.  2.  Pharmacological stress nuclear given inability to walk, if abnormal proceed with repeat angiography.  3.  Zio patch monitor looking for recurrent atrial fibrillation and address.  4.  Continue to work on weight loss.  5.  Follow-up with me in several months given all these issues or sooner if needed.    The longitudinal plan of care for the diagnosis(es)/condition(s) as documented were addressed during this visit. Due to the added complexity in care, I will continue to support Koko in the subsequent management and with ongoing continuity of care.     Subjective  CC: 79-year-old white gentleman being seen in urgent follow-up.  He tells me over the last several months she has had increased shortness of breath on activity, can barely walk  "across a parking lot without having shortness of breath.  He tells me this is due to gaining about 20 pounds since he cannot exercise due to back discomfort which is worsened.  He does not have PND, orthopnea, peripheral edema, chest pains, or major palpitations, syncope or dizziness.    Medications  Current Outpatient Medications   Medication Sig Dispense Refill    apixaban ANTICOAGULANT (ELIQUIS) 5 MG tablet Take 5 mg by mouth 2 times daily      atorvastatin (LIPITOR) 80 MG tablet Take 1 tablet (80 mg) by mouth daily 90 tablet 3    diltiazem ER COATED BEADS (CARDIZEM CD/CARTIA XT) 240 MG 24 hr capsule Take 1 capsule (240 mg) by mouth daily. 90 capsule 3    folic acid (FOLVITE) 1 MG tablet Take 1 mg by mouth daily Do not take on methotrexate days (Tuesday)  4    ketoconazole (NIZORAL) 2 % external shampoo Apply topically twice a week      losartan (COZAAR) 100 MG tablet TAKE 1 TABLET(100 MG) BY MOUTH DAILY 90 tablet 1    methotrexate 2.5 MG tablet Take 10 mg by mouth once a week 4 tabs once weekly on Tuesdays      metoprolol succinate ER (TOPROL XL) 50 MG 24 hr tablet Take 50 mg by mouth daily      tamsulosin (FLOMAX) 0.4 mg cap [TAMSULOSIN (FLOMAX) 0.4 MG CAP] Take 1 capsule (0.4 mg total) by mouth 2 (two) times a day. 180 capsule 0    albuterol (PROAIR HFA;PROVENTIL HFA;VENTOLIN HFA) 90 mcg/actuation inhaler Inhale 2 puffs into the lungs every 4 hours as needed (Patient not taking: Reported on 6/9/2025)      nitroGLYcerin (NITROSTAT) 0.4 MG sublingual tablet One tablet under the tongue every 5 minutes if needed for chest pain. May repeat every 5 minutes for a maximum of 3 doses in 15 minutes\" (Patient not taking: Reported on 6/9/2025) 25 tablet 3       Objective  /58 (BP Location: Right arm, Patient Position: Sitting, Cuff Size: Adult Large)   Pulse 67   Resp 16   Ht 1.759 m (5' 9.25\")   Wt 111.1 kg (245 lb)   BMI 35.92 kg/m      General Appearance:    Alert, cooperative, no distress, appears stated " "age   Head:    Normocephalic, without obvious abnormality, atraumatic   Throat:   Lips, mucosa, and tongue normal; teeth and gums normal   Neck:   Supple, symmetrical, trachea midline, no adenopathy;        thyroid:  No enlargement/tenderness/nodules; no carotid    bruit or JVD   Back:     Symmetric, no curvature, ROM normal, no CVA tenderness   Lungs:     Clear to auscultation bilaterally, respirations unlabored   Chest wall:    No tenderness or deformity   Heart:    Regular rate and rhythm, S1 and S2 normal, no murmur, rub   or gallop   Abdomen:     Soft, non-tender, bowel sounds active all four quadrants,     no masses, no organomegaly   Extremities:   Normal, atraumatic, no cyanosis or edema   Pulses:   2+ and symmetric all extremities   Skin:   Skin color, texture, turgor normal, no rashes or lesions     Results    Lab Results personally reviewed   Lab Results   Component Value Date    CHOL 100 10/21/2024    CHOL 116 02/05/2024     Lab Results   Component Value Date    HDL 26 (L) 10/21/2024    HDL 30 (L) 02/05/2024     No components found for: \"LDLCALC\"  Lab Results   Component Value Date    TRIG 130 10/21/2024    TRIG 156 (H) 02/05/2024     Lab Results   Component Value Date    WBC 6.9 02/06/2024    HGB 12.9 (L) 02/06/2024    HCT 38.0 (L) 02/06/2024     02/06/2024     Lab Results   Component Value Date    BUN 11.6 02/06/2024     02/06/2024    CO2 23 02/06/2024           "

## 2025-06-11 ENCOUNTER — RESULTS FOLLOW-UP (OUTPATIENT)
Dept: CARDIOLOGY | Facility: CLINIC | Age: 80
End: 2025-06-11
Payer: COMMERCIAL

## 2025-06-17 ENCOUNTER — HOSPITAL ENCOUNTER (OUTPATIENT)
Dept: CARDIOLOGY | Facility: CLINIC | Age: 80
Discharge: HOME | End: 2025-06-17
Attending: INTERNAL MEDICINE
Payer: COMMERCIAL

## 2025-06-17 ENCOUNTER — HOSPITAL ENCOUNTER (OUTPATIENT)
Dept: NUCLEAR MEDICINE | Facility: CLINIC | Age: 80
Discharge: HOME | End: 2025-06-17
Attending: INTERNAL MEDICINE
Payer: COMMERCIAL

## 2025-06-17 DIAGNOSIS — I25.83 CORONARY ARTERY DISEASE DUE TO LIPID RICH PLAQUE: ICD-10-CM

## 2025-06-17 DIAGNOSIS — I25.10 CORONARY ARTERY DISEASE DUE TO LIPID RICH PLAQUE: ICD-10-CM

## 2025-06-17 LAB
CV STRESS MAX HR HE: 79
NUC STRESS EJECTION FRACTION: 59 %
RATE PRESSURE PRODUCT: NORMAL
STRESS ECHO BASELINE DIASTOLIC HE: 92
STRESS ECHO BASELINE HR: 67 BPM
STRESS ECHO BASELINE SYSTOLIC BP: 198
STRESS ECHO CALCULATED PERCENT HR: 56 %
STRESS ECHO LAST STRESS DIASTOLIC BP: 86
STRESS ECHO LAST STRESS SYSTOLIC BP: 198
STRESS ECHO TARGET HR: 140

## 2025-06-17 PROCEDURE — 250N000011 HC RX IP 250 OP 636: Mod: JZ | Performed by: INTERNAL MEDICINE

## 2025-06-17 PROCEDURE — 93018 CV STRESS TEST I&R ONLY: CPT | Performed by: INTERNAL MEDICINE

## 2025-06-17 PROCEDURE — A9500 TC99M SESTAMIBI: HCPCS | Performed by: INTERNAL MEDICINE

## 2025-06-17 PROCEDURE — 78452 HT MUSCLE IMAGE SPECT MULT: CPT

## 2025-06-17 PROCEDURE — 343N000001 HC RX 343 MED OP 636: Performed by: INTERNAL MEDICINE

## 2025-06-17 PROCEDURE — 93016 CV STRESS TEST SUPVJ ONLY: CPT | Performed by: STUDENT IN AN ORGANIZED HEALTH CARE EDUCATION/TRAINING PROGRAM

## 2025-06-17 PROCEDURE — 93017 CV STRESS TEST TRACING ONLY: CPT

## 2025-06-17 PROCEDURE — 78452 HT MUSCLE IMAGE SPECT MULT: CPT | Mod: 26 | Performed by: INTERNAL MEDICINE

## 2025-06-17 RX ORDER — AMINOPHYLLINE 25 MG/ML
50-100 INJECTION, SOLUTION INTRAVENOUS
Status: DISCONTINUED | OUTPATIENT
Start: 2025-06-17 | End: 2025-06-18 | Stop reason: HOSPADM

## 2025-06-17 RX ORDER — REGADENOSON 0.08 MG/ML
0.4 INJECTION, SOLUTION INTRAVENOUS ONCE
Status: COMPLETED | OUTPATIENT
Start: 2025-06-17 | End: 2025-06-17

## 2025-06-17 RX ADMIN — REGADENOSON 0.4 MG: 0.08 INJECTION, SOLUTION INTRAVENOUS at 09:52

## 2025-06-17 RX ADMIN — TECHNETIUM TC 99M SESTAMIBI 8.3 MILLICURIE: 1 INJECTION INTRAVENOUS at 08:55

## 2025-06-17 RX ADMIN — TECHNETIUM TC 99M SESTAMIBI 34.7 MILLICURIE: 1 INJECTION INTRAVENOUS at 09:52

## 2025-06-23 ENCOUNTER — TELEPHONE (OUTPATIENT)
Dept: CARDIOLOGY | Facility: CLINIC | Age: 80
End: 2025-06-23
Payer: COMMERCIAL

## 2025-06-23 NOTE — TELEPHONE ENCOUNTER
[facility] Call Center    Phone Message    May a detailed message be left on voicemail: yes     Reason for Call: Other: Patient stated they received ziopatch monitor in the mail last Friday, but will like to know if they should put it on or wait since they are going on a motorcycle trip tomorrow and will not be back until 07/05. Please call patient back before the end of the day to further discuss.       Action Taken: Other: Cardiology    Travel Screening: Not Applicable     Thank you!  [facility]

## 2025-06-23 NOTE — TELEPHONE ENCOUNTER
"Per Dr. Argueta's 6-9-25 visit note:  \"Paroxysmal atrial fibrillation (H)  Comment: Paroxysmal, possibly due to sleep apnea, no recent episodes since before 2016.  Currently given advanced CHADS 2 VASC score he is on Eliquis 5 mg by mouth twice daily.  Given worsening shortness of breath we will make sure he is not having any arrhythmias and obtain 1 week Zio patch monitor.  If he has A-fib we will refer on to EP for ablation.\"    Return call to patient - reviewed reason for wearing monitor and confirmed he is taking Eliquis as prescribed - discussed pros / cons of postponing wearing Zio until he returns from trip - patient verbalized understanding of discussion and agreed to contact [employer] to inform rep of plans to wear monitor upon his return.  mg  "

## 2025-07-14 ENCOUNTER — TELEPHONE (OUTPATIENT)
Dept: CARDIOLOGY | Facility: CLINIC | Age: 80
End: 2025-07-14
Payer: COMMERCIAL

## 2025-07-14 DIAGNOSIS — I48.0 PAROXYSMAL ATRIAL FIBRILLATION (H): Primary | ICD-10-CM

## 2025-07-14 DIAGNOSIS — G45.9 TIA (TRANSIENT ISCHEMIC ATTACK): ICD-10-CM

## 2025-07-14 NOTE — TELEPHONE ENCOUNTER
----- Message from Erica OLIVERA sent at 7/14/2025  8:33 AM CDT -----  Regarding: Zio Patch  Hi,     This email is to notify you that the patient had a fall off [1 DAYS] into the wear time and we were unable to resolve the issue with troubleshooting.     We asked the patient to return the device.?We recommend you wait for us to analyze the data captured on the first device before you decide to replace the patient's monitor. We may have captured critical information that will be useful in caring for your patient.     If you would like us to send the patient a new device, please submit a new home enrollment order through your EHR. If you have any questions, please respond to this email, or call [facility] at [phone] and reference number [id number].     Patient initials: ERYN  MRN: [id number]  Serial Number: [id number]  Account: [facility]  Prescriber: SEKOU CARRERO     For more information about other unresolved or pending items, please log in to [employer]io [employer] or refer to your Zio Action Report, which is sent every Monday and Thursday via email.      Thank you,  Linn    [facility]

## 2025-07-14 NOTE — TELEPHONE ENCOUNTER
Spoke with pt who shares that he didn't put the patch on correctly so it fell off after about 1 day. Discussed that I would send another patch to him and advised that he read the instructions and/or call customer service as they can talk him through application over the phone. Pt understanding. Address confirmed and new order placed. Pt had no other needs at this time. aa

## 2025-08-02 LAB — CV ZIO PRELIM RESULTS: NORMAL

## (undated) DEVICE — CATH CORONARY LITHOTRIPSY SHOCKWAVE 3.0X12MM C2IVL3012

## (undated) DEVICE — CATH GUIDELINER 6FR 5571

## (undated) DEVICE — KIT HAND CONTROL ACIST 014644 AR-P54

## (undated) DEVICE — CATH BALLOON NC EMERGE 4.00X8MM H7493926708400

## (undated) DEVICE — GUIDEWIRE VASCULAR 0.035IN DIA 145CML 15CML/6CML STAINLESS

## (undated) DEVICE — SLEEVE TR BAND RADIAL COMPRESSION DEVICE 29CM XX-RF06L

## (undated) DEVICE — STENT CORONARY DES SYNERGY XD MR US 3.00X12MM H7493941812300: Type: IMPLANTABLE DEVICE | Status: NON-FUNCTIONAL

## (undated) DEVICE — CATH DIAG 4FR ANG PIG 538453S

## (undated) DEVICE — SHEATH GLIDE RADIAL 4FR 25CM 0.021

## (undated) DEVICE — CATH LAUNCHER 6FR AL 1.0 LA6AL10

## (undated) DEVICE — MANIFOLD KIT ANGIO AUTOMATED 014613

## (undated) DEVICE — CATH BALLOON EMERGE 3.0X12MM H7493918912300

## (undated) DEVICE — STENT CORONARY DES SYNERGY XD MR US 4.00X12MM H7493941812400: Type: IMPLANTABLE DEVICE | Status: NON-FUNCTIONAL

## (undated) DEVICE — CATH DIAG 4FR JR 5.0 538423

## (undated) DEVICE — GUIDEWIRE FORTE FLOPPY J TOP 34949-05J

## (undated) DEVICE — ELECTRODE ADULT PACING MULTI P-211-M1

## (undated) DEVICE — SHEATH GLIDE RADIAL 6FR 25CM .035

## (undated) DEVICE — CATH BALLOON NC EMERGE 3.50X12MM H7493926712350

## (undated) DEVICE — CUSTOM PACK CORONARY SAN5BCRHEA

## (undated) DEVICE — CATH ANGIO INFINITI JL3.5 4FRX100CM 538418

## (undated) DEVICE — CATH LAUNCHER 6FR EBU 3.5 LA6EBU35

## (undated) DEVICE — EXCHANGE WIRE .035 260 STAR/JFC/035/260/ M001491681

## (undated) DEVICE — INTRO MICRO MINI STICK 4FR STD NITINOL

## (undated) DEVICE — SYR ANGIOGRAPHY MULTIUSE KIT ACIST 014612

## (undated) DEVICE — DEVICE INFLATION W/KIT & 6IN TUBING

## (undated) RX ORDER — FENTANYL CITRATE 50 UG/ML
INJECTION, SOLUTION INTRAMUSCULAR; INTRAVENOUS
Status: DISPENSED
Start: 2022-01-03

## (undated) RX ORDER — CLOPIDOGREL 300 MG/1
TABLET, FILM COATED ORAL
Status: DISPENSED
Start: 2022-01-03

## (undated) RX ORDER — ASPIRIN 325 MG
TABLET ORAL
Status: DISPENSED
Start: 2022-01-03

## (undated) RX ORDER — DIAZEPAM 5 MG
TABLET ORAL
Status: DISPENSED
Start: 2022-01-03